# Patient Record
Sex: FEMALE | Race: WHITE | NOT HISPANIC OR LATINO | Employment: FULL TIME | URBAN - METROPOLITAN AREA
[De-identification: names, ages, dates, MRNs, and addresses within clinical notes are randomized per-mention and may not be internally consistent; named-entity substitution may affect disease eponyms.]

---

## 2017-08-19 ENCOUNTER — ALLSCRIPTS OFFICE VISIT (OUTPATIENT)
Dept: OTHER | Facility: OTHER | Age: 52
End: 2017-08-19

## 2017-08-21 ENCOUNTER — GENERIC CONVERSION - ENCOUNTER (OUTPATIENT)
Dept: OTHER | Facility: OTHER | Age: 52
End: 2017-08-21

## 2017-08-24 LAB
A/G RATIO (HISTORICAL): 1.5 (ref 1.2–2.2)
ALBUMIN SERPL BCP-MCNC: 4.1 G/DL (ref 3.5–5.5)
ALP SERPL-CCNC: 58 IU/L (ref 39–117)
ALT SERPL W P-5'-P-CCNC: 12 IU/L (ref 0–32)
ANTINUCLEAR ANTIBODIES, IFA (HISTORICAL): NEGATIVE
AST SERPL W P-5'-P-CCNC: 12 IU/L (ref 0–40)
BACTERIA UR QL AUTO: ABNORMAL
BASOPHILS # BLD AUTO: 0 %
BASOPHILS # BLD AUTO: 0 X10E3/UL (ref 0–0.2)
BILIRUB SERPL-MCNC: 0.2 MG/DL (ref 0–1.2)
BILIRUB UR QL STRIP: NEGATIVE
BUN SERPL-MCNC: 9 MG/DL (ref 6–24)
BUN/CREA RATIO (HISTORICAL): 12 (ref 9–23)
CALCIUM SERPL-MCNC: 9.2 MG/DL (ref 8.7–10.2)
CHLORIDE SERPL-SCNC: 101 MMOL/L (ref 96–106)
CHOLEST SERPL-MCNC: 253 MG/DL (ref 100–199)
CHOLEST/HDLC SERPL: 4.9 RATIO UNITS (ref 0–4.4)
CO2 SERPL-SCNC: 19 MMOL/L (ref 18–29)
COLOR UR: YELLOW
COMMENT (HISTORICAL): ABNORMAL
CREAT SERPL-MCNC: 0.74 MG/DL (ref 0.57–1)
DEPRECATED RDW RBC AUTO: 17 % (ref 12.3–15.4)
EGFR AFRICAN AMERICAN (HISTORICAL): 108 ML/MIN/1.73
EGFR-AMERICAN CALC (HISTORICAL): 94 ML/MIN/1.73
EOSINOPHIL # BLD AUTO: 0.2 X10E3/UL (ref 0–0.4)
EOSINOPHIL # BLD AUTO: 3 %
FECAL OCCULT BLOOD DIAGNOSTIC (HISTORICAL): NEGATIVE
GLUCOSE (HISTORICAL): NEGATIVE
GLUCOSE SERPL-MCNC: 104 MG/DL (ref 65–99)
HBA1C MFR BLD HPLC: 5.7 % (ref 4.8–5.6)
HCT VFR BLD AUTO: 31.3 % (ref 34–46.6)
HDLC SERPL-MCNC: 52 MG/DL
HGB BLD-MCNC: 9.6 G/DL (ref 11.1–15.9)
IMM.GRANULOCYTES (CD4/8) (HISTORICAL): 0 %
IMM.GRANULOCYTES (CD4/8) (HISTORICAL): 0 X10E3/UL (ref 0–0.1)
KETONES UR STRIP-MCNC: NEGATIVE MG/DL
LDL CHOLESTEROL DIRECT (HISTORICAL): 167 MG/DL (ref 0–99)
LDLC SERPL CALC-MCNC: 154 MG/DL (ref 0–99)
LEUKOCYTE ESTERASE UR QL STRIP: NEGATIVE
LYME IGG/IGM AB (HISTORICAL): <0.91 ISR (ref 0–0.9)
LYMPHOCYTES # BLD AUTO: 1.9 X10E3/UL (ref 0.7–3.1)
LYMPHOCYTES # BLD AUTO: 26 %
MCH RBC QN AUTO: 23 PG (ref 26.6–33)
MCHC RBC AUTO-ENTMCNC: 30.7 G/DL (ref 31.5–35.7)
MCV RBC AUTO: 75 FL (ref 79–97)
MICROSCOPIC EXAMINATION (HISTORICAL): ABNORMAL
MICROSCOPIC EXAMINATION (HISTORICAL): ABNORMAL
MONOCYTES # BLD AUTO: 0.6 X10E3/UL (ref 0.1–0.9)
MONOCYTES (HISTORICAL): 8 %
MUCUS THREADS (HISTORICAL): PRESENT
NEUTROPHILS # BLD AUTO: 4.7 X10E3/UL (ref 1.4–7)
NEUTROPHILS # BLD AUTO: 63 %
NITRITE UR QL STRIP: NEGATIVE
NON-SQ EPI CELLS URNS QL MICRO: >10 /HPF
PH UR STRIP.AUTO: 5.5 [PH] (ref 5–7.5)
PLATELET # BLD AUTO: 386 X10E3/UL (ref 150–379)
POTASSIUM SERPL-SCNC: 4.6 MMOL/L (ref 3.5–5.2)
PROT UR STRIP-MCNC: NEGATIVE MG/DL
RBC (HISTORICAL): 4.17 X10E6/UL (ref 3.77–5.28)
RBC (HISTORICAL): ABNORMAL /HPF
SODIUM SERPL-SCNC: 138 MMOL/L (ref 134–144)
SP GR UR STRIP.AUTO: 1.02 (ref 1–1.03)
T4 FREE SERPL-MCNC: 0.94 NG/DL (ref 0.82–1.77)
TOT. GLOBULIN, SERUM (HISTORICAL): 2.8 G/DL (ref 1.5–4.5)
TOTAL PROTEIN (HISTORICAL): 6.9 G/DL (ref 6–8.5)
TRIGL SERPL-MCNC: 236 MG/DL (ref 0–149)
TSH SERPL DL<=0.05 MIU/L-ACNC: 4.75 UIU/ML (ref 0.45–4.5)
URINALYSIS (UA) (HISTORICAL): ABNORMAL
UROBILINOGEN UR QL STRIP.AUTO: 0.2 EU/DL (ref 0.2–1)
VLDLC SERPL CALC-MCNC: 47 MG/DL (ref 5–40)
WBC # BLD AUTO: 7.4 X10E3/UL (ref 3.4–10.8)
WBC # BLD AUTO: ABNORMAL /HPF

## 2017-08-25 ENCOUNTER — GENERIC CONVERSION - ENCOUNTER (OUTPATIENT)
Dept: OTHER | Facility: OTHER | Age: 52
End: 2017-08-25

## 2017-09-19 ENCOUNTER — ALLSCRIPTS OFFICE VISIT (OUTPATIENT)
Dept: OTHER | Facility: OTHER | Age: 52
End: 2017-09-19

## 2017-09-19 DIAGNOSIS — Z12.31 ENCOUNTER FOR SCREENING MAMMOGRAM FOR MALIGNANT NEOPLASM OF BREAST: ICD-10-CM

## 2017-10-24 ENCOUNTER — ALLSCRIPTS OFFICE VISIT (OUTPATIENT)
Dept: OTHER | Facility: OTHER | Age: 52
End: 2017-10-24

## 2017-10-24 DIAGNOSIS — M25.50 PAIN IN JOINT: ICD-10-CM

## 2017-10-24 DIAGNOSIS — M25.561 PAIN IN RIGHT KNEE: ICD-10-CM

## 2017-10-24 DIAGNOSIS — M79.643 PAIN OF HAND: ICD-10-CM

## 2017-10-24 DIAGNOSIS — M25.562 PAIN IN LEFT KNEE: ICD-10-CM

## 2017-10-25 NOTE — PROGRESS NOTES
Assessment  1  Depression with anxiety (300 4) (F41 8)   2  Polyarthralgia (719 49) (M25 50)   3  Knee pain, bilateral (719 46) (M25 561,M25 562)   4  Chronic hand pain, unspecified laterality (369 2,266 93) (M79 643,G89 29)    Plan  Chronic hand pain, unspecified laterality    · * XR HAND 3+ VIEW LEFT; Status:Active; Requested VAA:43VMP7898;    · * XR HAND 3+ VIEW RIGHT; Status:Active; Requested QSQ:19FOA9400;   Chronic hand pain, unspecified laterality, Knee pain, bilateral, Polyarthralgia    · Celecoxib 200 MG Oral Capsule; TAKE 1 CAPSULE DAILY WITH A MEAL   · (1) AMADOU SCREEN W/REFLEX TO TITER/PATTERN; Status:Active; Requested  AEU:[id number];    · (1) LYME ANTIBODY, WESTERN BLOT; Status:Active; Requested BWK:77UAV5257;    · (1) RHEUMATOID FACTOR SCREEN; Status:Active; Requested NWO:36OXZ3459;    · (1) SED RATE; Status:Active; Requested WWX:32QEG0154;    · (1) T4, FREE; Status:Active; Requested GGU:[id number];    · (1) TSH; Status:Active; Requested GTY:56NBV8153;    · (1) VITAMIN D 25-HYDROXY; Status:Active; Requested QUV:47BKD9118;    · (Q) CYCLIC CITRULLINATED PEPTIDE (CCP) AB (IGG); Status:Active; Requested  AMH:24HVF1754;    · Follow-up visit in 3 months Evaluation and Treatment  Follow-up  Status: Complete   Done: 24Oct2017  Iron deficiency anemia    · (1) CBC/ PLT (NO DIFF); Status:Active; Requested MNN:75KVB1887;    · (1) FERRITIN; Status:Active; Requested OZB:55ZFO5244;   Knee pain, bilateral    · * XR KNEE 3 VW LEFT NON INJURY; Status:Active; Requested IBR:[id number];    · * XR KNEE 3 VW RIGHT NON INJURY; Status:Active; Requested VIB:82ABE3982;     Discussion/Summary    1  Depression with anxiety-the patient will continue on the current dose of the low-dose as citalopram  It is helping with her symptoms  Will monitor closely  Polyarthralgia-the patient will go for the lab work to evaluate for possible underlying causes  We will follow up closely with the results  Bilateral knee pain/chronic hand pain bilateral-patient will go for the x-rays as ordered  Will follow up very closely with the results  We will see her back as scheduled  Chief Complaint  Checkup for Depression      History of Present Illness  Lola Brown is a 78-year-old female presents today for follow-up of her depression  At her last visit in September, we titrated her off the Duloxetine in changed her to escitalopram 10 mg daily  She is currently taking the escitalopram 10 mg daily  She feels the medication is not helping her at all  Her body pain returned with stopping the duloxetine  She is craving sugar more and she is not exercising as much  She is not seeing a therapist  She feels that she would not be interested in a nutritionist  She is doing well with the escitalopram for the anxiety and depression  has pain in all of her joints- elbows, shoulder, knees, hips etc  There is no muscle tightness or spasms  She feels pain in all of her bones  There are no fevers or chills  The patient is being seen for a routine clinic follow-up of anxiety disorder  The patient is currently asymptomatic  No associated symptoms are reported  The patient states her depression has improved since the last visit  She describes this as mild  She has had no significant interval events  Interval Symptoms: The patient is currently asymptomatic  denies depression,-- denies depressed mood,-- denies loss of interest or pleasure in activities,-- denies insomnia,-- denies excessive sleepiness,-- denies inability to perform normal activities,-- denies loss of energy,-- denies feelings of worthlessness,-- denies feelings of guilt,-- denies trouble concentrating,-- denies anxiety-- and-- denies sexual dysfunction     Associated symptoms include: no recent weight gain,-- no recent weight loss,-- no thoughts of suicide,-- no social difficulties,-- no delusions,-- no racing thoughts,-- no employment difficulties,-- no auditory hallucinations,-- no periods of excess energy,-- no financial difficulties,-- no visual hallucinations,-- no periods of euphoria-- and-- no difficulty with activities of daily living--   No associated symptoms are reported  Review of Systems    Constitutional: as noted in HPI  Eyes: as noted in HPI    ENT: as noted in HPI  Cardiovascular: as noted in HPI  Respiratory: as noted in HPI  Gastrointestinal: as noted in HPI  Genitourinary: as noted in HPI  Musculoskeletal: as noted in HPI  Integumentary: as noted in HPI  Active Problems  1  Allergic rhinitis (477 9) (J30 9)   2  Alopecia areata (704 01) (L63 9)   3  Colon cancer screening (V76 51) (Z12 11)   4  Constipation (564 00) (K59 00)   5  Depression with anxiety (300 4) (F41 8)   6  Encounter for screening mammogram for breast cancer (V76 12) (Z12 31)   7  Esophagitis, reflux (530 11) (K21 0)   8  Fatigue (780 79) (R53 83)   9  Hyperlipidemia (272 4) (E78 5)   10  Iron deficiency anemia (280 9) (D50 9)   11  Neoplasm of skin (239 2) (D49 2)   12  Polyarthralgia (719 49) (M25 50)   13  Polydipsia (783 5) (R63 1)   14  Polyuria (788 42) (R35 8)    Past Medical History  1  Acute bronchitis (466 0) (J20 9)   2  History of herpes zoster (V12 09) (Z86 19)    Family History  Father    1  Family history of diabetes mellitus (V18 0) (Z83 3)  Sister    2  Family history of diabetes mellitus (V18 0) (Z83 3)   3  Family history of Thyroid Disorder (V18 19)  Brother    4  Family history of autoimmune disorder (V19 8) (Z83 2)   5  Family history of diabetes mellitus (V18 0) (Z83 3)   6  Family history of Thyroid Disorder (V18 19)    The family history was reviewed and updated today  Social History   · Never A Smoker   · No alcohol use   · No illicit drug use  The social history was reviewed and updated today  The social history was reviewed and is unchanged  Current Meds   1  Claritin 10 MG Oral Tablet; TAKE 1 TABLET DAILY; Therapy: 91UVI9489 to (Evaluate:17Mar2018) Recorded   2  Escitalopram Oxalate 10 MG Oral Tablet; Take 1 tablet daily; Therapy: 01Rxc0811 to (Evaluate:18Mar2018)  Requested for: 28Jju6991; Last   Rx:65Kui0207 Ordered   3  Ferrous Sulfate 325 (65 Fe) MG Oral Tablet; TAKE 1 TABLET TWICE DAILY; Therapy: 90Bgi9749 to (Evaluate:22Sdn3365)  Requested for: 40Ynp9597; Last   Rx:06Som7227 Ordered    Allergies  1  No Known Drug Allergies    Vitals  Vital Signs    Recorded: 62DMF5229 06:24PM   Heart Rate 84, R Radial   Pulse Quality Regular, R Radial   Systolic 516, RUE, Sitting   Diastolic 90, RUE, Sitting   Height 5 ft 4 in   Weight 229 lb    BMI Calculated 39 31   BSA Calculated 2 07     Physical Exam    Constitutional   General appearance: No acute distress, well appearing and well nourished  Eyes   Conjunctiva and lids: No swelling, erythema or discharge  Pupils and irises: Equal, round and reactive to light  Ears, Nose, Mouth, and Throat   External inspection of ears and nose: Normal     Otoscopic examination: Tympanic membranes translucent with normal light reflex  Canals patent without erythema  Nasal mucosa, septum, and turbinates: Normal without edema or erythema  Oropharynx: Normal with no erythema, edema, exudate or lesions  Pulmonary   Respiratory effort: No increased work of breathing or signs of respiratory distress  Auscultation of lungs: Clear to auscultation  Cardiovascular   Palpation of heart: Normal PMI, no thrills  Auscultation of heart: Normal rate and rhythm, normal S1 and S2, without murmurs  Examination of extremities for edema and/or varicosities: Normal     Carotid pulses: Normal     Abdomen   Abdomen: Non-tender, no masses  Liver and spleen: No hepatomegaly or splenomegaly  Lymphatic   Palpation of lymph nodes in neck: No lymphadenopathy  Musculoskeletal   Gait and station: Normal     Digits and nails: Normal without clubbing or cyanosis      Inspection/palpation of joints, bones, and muscles: Normal     Skin Skin and subcutaneous tissue: Normal without rashes or lesions  Neurologic   Cranial nerves: Cranial nerves 2-12 intact  Reflexes: 2+ and symmetric  Sensation: No sensory loss      Psychiatric   Orientation to person, place, and time: Normal     Mood and affect: Normal          Signatures   Electronically signed by : Sri De Oliveira DO; Oct 25 2017 12:40AM EST                       (Author)

## 2017-10-31 ENCOUNTER — GENERIC CONVERSION - ENCOUNTER (OUTPATIENT)
Dept: OTHER | Facility: OTHER | Age: 52
End: 2017-10-31

## 2017-11-01 LAB
25(OH)D3 SERPL-MCNC: 20 NG/ML (ref 30–100)
ANTINUCLEAR ANTIBODIES, IFA (HISTORICAL): NEGATIVE
CYCLIC CITRULLINATED PEPTIDE ANTIBODY (HISTORICAL): 8 UNITS (ref 0–19)
DEPRECATED RDW RBC AUTO: 20.6 % (ref 12.3–15.4)
ERYTHROCYTE SEDIMENTATION RATE (HISTORICAL): 60 MM/HR (ref 0–40)
FERRITIN SERPL-MCNC: 17 NG/ML (ref 15–150)
HCT VFR BLD AUTO: 36.7 % (ref 34–46.6)
HGB BLD-MCNC: 12.1 G/DL (ref 11.1–15.9)
LYME IGG/IGM AB (HISTORICAL): <0.91 ISR (ref 0–0.9)
MCH RBC QN AUTO: 27.7 PG (ref 26.6–33)
MCHC RBC AUTO-ENTMCNC: 33 G/DL (ref 31.5–35.7)
MCV RBC AUTO: 84 FL (ref 79–97)
PLATELET # BLD AUTO: 347 X10E3/UL (ref 150–379)
RA LATEX TURBID (HISTORICAL): <10 IU/ML (ref 0–13.9)
RBC (HISTORICAL): 4.37 X10E6/UL (ref 3.77–5.28)
T4 FREE SERPL-MCNC: 0.83 NG/DL (ref 0.82–1.77)
TSH SERPL DL<=0.05 MIU/L-ACNC: 3.06 UIU/ML (ref 0.45–4.5)
WBC # BLD AUTO: 6.9 X10E3/UL (ref 3.4–10.8)

## 2017-11-16 ENCOUNTER — GENERIC CONVERSION - ENCOUNTER (OUTPATIENT)
Dept: OTHER | Facility: OTHER | Age: 52
End: 2017-11-16

## 2018-01-11 NOTE — MISCELLANEOUS
Message  I spoke with the patient reviewed the results of her testing  Her lab work demonstrates that she has subclinical hypothyroidism and no treatment is needed at this time  In addition she has marked iron deficiency anemia  We will start her on the iron supplement is ordered and we are going to refer her to GI for further evaluation and probable colonoscopy and EGD  She will schedule this in the very near future  We will see her back as scheduled  Plan  Iron deficiency anemia    · Ferrous Sulfate 325 (65 Fe) MG Oral Tablet; TAKE 1 TABLET TWICE DAILY   · 3 - [facility]  *  Status: Active  Requested for:  54Okp4656  are Referring to a non- Preferred Provider : Quality  Care Summary provided  : Yes    Results/Data     (1) COMPREHENSIVE METABOLIC PANEL   Alkaline Phosphatase, S: 58 IU/L Reference Range   (LC) Antinuclear Antibodies, IFA   Antinuclear Antibodies, IFA: Negative   (1) COMPREHENSIVE METABOLIC PANEL   Glucose, Serum: 104 mg/dL Abnormal High Reference Range 65-99  BUN: 9 mg/dL Reference Range 6-24  Creatinine, Serum: 0 74 mg/dL Reference Range 0 57-1 00  BUN/Creatinine Ratio: 12  Reference Range 9-23  Sodium, Serum: 138 mmol/L Reference Range 134-144  Potassium, Serum: 4 6 mmol/L Reference Range 3 5-5 2  Chloride, Serum: 101 mmol/L Reference Range   Carbon Dioxide, Total: 19 mmol/L Reference Range 18-29  eGFR If NonAfricn Am: 94 mL/min/1 73 Reference Range >59  eGFR If Africn Am: 108 mL/min/1 73 Reference Range >59  Bilirubin, Total: 0 2 mg/dL Reference Range 0 0-1 2  Calcium, Serum: 9 2 mg/dL Reference Range 8 7-10 2  (1) CBC/PLT/DIFF   WBC: 7 4 x10E3/uL Reference Range 3 4-10 8  RBC: 4 17 x10E6/uL Reference Range 3 77-5 28  Hemoglobin: 9 6 g/dL Abnormal Low Reference Range 11 1-15 9  Hematocrit: 31 3 % Abnormal Low Reference Range 34 0-46  6  MCV: 75 fL Abnormal Low Reference Range 79-97  MCH: 23 0 pg Abnormal Low Reference Range 26 6-33 0  MCHC: 30 7 g/dL Abnormal Low Reference Range 31 5-35 7  RDW: 17 0 % Abnormal High Reference Range 12 3-15 4  Platelets: 800 H65R4/ZH Abnormal High Reference Range 150-379  Neutrophils: 63 %  Lymphs: 26 %  Monocytes: 8 %  Eos: 3 %  Basos: 0 %  Neutrophils (Absolute): 4 7 x10E3/uL Reference Range 1 4-7 0  Lymphs (Absolute): 1 9 x10E3/uL Reference Range 0 7-3 1  Monocytes(Absolute): 0 6 x10E3/uL Reference Range 0 1-0 9  Eos (Absolute): 0 2 x10E3/uL Reference Range 0 0-0 4  Baso (Absolute): 0 0 x10E3/uL Reference Range 0 0-0 2  Immature Granulocytes: 0 %  Immature Grans (Abs): 0 0 x10E3/uL Reference Range 0 0-0 1  (1) LIPID PANEL, FASTING   Cholesterol, Total: 253 mg/dL Abnormal High Reference Range 100-199  Triglycerides: 236 mg/dL Abnormal High Reference Range 0-149  (1) COMPREHENSIVE METABOLIC PANEL   Protein, Total, Serum: 6 9 g/dL Reference Range 6 0-8 5  Albumin, Serum: 4 1 g/dL Reference Range 3 5-5 5  Globulin, Total: 2 8 g/dL Reference Range 1 5-4 5  A/G Ratio: 1 5  Reference Range 1 2-2 2  AST (SGOT): 12 IU/L Reference Range 0-40  (1) LIPID PANEL, FASTING   HDL Cholesterol: 52 mg/dL Reference Range >39  T  Chol/HDL Ratio: 4 9 ratio units Abnormal High Reference Range 0 0-4 4  (1) COMPREHENSIVE METABOLIC PANEL   ALT (SGPT): 12 IU/L Reference Range 0-32  (1) LDL,DIRECT   LDL Chol  (Direct): 167 mg/dL Abnormal High Reference Range 0-99  (1) LIPID PANEL, FASTING   LDL Cholesterol Ca mg/dL Abnormal High Reference Range 0-99  VLDL Cholesterol Colt: 47 mg/dL Abnormal High Reference Range 5-40  (LC) UA/M w/rflx Culture, Comp   RBC: 0-2 /hpf Reference Range 0 -  2  WBC: 0-5 /hpf Reference Range 0 -  5  Bacteria: Few  Reference Range None seen/Few  Epithelial Cells (non renal): >10 /hpf Abnormal Reference Range 0 - 10  Mucus Threads: Present  Reference Range Not Estab    (1) TSH   TSH: 4 750 uIU/mL Abnormal High Reference Range 0 450-4 500  (LC) Thyroxine (T4) Free, Direct, S   T4,Free(Direct): 0 94 ng/dL Reference Range 0  82-1 77  (LC) UA/M w/rflx Culture, Comp   Specific Gravity: 1 021  Reference Range 1 005-1 030  pH: 5 5  Reference Range 5 0-7 5  Urine-Color: Yellow  Reference Range Yellow  Appearance: Cloudy  Abnormal Reference Range Clear  WBC Esterase: Negative  Reference Range Negative  Protein: Negative  Reference Range Negative/Trace  Glucose: Negative  Reference Range Negative  Ketones: Negative  Reference Range Negative  Occult Blood: Negative  Reference Range Negative  Bilirubin: Negative  Reference Range Negative  Urobilinogen,Semi-Qn: 0 2 EU/dL Reference Range 0 2-1 0  Nitrite, Urine: Negative  Reference Range Negative  Microscopic Examination: Comment   Microscopic Examination: See below:   Urinalysis Reflex: Comment   (LC) Lyme, Total Ab Test/Reflex   Lyme IgG/IgM Ab: <0 91 ISR Reference Range 0 00-0 90  (1) HEMOGLOBIN A1C   Hemoglobin A1c: 5 7 % Abnormal High Reference Range 4 8-5 6    Signatures   Electronically signed by : Celena Peck DO; Aug 29 2017  5:55AM EST                       (Author)

## 2018-01-13 VITALS
DIASTOLIC BLOOD PRESSURE: 90 MMHG | WEIGHT: 228 LBS | BODY MASS INDEX: 38.93 KG/M2 | HEIGHT: 64 IN | SYSTOLIC BLOOD PRESSURE: 150 MMHG | HEART RATE: 96 BPM

## 2018-01-13 NOTE — MISCELLANEOUS
Message   Recorded as Task   Date: 09/08/2016 10:08 AM, Created By: Maryjane Caputo   Task Name: Med Renewal Request   Assigned To: León Lindquist   Regarding Patient: Madhavi Caceres, Status: Active   Comment:    Chelsy Tan - 08 Sep 2016 10:08 AM     TASK CREATED  Caller: Self; Renew Medication; [phone] (Home); [phone] (Work)  Refill Pravastatin 40 mg & Ranitidine 150 mg at [facility], [facility]   Edelmira Falk - 08 Sep 2016 1:00 PM     TASK REASSIGNED: Previously Assigned To [employer]      Please advise the patient that I sent in a one-time refill for her but she is due for a checkup or a physical    patient was informed and advised  She said she will call back for an appt  Active Problems    1  Abnormal loss of weight (783 21) (R63 4)   2  Acute bronchitis (466 0) (J20 9)   3  Alopecia areata (704 01) (L63 9)   4  Constipation (564 00) (K59 00)   5  Cough (786 2) (R05)   6  Esophagitis, reflux (530 11) (K21 0)   7  Fatigue (780 79) (R53 83)   8  Glaucoma screening (V80 1) (Z13 5)   9  Hyperlipidemia (272 4) (E78 5)   10  Neck Strain On Right Side (847 0)   11  Neoplasm of skin (239 2) (D49 2)   12  Right Shoulder Strain (840 9)   13  Viral labyrinthitis (386 35) (H83 09)    Current Meds   1  Azithromycin 250 MG Oral Tablet; TAKE 2 TABLETS ON DAY 1 THEN TAKE 1 TABLET A   DAY FOR 4 DAYS; Therapy: 97Ouv6192 to (Last Rx:72Ier0232)  Requested for: 04Ukz8545 Ordered   2  Pravastatin Sodium 40 MG Oral Tablet; Take 1 tablet daily; Therapy: 44BGI4658 to (Evaluate:08Ydx2547)  Requested for: 77Ukt9001; Last   Rx:56Ums1326 Ordered   3  Ranitidine HCl - 150 MG Oral Tablet; TAKE 1 TABLET DAILY; Therapy: 75ZTM9412 to (Last Rx:05Zfr8399)  Requested for: 14Fiv7818 Ordered    Allergies    1   No Known Drug Allergies    Signatures   Electronically signed by : Marisela Mena, ; Sep  8 2016  1:29PM EST                       (Author)

## 2018-01-14 VITALS
DIASTOLIC BLOOD PRESSURE: 72 MMHG | HEIGHT: 64 IN | WEIGHT: 230 LBS | BODY MASS INDEX: 39.27 KG/M2 | HEART RATE: 78 BPM | SYSTOLIC BLOOD PRESSURE: 110 MMHG

## 2018-01-14 VITALS
HEIGHT: 64 IN | SYSTOLIC BLOOD PRESSURE: 140 MMHG | DIASTOLIC BLOOD PRESSURE: 90 MMHG | HEART RATE: 84 BPM | WEIGHT: 229 LBS | BODY MASS INDEX: 39.09 KG/M2

## 2018-01-17 NOTE — MISCELLANEOUS
Message   Recorded as Task   Date: 11/15/2017 12:35 PM, Created By: Tyra Adams   Task Name: Go to Result   Assigned To: Tyra Adams   Regarding Patient: Dorothea Barrientos, Status: In Progress   Comment:    Edelmira Falk - 15 Nov 2017 12:35 PM     TASK CREATED  Please let the patient know that her lab work looked good  Her hemoglobin has improved significantly and she should continue on iron supplement  It is now in the normal range  The only thing that was abnormal was her vitamin-D level which was low  She is vitamin-D deficient   In thatcould cause some of her aches and pains  I would like to start her on a high dose once weekly vitamin D 43790 units for the next 8 weeks  At the end of 8 weeks we can give her a paper to repeat her vitamin-D level  Please check with her to see what  pharmacy I should send the vitamin-D in 2 for her and I will send in  She can report back in 3 weeks as to how she is feeling with this  Shaista Jang - 15 Nov 2017 5:43 PM     TASK IN PROGRESS   Shaista Jang - 16 Nov 2017 9:15 AM     TASK EDITED  Left message on home/cell number to call office back  Shaista Jang - 82 Nov 2017 3:52 PM     TASK EDITED  patienti nformed and advised  lab results in mail for patient     Shaista Jang - 74 Nov 2017 3:52 PM     TASK REASSIGNED: Previously Assigned To Marisela Chavez  [facility]        Plan  Vitamin D deficiency    · Vitamin D (Ergocalciferol) 53625 UNIT Oral Capsule; take 1 capsule once weekly  for 8 weeks    Signatures   Electronically signed by : Liv Dela Cruz DO; Nov 16 2017  8:26PM EST                       (Author)

## 2018-02-19 RX ORDER — ESCITALOPRAM OXALATE 10 MG/1
TABLET ORAL
Qty: 30 TABLET | Refills: 2 | OUTPATIENT
Start: 2018-02-19

## 2018-02-28 DIAGNOSIS — F41.8 DEPRESSION WITH ANXIETY: Primary | ICD-10-CM

## 2018-02-28 RX ORDER — ESCITALOPRAM OXALATE 10 MG/1
1 TABLET ORAL DAILY
COMMUNITY
Start: 2017-09-19 | End: 2018-02-28 | Stop reason: SDUPTHER

## 2018-02-28 RX ORDER — ESCITALOPRAM OXALATE 10 MG/1
10 TABLET ORAL DAILY
Qty: 90 TABLET | Refills: 1 | Status: SHIPPED | OUTPATIENT
Start: 2018-02-28 | End: 2018-07-31 | Stop reason: SDUPTHER

## 2018-03-23 ENCOUNTER — APPOINTMENT (EMERGENCY)
Dept: RADIOLOGY | Facility: HOSPITAL | Age: 53
End: 2018-03-23
Payer: COMMERCIAL

## 2018-03-23 ENCOUNTER — HOSPITAL ENCOUNTER (EMERGENCY)
Facility: HOSPITAL | Age: 53
Discharge: HOME | End: 2018-03-23
Payer: COMMERCIAL

## 2018-03-23 VITALS
TEMPERATURE: 98.5 F | HEIGHT: 64 IN | DIASTOLIC BLOOD PRESSURE: 83 MMHG | SYSTOLIC BLOOD PRESSURE: 163 MMHG | BODY MASS INDEX: 40.12 KG/M2 | WEIGHT: 235 LBS | OXYGEN SATURATION: 98 % | RESPIRATION RATE: 20 BRPM | HEART RATE: 102 BPM

## 2018-03-23 DIAGNOSIS — S16.1XXA STRAIN OF NECK MUSCLE, INITIAL ENCOUNTER: Primary | ICD-10-CM
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
## 2018-03-23 PROCEDURE — 72040 X-RAY EXAM NECK SPINE 2-3 VW: CPT

## 2018-03-23 PROCEDURE — 99284 EMERGENCY DEPT VISIT MOD MDM: CPT

## 2018-03-23 RX ORDER — FERROUS SULFATE 325(65) MG
1 TABLET ORAL
COMMUNITY
Start: 2017-08-25 | End: 2019-09-25 | Stop reason: ALTCHOICE

## 2018-03-23 RX ORDER — ACETAMINOPHEN 325 MG/1
975 TABLET ORAL ONCE
Status: COMPLETED | OUTPATIENT
Start: 2018-03-23 | End: 2018-03-23

## 2018-03-23 RX ORDER — LORATADINE 10 MG/1
1 TABLET ORAL DAILY
COMMUNITY
Start: 2017-08-19 | End: 2019-09-25 | Stop reason: ALTCHOICE

## 2018-03-23 RX ORDER — CYCLOBENZAPRINE HCL 5 MG
5 TABLET ORAL 3 TIMES DAILY PRN
Qty: 20 TABLET | Refills: 0 | Status: SHIPPED | OUTPATIENT
Start: 2018-03-23 | End: 2019-09-25 | Stop reason: ALTCHOICE

## 2018-03-23 RX ORDER — CELECOXIB 200 MG/1
1 CAPSULE ORAL DAILY
COMMUNITY
Start: 2017-10-24 | End: 2018-08-10 | Stop reason: SDUPTHER

## 2018-03-23 RX ADMIN — ACETAMINOPHEN 975 MG: 325 TABLET ORAL at 15:57
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
## 2018-03-29 ENCOUNTER — OFFICE VISIT (OUTPATIENT)
Dept: FAMILY MEDICINE CLINIC | Facility: CLINIC | Age: 53
End: 2018-03-29
Payer: COMMERCIAL

## 2018-03-29 VITALS
HEIGHT: 64 IN | SYSTOLIC BLOOD PRESSURE: 126 MMHG | DIASTOLIC BLOOD PRESSURE: 76 MMHG | HEART RATE: 80 BPM | WEIGHT: 235 LBS | BODY MASS INDEX: 40.12 KG/M2

## 2018-03-29 DIAGNOSIS — M62.830 MUSCLE SPASM OF BACK: Primary | ICD-10-CM

## 2018-03-29 PROBLEM — M25.562 KNEE PAIN, BILATERAL: Status: ACTIVE | Noted: 2017-10-24

## 2018-03-29 PROBLEM — M79.643 CHRONIC HAND PAIN: Status: ACTIVE | Noted: 2017-10-24

## 2018-03-29 PROBLEM — R63.1 EXCESSIVE THIRST: Status: ACTIVE | Noted: 2017-08-19

## 2018-03-29 PROBLEM — D50.9 IRON DEFICIENCY ANEMIA: Status: ACTIVE | Noted: 2017-08-25

## 2018-03-29 PROBLEM — M25.50 POLYARTHRALGIA: Status: ACTIVE | Noted: 2017-08-19

## 2018-03-29 PROBLEM — F41.8 DEPRESSION WITH ANXIETY: Status: ACTIVE | Noted: 2017-08-19

## 2018-03-29 PROBLEM — G89.29 CHRONIC HAND PAIN: Status: ACTIVE | Noted: 2017-10-24

## 2018-03-29 PROBLEM — M25.561 KNEE PAIN, BILATERAL: Status: ACTIVE | Noted: 2017-10-24

## 2018-03-29 PROBLEM — E55.9 VITAMIN D DEFICIENCY: Status: ACTIVE | Noted: 2017-11-16

## 2018-03-29 PROCEDURE — 99213 OFFICE O/P EST LOW 20 MIN: CPT | Performed by: NURSE PRACTITIONER

## 2018-03-29 RX ORDER — METHOCARBAMOL 500 MG/1
500 TABLET, FILM COATED ORAL 3 TIMES DAILY
Qty: 30 TABLET | Refills: 0 | Status: SHIPPED | OUTPATIENT
Start: 2018-03-29 | End: 2019-09-25 | Stop reason: ALTCHOICE

## 2018-03-29 NOTE — PATIENT INSTRUCTIONS
Muscle Spasm   WHAT YOU NEED TO KNOW:   A muscle spasm is a sudden contraction of any muscle or group of muscles  A muscle cramp is a painful muscle spasm  Muscle cramps commonly occur after intense exercise or during pregnancy  They may also be caused by certain medications, dehydration, low calcium or magnesium levels, or another medical condition  DISCHARGE INSTRUCTIONS:   Medicines: You may need the following:  · NSAIDs  help decrease swelling and pain or fever  This medicine is available with or without a doctor's order  NSAIDs can cause stomach bleeding or kidney problems in certain people  If you take blood thinner medicine, always ask your healthcare provider if NSAIDs are safe for you  Always read the medicine label and follow directions  · Take your medicine as directed  Contact your healthcare provider if you think your medicine is not helping or if you have side effects  Tell him of her if you are allergic to any medicine  Keep a list of the medicines, vitamins, and herbs you take  Include the amounts, and when and why you take them  Bring the list or the pill bottles to follow-up visits  Carry your medicine list with you in case of an emergency  Follow up with your healthcare provider as directed: You may need other tests or treatment  You may also be referred to a physical therapist or other specialist  Write down your questions so you remember to ask them during your visits  Self-care:   · Stretch  your muscle to help relieve the cramp  It may be helpful to keep your muscle in the stretched position until the cramp is gone  · Apply heat  to help decrease pain and muscle spasms  Apply heat on the area for 20 to 30 minutes every 2 hours for as many days as directed  · Apply ice  to help decrease swelling and pain  Ice may also help prevent tissue damage  Use an ice pack, or put crushed ice in a plastic bag   Cover it with a towel and place it on your muscle for 15 to 20 minutes every hour or as directed  · Drink more liquids  to help prevent muscle cramps caused by dehydration  Sports drinks may help replace electrolytes you lose through sweat during exercise  Ask your healthcare provider how much liquid to drink each day and which liquids are best for you  · Eat healthy foods , such as fruits, vegetables, whole grains, low-fat dairy products, and lean proteins (meat, beans, and fish)  If you are pregnant, ask your healthcare provider about foods that are high in magnesium and sodium  They may help to relieve cramps during pregnancy  · Massage your muscle  to help relieve the cramp  · Take frequent deep breaths  until the cramp feels better  Lie down while you take the deep breaths so you do not get dizzy or lightheaded  Contact your healthcare provider if:   · You have signs of dehydration, such as a headache, dark yellow urine, dry eyes or mouth, or a fast heartbeat  · You have questions or concerns about your condition or care  Return to the emergency department if:   · You have warmth, swelling, or redness in the cramping muscle  · You have frequent or unrelieved muscle cramps in several different muscles  · You have muscle cramps with numbness, tingling, and burning in your hands and feet  © 2017 [address] Information is for End User's use only and may not be sold, redistributed or otherwise used for commercial purposes  All illustrations and images included in CareNotes® are the copyrighted property of [employer] , [employer]  or [employer]  The above information is an  only  It is not intended as medical advice for individual conditions or treatments  Talk to your doctor, nurse or pharmacist before following any medical regimen to see if it is safe and effective for you
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
## 2018-06-04 RX ORDER — CELECOXIB 200 MG/1
CAPSULE ORAL
Qty: 30 CAPSULE | Refills: 6 | OUTPATIENT
Start: 2018-06-04

## 2018-07-31 DIAGNOSIS — F41.8 DEPRESSION WITH ANXIETY: ICD-10-CM

## 2018-07-31 RX ORDER — ESCITALOPRAM OXALATE 10 MG/1
10 TABLET ORAL DAILY
Qty: 90 TABLET | Refills: 1 | Status: SHIPPED | OUTPATIENT
Start: 2018-07-31 | End: 2019-03-21 | Stop reason: SDUPTHER

## 2018-08-10 DIAGNOSIS — M25.50 POLYARTHRALGIA: ICD-10-CM

## 2018-08-10 DIAGNOSIS — F41.8 DEPRESSION WITH ANXIETY: Primary | ICD-10-CM

## 2018-08-10 PROBLEM — J30.9 ALLERGIC RHINITIS: Status: ACTIVE | Noted: 2017-08-19

## 2018-08-10 RX ORDER — CELECOXIB 200 MG/1
200 CAPSULE ORAL DAILY
Qty: 90 CAPSULE | Refills: 1 | Status: SHIPPED | OUTPATIENT
Start: 2018-08-10 | End: 2019-02-21 | Stop reason: SDUPTHER

## 2019-01-16 ENCOUNTER — OFFICE VISIT (OUTPATIENT)
Dept: FAMILY MEDICINE CLINIC | Facility: CLINIC | Age: 54
End: 2019-01-16
Payer: COMMERCIAL

## 2019-01-16 VITALS
SYSTOLIC BLOOD PRESSURE: 126 MMHG | HEART RATE: 89 BPM | OXYGEN SATURATION: 98 % | WEIGHT: 244.2 LBS | DIASTOLIC BLOOD PRESSURE: 76 MMHG | HEIGHT: 64 IN | TEMPERATURE: 99 F | BODY MASS INDEX: 41.69 KG/M2

## 2019-01-16 DIAGNOSIS — Z86.2 HISTORY OF ANEMIA: ICD-10-CM

## 2019-01-16 DIAGNOSIS — R07.89 CHEST TIGHTNESS: ICD-10-CM

## 2019-01-16 DIAGNOSIS — E55.9 VITAMIN D DEFICIENCY: ICD-10-CM

## 2019-01-16 DIAGNOSIS — Z13.6 SCREENING FOR CARDIOVASCULAR CONDITION: ICD-10-CM

## 2019-01-16 DIAGNOSIS — R53.83 OTHER FATIGUE: Primary | ICD-10-CM

## 2019-01-16 PROCEDURE — 99214 OFFICE O/P EST MOD 30 MIN: CPT | Performed by: NURSE PRACTITIONER

## 2019-01-16 PROCEDURE — 93000 ELECTROCARDIOGRAM COMPLETE: CPT | Performed by: NURSE PRACTITIONER

## 2019-01-16 PROCEDURE — 1036F TOBACCO NON-USER: CPT | Performed by: NURSE PRACTITIONER

## 2019-01-16 PROCEDURE — 3008F BODY MASS INDEX DOCD: CPT | Performed by: NURSE PRACTITIONER

## 2019-01-16 NOTE — PATIENT INSTRUCTIONS
1  Obtain labs and I'll call with results  2  Cardiac CT at your request   3   Consider healthy changes, gradual walking, lean meats and veggies

## 2019-01-17 LAB
CHOLEST SERPL-MCNC: 271 MG/DL (ref 100–199)
EST. AVERAGE GLUCOSE BLD GHB EST-MCNC: 123 MG/DL
HBA1C MFR BLD: 5.9 % (ref 4.8–5.6)
HDLC SERPL-MCNC: 45 MG/DL
LDLC SERPL CALC-MCNC: 171 MG/DL (ref 0–99)
LDLC/HDLC SERPL: 3.8 RATIO (ref 0–3.2)
SL AMB VLDL CHOLESTEROL CALC: 55 MG/DL (ref 5–40)
TRIGL SERPL-MCNC: 277 MG/DL (ref 0–149)

## 2019-01-17 PROCEDURE — 3044F HG A1C LEVEL LT 7.0%: CPT | Performed by: NURSE PRACTITIONER

## 2019-01-17 NOTE — PROGRESS NOTES
Assessment/Plan   Diagnoses and all orders for this visit:    Other fatigue  -     CBC and differential; Future  -     Comprehensive metabolic panel; Future  -     TSH, 3rd generation with Free T4 reflex; Future  -     Vitamin B12; Future  -     Lyme Antibody Profile with reflex to WB; Future  -     CBC and differential  -     Comprehensive metabolic panel  -     TSH, 3rd generation with Free T4 reflex  -     Vitamin B12  -     Lyme Antibody Profile with reflex to WB  -     HEMOGLOBIN A1C W/ EAG ESTIMATION; Future  -     HEMOGLOBIN A1C W/ EAG ESTIMATION    Vitamin D deficiency  -     Vitamin D 25 hydroxy; Future  -     Vitamin D 25 hydroxy    History of anemia  -     CBC and differential; Future  -     CBC and differential    Chest tightness  Comments:  POCT EKG NSR, without etopics  Request coronary CT calcium score  Will refer to Cardiology if symptoms persist post lab work  Orders:  -     POCT ECG  -     CT coronary calcium score; Future    Screening for cardiovascular condition  -     Lipid Panel with Direct LDL reflex; Future  -     Lipid Panel with Direct LDL reflex    BMI 40 0-44 9, adult (HCC)  Comments:  BMI 41 92, lengthly discussion included for life style modification-healthy eating choices, walking and gradually adding time         Chief Complaint   Patient presents with    Fatigue     chest tightness, weakness, muscle spasms, foggy memory, excessive tiredness, headaches, easily SOB, hot flashes, ear pressure and dizziness for two months       Subjective   Patient ID: David Pablo is a 47 y o  female  Vitals:    01/16/19 1758   BP: 126/76   Pulse: 89   Temp: 99 °F (37 2 °C)   SpO2: 98%     Reports no desire or energy to do anything, discussed possibility that she may need an increase in anxiety/depression medication  States she doesn't think it is that  Patient's weight is up with a BMI of 41 92, discussed changes in diet and exercise and how small amount of weight loss could make a difference  Admits to "sugar Addict", discussed changes in diet to eliminate sugar  Fatigue   This is a recurrent problem  The current episode started more than 1 month ago  The problem occurs constantly  The problem has been unchanged  Associated symptoms include chest pain (chest tightness ), fatigue and myalgias  Pertinent negatives include no abdominal pain, anorexia, arthralgias, change in bowel habit, chills, congestion, coughing, diaphoresis, fever, headaches, joint swelling, nausea, neck pain, numbness, rash, sore throat, swollen glands, urinary symptoms, vertigo, visual change, vomiting or weakness  Associated symptoms comments: Muscle twitching, no desire to get up and do anything  Nothing aggravates the symptoms  She has tried nothing for the symptoms  The treatment provided no relief  The following portions of the patient's history were reviewed and updated as appropriate: allergies, current medications, past family history, past social history, past surgical history and problem list     Review of Systems   Constitutional: Positive for fatigue  Negative for chills, diaphoresis and fever  HENT: Negative  Negative for congestion and sore throat  Eyes: Negative  Respiratory: Negative  Negative for cough  Cardiovascular: Positive for chest pain (chest tightness )  Negative for palpitations and leg swelling  Gastrointestinal: Negative for abdominal pain, anorexia, change in bowel habit, nausea and vomiting  Endocrine: Negative  Genitourinary: Negative  Musculoskeletal: Positive for myalgias  Negative for arthralgias, joint swelling and neck pain  Skin: Negative  Negative for rash  Allergic/Immunologic: Negative  Neurological: Negative  Negative for vertigo, weakness, numbness and headaches  Hematological: Negative  Psychiatric/Behavioral: Positive for decreased concentration  Negative for sleep disturbance and suicidal ideas  The patient is nervous/anxious  Objective     Physical Exam   Constitutional: She is oriented to person, place, and time  She appears well-developed and well-nourished  No distress  HENT:   Head: Normocephalic and atraumatic  Eyes: Conjunctivae are normal  Right eye exhibits no discharge  Left eye exhibits no discharge  Neck: Normal range of motion  Neck supple  Cardiovascular: Normal rate, regular rhythm, normal heart sounds and intact distal pulses  No murmur heard  Pulmonary/Chest: Effort normal and breath sounds normal  No respiratory distress  She has no wheezes  She has no rales  Abdominal: Soft  Bowel sounds are normal    Musculoskeletal: Normal range of motion  She exhibits no edema or tenderness  Neurological: She is alert and oriented to person, place, and time  Skin: Skin is warm and dry  Capillary refill takes less than 2 seconds  No rash noted  She is not diaphoretic  No erythema  Psychiatric: She has a normal mood and affect  Her behavior is normal  Judgment and thought content normal    Nursing note and vitals reviewed    No Known Allergies  Patient Active Problem List   Diagnosis    Alopecia areata    Chronic hand pain    Constipation    Depression with anxiety    Esophagitis, reflux    Fatigue    Hyperlipidemia    Iron deficiency anemia    Knee pain, bilateral    Polyarthralgia    Excessive thirst    Vitamin D deficiency    Allergic rhinitis    Neoplasm of skin       Current Outpatient Prescriptions:     celecoxib (CeleBREX) 200 mg capsule, Take 1 capsule (200 mg total) by mouth daily, Disp: 90 capsule, Rfl: 1    cyclobenzaprine (FLEXERIL) 5 mg tablet, Take 1 tablet (5 mg total) by mouth 3 (three) times a day as needed for muscle spasms for up to 7 days, Disp: 20 tablet, Rfl: 0    escitalopram (LEXAPRO) 10 mg tablet, Take 1 tablet (10 mg total) by mouth daily, Disp: 90 tablet, Rfl: 1    loratadine (CLARITIN) 10 mg tablet, Take 1 tablet by mouth daily, Disp: , Rfl:     methocarbamol (ROBAXIN) 500 mg tablet, Take 1 tablet (500 mg total) by mouth 3 (three) times a day, Disp: 30 tablet, Rfl: 0    ferrous sulfate 325 (65 Fe) mg tablet, Take 1 tablet by mouth daily with breakfast  , Disp: , Rfl:   Social History     Social History    Marital status: Single     Spouse name: N/A    Number of children: N/A    Years of education: N/A     Occupational History    Not on file       Social History Main Topics    Smoking status: Never Smoker    Smokeless tobacco: Never Used    Alcohol use No    Drug use: No    Sexual activity: Not on file     Other Topics Concern    Not on file     Social History Narrative    No narrative on file     Family History   Problem Relation Age of Onset    Diabetes Father     Diabetes Sister     Thyroid disease Sister         Disorder    Autoimmune disease Brother         Disorder    Diabetes Brother     Thyroid disease Brother

## 2019-01-18 ENCOUNTER — HOSPITAL ENCOUNTER (OUTPATIENT)
Dept: CT IMAGING | Facility: HOSPITAL | Age: 54
Discharge: HOME | End: 2019-01-18
Payer: COMMERCIAL

## 2019-01-18 DIAGNOSIS — R07.89 CHEST TIGHTNESS: ICD-10-CM

## 2019-01-19 LAB
25(OH)D3+25(OH)D2 SERPL-MCNC: 22 NG/ML (ref 30–100)
ALBUMIN SERPL-MCNC: 4.4 G/DL (ref 3.5–5.5)
ALBUMIN/GLOB SERPL: 1.5 {RATIO} (ref 1.2–2.2)
ALP SERPL-CCNC: 59 IU/L (ref 39–117)
ALT SERPL-CCNC: 20 IU/L (ref 0–32)
AST SERPL-CCNC: 21 IU/L (ref 0–40)
B BURGDOR IGG+IGM SER-ACNC: <0.91 ISR (ref 0–0.9)
BASOPHILS # BLD AUTO: 0 X10E3/UL (ref 0–0.2)
BASOPHILS NFR BLD AUTO: 0 %
BILIRUB SERPL-MCNC: 0.3 MG/DL (ref 0–1.2)
BUN SERPL-MCNC: 12 MG/DL (ref 6–24)
BUN/CREAT SERPL: 16 (ref 9–23)
CALCIUM SERPL-MCNC: 9.9 MG/DL (ref 8.7–10.2)
CHLORIDE SERPL-SCNC: 98 MMOL/L (ref 96–106)
CO2 SERPL-SCNC: 22 MMOL/L (ref 20–29)
CREAT SERPL-MCNC: 0.73 MG/DL (ref 0.57–1)
EOSINOPHIL # BLD AUTO: 0.2 X10E3/UL (ref 0–0.4)
EOSINOPHIL NFR BLD AUTO: 2 %
ERYTHROCYTE [DISTWIDTH] IN BLOOD BY AUTOMATED COUNT: 14.9 % (ref 12.3–15.4)
GLOBULIN SER-MCNC: 3 G/DL (ref 1.5–4.5)
GLUCOSE SERPL-MCNC: 114 MG/DL (ref 65–99)
HCT VFR BLD AUTO: 37.3 % (ref 34–46.6)
HGB BLD-MCNC: 11.9 G/DL (ref 11.1–15.9)
IMM GRANULOCYTES # BLD: 0 X10E3/UL (ref 0–0.1)
IMM GRANULOCYTES NFR BLD: 0 %
LYMPHOCYTES # BLD AUTO: 2 X10E3/UL (ref 0.7–3.1)
LYMPHOCYTES NFR BLD AUTO: 26 %
MCH RBC QN AUTO: 27.5 PG (ref 26.6–33)
MCHC RBC AUTO-ENTMCNC: 31.9 G/DL (ref 31.5–35.7)
MCV RBC AUTO: 86 FL (ref 79–97)
MONOCYTES # BLD AUTO: 0.5 X10E3/UL (ref 0.1–0.9)
MONOCYTES NFR BLD AUTO: 7 %
NEUTROPHILS # BLD AUTO: 5.1 X10E3/UL (ref 1.4–7)
NEUTROPHILS NFR BLD AUTO: 65 %
PLATELET # BLD AUTO: 352 X10E3/UL (ref 150–379)
POTASSIUM SERPL-SCNC: 4.7 MMOL/L (ref 3.5–5.2)
PROT SERPL-MCNC: 7.4 G/DL (ref 6–8.5)
RBC # BLD AUTO: 4.33 X10E6/UL (ref 3.77–5.28)
SL AMB EGFR AFRICAN AMERICAN: 110 ML/MIN/1.73
SL AMB EGFR NON AFRICAN AMERICAN: 95 ML/MIN/1.73
SL AMB T4, FREE (DIRECT): 0.94 NG/DL (ref 0.82–1.77)
SODIUM SERPL-SCNC: 137 MMOL/L (ref 134–144)
TSH SERPL DL<=0.005 MIU/L-ACNC: 6.16 UIU/ML (ref 0.45–4.5)
VIT B12 SERPL-MCNC: 674 PG/ML (ref 232–1245)
WBC # BLD AUTO: 7.9 X10E3/UL (ref 3.4–10.8)

## 2019-01-23 DIAGNOSIS — E03.9 ACQUIRED HYPOTHYROIDISM: Primary | ICD-10-CM

## 2019-01-23 DIAGNOSIS — E55.9 VITAMIN D DEFICIENCY: ICD-10-CM

## 2019-01-23 RX ORDER — ERGOCALCIFEROL 1.25 MG/1
50000 CAPSULE ORAL WEEKLY
Qty: 8 CAPSULE | Refills: 0 | Status: SHIPPED | OUTPATIENT
Start: 2019-01-23 | End: 2019-09-25 | Stop reason: ALTCHOICE

## 2019-01-23 RX ORDER — LEVOTHYROXINE SODIUM 0.05 MG/1
50 TABLET ORAL DAILY
Qty: 30 TABLET | Refills: 1 | Status: SHIPPED | OUTPATIENT
Start: 2019-01-23 | End: 2019-03-21 | Stop reason: SDUPTHER

## 2019-01-23 NOTE — PROGRESS NOTES
Decreased Vit D, increased TSH, beginning supplementation, in 4 weeks will re-evaluate level  Of TSH and then begin treating elevated lipids with atorvastatin

## 2019-02-11 DIAGNOSIS — M25.50 POLYARTHRALGIA: ICD-10-CM

## 2019-02-11 RX ORDER — CELECOXIB 200 MG/1
CAPSULE ORAL
Qty: 90 CAPSULE | Refills: 1 | OUTPATIENT
Start: 2019-02-11

## 2019-02-21 DIAGNOSIS — M25.50 POLYARTHRALGIA: ICD-10-CM

## 2019-02-21 RX ORDER — CELECOXIB 200 MG/1
200 CAPSULE ORAL DAILY
Qty: 90 CAPSULE | Refills: 1 | Status: SHIPPED | OUTPATIENT
Start: 2019-02-21 | End: 2019-09-25 | Stop reason: SDUPTHER

## 2019-03-12 LAB
25(OH)D3+25(OH)D2 SERPL-MCNC: 25.2 NG/ML (ref 30–100)
TSH SERPL DL<=0.005 MIU/L-ACNC: 2.07 UIU/ML (ref 0.45–4.5)

## 2019-03-13 ENCOUNTER — TELEPHONE (OUTPATIENT)
Dept: FAMILY MEDICINE CLINIC | Facility: CLINIC | Age: 54
End: 2019-03-13

## 2019-03-14 ENCOUNTER — TELEPHONE (OUTPATIENT)
Dept: FAMILY MEDICINE CLINIC | Facility: CLINIC | Age: 54
End: 2019-03-14

## 2019-03-14 NOTE — TELEPHONE ENCOUNTER
Called patient to informed of test results and she is asking for more information on her vit D      PLEASE ADVICE, THANKS EO

## 2019-03-20 DIAGNOSIS — E55.9 VITAMIN D DEFICIENCY: ICD-10-CM

## 2019-03-20 RX ORDER — ERGOCALCIFEROL 1.25 MG/1
CAPSULE ORAL
Qty: 8 CAPSULE | Refills: 0 | OUTPATIENT
Start: 2019-03-20

## 2019-03-21 DIAGNOSIS — E55.9 VITAMIN D DEFICIENCY: ICD-10-CM

## 2019-03-21 DIAGNOSIS — F41.8 DEPRESSION WITH ANXIETY: ICD-10-CM

## 2019-03-21 RX ORDER — ESCITALOPRAM OXALATE 10 MG/1
10 TABLET ORAL DAILY
Qty: 90 TABLET | Refills: 1 | Status: SHIPPED | OUTPATIENT
Start: 2019-03-21 | End: 2019-09-25 | Stop reason: ALTCHOICE

## 2019-03-21 RX ORDER — LEVOTHYROXINE SODIUM 0.05 MG/1
50 TABLET ORAL DAILY
Qty: 90 TABLET | Refills: 1 | Status: SHIPPED | OUTPATIENT
Start: 2019-03-21 | End: 2019-09-25 | Stop reason: ALTCHOICE

## 2019-09-11 DIAGNOSIS — E55.9 VITAMIN D DEFICIENCY: ICD-10-CM

## 2019-09-11 DIAGNOSIS — F41.8 DEPRESSION WITH ANXIETY: ICD-10-CM

## 2019-09-11 RX ORDER — ESCITALOPRAM OXALATE 10 MG/1
TABLET ORAL
Qty: 90 TABLET | Refills: 1 | OUTPATIENT
Start: 2019-09-11

## 2019-09-11 RX ORDER — LEVOTHYROXINE SODIUM 0.05 MG/1
TABLET ORAL
Qty: 90 TABLET | Refills: 1 | OUTPATIENT
Start: 2019-09-11

## 2019-09-13 DIAGNOSIS — M25.50 POLYARTHRALGIA: ICD-10-CM

## 2019-09-13 RX ORDER — CELECOXIB 200 MG/1
CAPSULE ORAL
Qty: 90 CAPSULE | Refills: 1 | OUTPATIENT
Start: 2019-09-13

## 2019-09-25 ENCOUNTER — OFFICE VISIT (OUTPATIENT)
Dept: FAMILY MEDICINE CLINIC | Facility: CLINIC | Age: 54
End: 2019-09-25
Payer: COMMERCIAL

## 2019-09-25 VITALS
OXYGEN SATURATION: 98 % | BODY MASS INDEX: 42.51 KG/M2 | HEIGHT: 64 IN | TEMPERATURE: 99.1 F | SYSTOLIC BLOOD PRESSURE: 118 MMHG | DIASTOLIC BLOOD PRESSURE: 80 MMHG | WEIGHT: 249 LBS | HEART RATE: 97 BPM

## 2019-09-25 DIAGNOSIS — E03.9 ACQUIRED HYPOTHYROIDISM: ICD-10-CM

## 2019-09-25 DIAGNOSIS — E66.01 MORBID OBESITY WITH BMI OF 40.0-44.9, ADULT (HCC): ICD-10-CM

## 2019-09-25 DIAGNOSIS — Z23 IMMUNIZATION DUE: ICD-10-CM

## 2019-09-25 DIAGNOSIS — Z12.39 SCREENING FOR BREAST CANCER: ICD-10-CM

## 2019-09-25 DIAGNOSIS — M25.50 POLYARTHRALGIA: Primary | ICD-10-CM

## 2019-09-25 DIAGNOSIS — S16.1XXA STRAIN OF NECK MUSCLE, INITIAL ENCOUNTER: ICD-10-CM

## 2019-09-25 DIAGNOSIS — E78.2 MIXED HYPERLIPIDEMIA: ICD-10-CM

## 2019-09-25 DIAGNOSIS — R73.01 IMPAIRED FASTING GLUCOSE: ICD-10-CM

## 2019-09-25 PROCEDURE — 99213 OFFICE O/P EST LOW 20 MIN: CPT | Performed by: NURSE PRACTITIONER

## 2019-09-25 PROCEDURE — 90472 IMMUNIZATION ADMIN EACH ADD: CPT | Performed by: NURSE PRACTITIONER

## 2019-09-25 PROCEDURE — 90682 RIV4 VACC RECOMBINANT DNA IM: CPT | Performed by: NURSE PRACTITIONER

## 2019-09-25 PROCEDURE — 3008F BODY MASS INDEX DOCD: CPT | Performed by: NURSE PRACTITIONER

## 2019-09-25 PROCEDURE — 90715 TDAP VACCINE 7 YRS/> IM: CPT | Performed by: NURSE PRACTITIONER

## 2019-09-25 PROCEDURE — 90471 IMMUNIZATION ADMIN: CPT | Performed by: NURSE PRACTITIONER

## 2019-09-25 RX ORDER — CELECOXIB 200 MG/1
200 CAPSULE ORAL DAILY
Qty: 90 CAPSULE | Refills: 1 | Status: SHIPPED | OUTPATIENT
Start: 2019-09-25 | End: 2020-03-19 | Stop reason: SDUPTHER

## 2019-09-25 NOTE — PROGRESS NOTES
Assessment/Plan   Diagnoses and all orders for this visit:    Polyarthralgia  -     celecoxib (CeleBREX) 200 mg capsule; Take 1 capsule (200 mg total) by mouth daily    Screening for breast cancer  -     Mammo screening bilateral w 3d & cad; Future    Immunization due  -     Discontinue: tetanus-diphtheria-acellular pertussis (ADACEL) 5-2-15 5 LF-mcg/0 5 injection; Inject 0 5 mL into a muscle once for 1 dose  -     influenza vaccine, [id number], quadrivalent, recombinant, PF, 0 5 mL, for patients 18 yr+ (FLUBLOK)  -     TDAP VACCINE GREATER THAN OR EQUAL TO 8YO IM    Impaired fasting glucose  -     HEMOGLOBIN A1C W/ EAG ESTIMATION; Future  -     Comprehensive metabolic panel; Future  -     HEMOGLOBIN A1C W/ EAG ESTIMATION  -     Comprehensive metabolic panel    Mixed hyperlipidemia  -     Lipid Panel with Direct LDL reflex; Future  -     Comprehensive metabolic panel; Future  -     Lipid Panel with Direct LDL reflex  -     Comprehensive metabolic panel    Acquired hypothyroidism  -     TSH, 3rd generation with Free T4 reflex; Future  -     TSH, 3rd generation with Free T4 reflex    Strain of neck muscle, initial encounter    Morbid obesity with BMI of 40 0-44 9, adult [facility])        Chief Complaint   Patient presents with    Follow-up     needs vaccines before granddaughter is born        Subjective   Patient ID: Thalia Blake is a 49 y o  female  Vitals:    09/25/19 1825   BP: 118/80   Pulse: 97   Temp: 99 1 °F (37 3 °C)   SpO2: 98%     Here today for a med check up and to have immunizations for influenza and Adacel for new grandbaby in household  She reports that she has stopped all her medications with exception of celebrex, she has felt tired and doesn't believe they were helping her  History of hypertension, hyperlipidemia, and hypothyroid  Will recheck appropriate labs and begin medication therapy as needed  Immunizations given tonight      No other issues or complaints at this time      The following portions of the patient's history were reviewed and updated as appropriate: allergies, current medications, past family history, past medical history, past social history and past surgical history  Review of Systems   Constitutional: Negative  HENT: Negative  Eyes: Negative  Respiratory: Negative  Cardiovascular: Negative  Gastrointestinal: Negative  Endocrine: Negative  Genitourinary: Negative  Musculoskeletal: Positive for arthralgias  Skin: Negative  Allergic/Immunologic: Negative  Neurological: Negative  Hematological: Negative  Psychiatric/Behavioral: Negative  Objective     Physical Exam   Constitutional: She is oriented to person, place, and time  She appears well-developed and well-nourished  No distress  HENT:   Head: Normocephalic  Right Ear: External ear normal    Left Ear: External ear normal    Nose: Nose normal    Mouth/Throat: Oropharynx is clear and moist  No oropharyngeal exudate  Eyes: Conjunctivae are normal  Right eye exhibits no discharge  Left eye exhibits no discharge  Neck: Normal range of motion  Neck supple  No thyromegaly present  Cardiovascular: Normal rate, regular rhythm, normal heart sounds and intact distal pulses  No murmur heard  Pulmonary/Chest: Effort normal and breath sounds normal    Abdominal: Soft  Musculoskeletal: Normal range of motion  She exhibits no edema or tenderness  Lymphadenopathy:     She has no cervical adenopathy  Neurological: She is alert and oriented to person, place, and time  Skin: Skin is warm and dry  Capillary refill takes less than 2 seconds  She is not diaphoretic  Psychiatric: She has a normal mood and affect  Her behavior is normal  Judgment and thought content normal    Nursing note and vitals reviewed    No Known Allergies  Patient Active Problem List   Diagnosis    Alopecia areata    Chronic hand pain    Constipation    Depression with anxiety    Esophagitis, reflux  Fatigue    Hyperlipidemia    Iron deficiency anemia    Knee pain, bilateral    Polyarthralgia    Excessive thirst    Vitamin D deficiency    Allergic rhinitis    Neoplasm of skin       Current Outpatient Medications:     celecoxib (CeleBREX) 200 mg capsule, Take 1 capsule (200 mg total) by mouth daily, Disp: 90 capsule, Rfl: 1  Social History     Socioeconomic History    Marital status: Single     Spouse name: Not on file    Number of children: Not on file    Years of education: Not on file    Highest education level: Not on file   Occupational History    Not on file   Social Needs    Financial resource strain: Not on file    Food insecurity:     Worry: Not on file     Inability: Not on file    Transportation needs:     Medical: Not on file     Non-medical: Not on file   Tobacco Use    Smoking status: Never Smoker    Smokeless tobacco: Never Used   Substance and Sexual Activity    Alcohol use: No    Drug use: No    Sexual activity: Not on file   Lifestyle    Physical activity:     Days per week: Not on file     Minutes per session: Not on file    Stress: Not on file   Relationships    Social connections:     Talks on phone: Not on file     Gets together: Not on file     Attends Restorationist service: Not on file     Active member of club or organization: Not on file     Attends meetings of clubs or organizations: Not on file     Relationship status: Not on file    Intimate partner violence:     Fear of current or ex partner: Not on file     Emotionally abused: Not on file     Physically abused: Not on file     Forced sexual activity: Not on file   Other Topics Concern    Not on file   Social History Narrative    Not on file     Family History   Problem Relation Age of Onset    Diabetes Father     Diabetes Sister     Thyroid disease Sister         Disorder    Autoimmune disease Brother         Disorder    Diabetes Brother     Thyroid disease Brother        BMI Counseling:  Body mass index is 42 74 kg/m²  The BMI is above normal  Nutrition recommendations include reducing portion sizes, decreasing overall calorie intake, 3-5 servings of fruits/vegetables daily, reducing fast food intake, consuming healthier snacks, decreasing soda and/or juice intake, moderation in carbohydrate intake, increasing intake of lean protein, reducing intake of saturated fat and trans fat and reducing intake of cholesterol  Exercise recommendations include moderate aerobic physical activity for 150 minutes/week

## 2019-09-30 ENCOUNTER — TELEPHONE (OUTPATIENT)
Dept: FAMILY MEDICINE CLINIC | Facility: CLINIC | Age: 54
End: 2019-09-30

## 2019-09-30 NOTE — TELEPHONE ENCOUNTER
It may be bauman for her to be seen first to do a full breast exam and make sure we are ordering the right thing and confirming which breast et

## 2019-09-30 NOTE — TELEPHONE ENCOUNTER
Dallin Aragon found a lump in her breast so her mammogram order needs to be changed to a diagnostic and has to be done a the [address], could someone please change that for her so she can schedule that       [phone]  Any questions

## 2019-10-01 LAB
ALBUMIN SERPL-MCNC: 4.3 G/DL (ref 3.5–5.5)
ALBUMIN/GLOB SERPL: 1.5 {RATIO} (ref 1.2–2.2)
ALP SERPL-CCNC: 62 IU/L (ref 39–117)
ALT SERPL-CCNC: 20 IU/L (ref 0–32)
AST SERPL-CCNC: 21 IU/L (ref 0–40)
BILIRUB SERPL-MCNC: 0.2 MG/DL (ref 0–1.2)
BUN SERPL-MCNC: 15 MG/DL (ref 6–24)
BUN/CREAT SERPL: 21 (ref 9–23)
CALCIUM SERPL-MCNC: 9.8 MG/DL (ref 8.7–10.2)
CHLORIDE SERPL-SCNC: 99 MMOL/L (ref 96–106)
CHOLEST SERPL-MCNC: 245 MG/DL (ref 100–199)
CO2 SERPL-SCNC: 20 MMOL/L (ref 20–29)
CREAT SERPL-MCNC: 0.71 MG/DL (ref 0.57–1)
EST. AVERAGE GLUCOSE BLD GHB EST-MCNC: 128 MG/DL
GLOBULIN SER-MCNC: 2.8 G/DL (ref 1.5–4.5)
GLUCOSE SERPL-MCNC: 106 MG/DL (ref 65–99)
HBA1C MFR BLD: 6.1 % (ref 4.8–5.6)
HDLC SERPL-MCNC: 49 MG/DL
LDLC SERPL CALC-MCNC: 137 MG/DL (ref 0–99)
LDLC/HDLC SERPL: 2.8 RATIO (ref 0–3.2)
POTASSIUM SERPL-SCNC: 4.6 MMOL/L (ref 3.5–5.2)
PROT SERPL-MCNC: 7.1 G/DL (ref 6–8.5)
SL AMB EGFR AFRICAN AMERICAN: 112 ML/MIN/1.73
SL AMB EGFR NON AFRICAN AMERICAN: 98 ML/MIN/1.73
SL AMB T4, FREE (DIRECT): 0.89 NG/DL (ref 0.82–1.77)
SL AMB VLDL CHOLESTEROL CALC: 59 MG/DL (ref 5–40)
SODIUM SERPL-SCNC: 135 MMOL/L (ref 134–144)
TRIGL SERPL-MCNC: 297 MG/DL (ref 0–149)
TSH SERPL DL<=0.005 MIU/L-ACNC: 4.6 UIU/ML (ref 0.45–4.5)

## 2019-10-03 DIAGNOSIS — E03.9 ACQUIRED HYPOTHYROIDISM: Primary | ICD-10-CM

## 2019-10-03 RX ORDER — LEVOTHYROXINE SODIUM 0.05 MG/1
50 TABLET ORAL DAILY
Qty: 90 TABLET | Refills: 0 | Status: SHIPPED | OUTPATIENT
Start: 2019-10-03 | End: 2020-03-19 | Stop reason: SDUPTHER

## 2019-10-03 NOTE — PROGRESS NOTES
Restarting levothyroxine (patinet had stopped on own)  Will retest in 6 weeks, she will schedule follow up appointment at that time  Discussed cholesterol  Reports that she fell off the wagon with healthy eating, would like a chance to begin life style modifications again

## 2019-10-21 ENCOUNTER — HOSPITAL ENCOUNTER (OUTPATIENT)
Dept: MAMMOGRAPHY | Facility: CLINIC | Age: 54
Discharge: HOME | End: 2019-10-21
Payer: COMMERCIAL

## 2019-10-21 VITALS — HEIGHT: 64 IN | WEIGHT: 249 LBS | BODY MASS INDEX: 42.51 KG/M2

## 2019-10-21 DIAGNOSIS — Z12.39 SCREENING FOR BREAST CANCER: ICD-10-CM

## 2019-10-21 PROCEDURE — 77067 SCR MAMMO BI INCL CAD: CPT

## 2019-10-21 PROCEDURE — 77063 BREAST TOMOSYNTHESIS BI: CPT

## 2019-10-29 ENCOUNTER — HOSPITAL ENCOUNTER (OUTPATIENT)
Dept: MAMMOGRAPHY | Facility: CLINIC | Age: 54
Discharge: HOME | End: 2019-10-29
Payer: COMMERCIAL

## 2019-10-29 ENCOUNTER — HOSPITAL ENCOUNTER (OUTPATIENT)
Dept: ULTRASOUND IMAGING | Facility: CLINIC | Age: 54
Discharge: HOME | End: 2019-10-29
Payer: COMMERCIAL

## 2019-10-29 VITALS — HEIGHT: 64 IN | BODY MASS INDEX: 42.51 KG/M2 | WEIGHT: 249 LBS

## 2019-10-29 DIAGNOSIS — R92.8 ABNORMAL SCREENING MAMMOGRAM: ICD-10-CM

## 2019-10-29 PROCEDURE — 77066 DX MAMMO INCL CAD BI: CPT

## 2019-10-29 PROCEDURE — 76642 ULTRASOUND BREAST LIMITED: CPT

## 2019-10-29 PROCEDURE — G0279 TOMOSYNTHESIS, MAMMO: HCPCS

## 2019-10-29 RX ORDER — LORATADINE 10 MG/1
10 TABLET ORAL DAILY
COMMUNITY

## 2019-10-29 NOTE — PROGRESS NOTES
Met with patient and Dr Looney Proffer regarding recommendation for;      _____ RIGHT ___x___LEFT      __x___Ultrasound guided  ______Stereotactic  Breast biopsy  __x___Verbalized understanding        Blood thinners:  _____yes __x___no    Date stopped: ____n/a_______    Biopsy teaching sheet given:  ___x____yes ______no

## 2019-11-07 ENCOUNTER — HOSPITAL ENCOUNTER (OUTPATIENT)
Dept: ULTRASOUND IMAGING | Facility: CLINIC | Age: 54
Discharge: HOME | End: 2019-11-07
Payer: COMMERCIAL

## 2019-11-07 VITALS
HEIGHT: 64 IN | HEART RATE: 76 BPM | WEIGHT: 249 LBS | SYSTOLIC BLOOD PRESSURE: 128 MMHG | DIASTOLIC BLOOD PRESSURE: 64 MMHG | BODY MASS INDEX: 42.51 KG/M2

## 2019-11-07 DIAGNOSIS — R92.8 ABNORMAL ULTRASOUND OF BREAST: ICD-10-CM

## 2019-11-07 PROCEDURE — 19083 BX BREAST 1ST LESION US IMAG: CPT

## 2019-11-07 PROCEDURE — 88342 IMHCHEM/IMCYTCHM 1ST ANTB: CPT | Performed by: PATHOLOGY

## 2019-11-07 PROCEDURE — 88305 TISSUE EXAM BY PATHOLOGIST: CPT | Performed by: PATHOLOGY

## 2019-11-07 PROCEDURE — 88341 IMHCHEM/IMCYTCHM EA ADD ANTB: CPT | Performed by: PATHOLOGY

## 2019-11-07 RX ORDER — LIDOCAINE HYDROCHLORIDE 10 MG/ML
4 INJECTION, SOLUTION INFILTRATION; PERINEURAL ONCE
Status: COMPLETED | OUTPATIENT
Start: 2019-11-07 | End: 2019-11-07

## 2019-11-07 RX ADMIN — LIDOCAINE HYDROCHLORIDE 4 ML: 10 INJECTION, SOLUTION INFILTRATION; PERINEURAL at 11:13

## 2019-11-07 NOTE — PROGRESS NOTES
Procedure type:    __x___ultrasound guided _____stereotactic    Breast:    ___x__Left _____Right    Location: 10:00 11cm from nipple    Needle: 12g Brandee    # of passes: 2    Clip: Rojas    Performed by: Dr Andrea Douglass held for 5 minutes by: James Cantrell Strips:    __x___yes _____no    Band aid:    __x___yes_____no    Tape and guaze:    _____yes __x___no    Tolerated procedure:    __x___yes _____no

## 2019-11-08 NOTE — PROGRESS NOTES
Post procedure call completed on 11/8/19 @ 8197      Bleeding: _____yes __x___no    Pain: _____yes ___x___no    Redness/Swelling: ______yes ___x___no    Band aid removed: __x___yes _____no    Steri-Strips intact: __x____yes _____no

## 2019-11-11 ENCOUNTER — TELEPHONE (OUTPATIENT)
Dept: MAMMOGRAPHY | Facility: CLINIC | Age: 54
End: 2019-11-11

## 2019-11-20 ENCOUNTER — OFFICE VISIT (OUTPATIENT)
Dept: FAMILY MEDICINE CLINIC | Facility: CLINIC | Age: 54
End: 2019-11-20
Payer: COMMERCIAL

## 2019-11-20 VITALS
OXYGEN SATURATION: 96 % | SYSTOLIC BLOOD PRESSURE: 122 MMHG | BODY MASS INDEX: 40.97 KG/M2 | HEIGHT: 64 IN | DIASTOLIC BLOOD PRESSURE: 78 MMHG | HEART RATE: 86 BPM | TEMPERATURE: 99.2 F | WEIGHT: 240 LBS

## 2019-11-20 DIAGNOSIS — M54.50 ACUTE LEFT-SIDED LOW BACK PAIN WITHOUT SCIATICA: Primary | ICD-10-CM

## 2019-11-20 DIAGNOSIS — Z11.4 SCREENING FOR HIV (HUMAN IMMUNODEFICIENCY VIRUS): ICD-10-CM

## 2019-11-20 DIAGNOSIS — E03.9 ACQUIRED HYPOTHYROIDISM: ICD-10-CM

## 2019-11-20 LAB
SL AMB  POCT GLUCOSE, UA: NORMAL
SL AMB LEUKOCYTE ESTERASE,UA: NORMAL
SL AMB POCT BILIRUBIN,UA: NORMAL
SL AMB POCT BLOOD,UA: NORMAL
SL AMB POCT CLARITY,UA: CLEAR
SL AMB POCT COLOR,UA: YELLOW
SL AMB POCT KETONES,UA: NORMAL
SL AMB POCT NITRITE,UA: NORMAL
SL AMB POCT PH,UA: 6
SL AMB POCT SPECIFIC GRAVITY,UA: 1
SL AMB POCT URINE PROTEIN: NORMAL
SL AMB POCT UROBILINOGEN: NORMAL

## 2019-11-20 PROCEDURE — 1036F TOBACCO NON-USER: CPT | Performed by: NURSE PRACTITIONER

## 2019-11-20 PROCEDURE — 99213 OFFICE O/P EST LOW 20 MIN: CPT | Performed by: NURSE PRACTITIONER

## 2019-11-20 PROCEDURE — 81002 URINALYSIS NONAUTO W/O SCOPE: CPT | Performed by: NURSE PRACTITIONER

## 2019-11-20 NOTE — PATIENT INSTRUCTIONS
Lower Back Exercises   WHAT YOU NEED TO KNOW:   Lower back exercises help heal and strengthen your back muscles to prevent another injury  Ask your healthcare provider if you need to see a physical therapist for more advanced exercises  DISCHARGE INSTRUCTIONS:   Return to the emergency department if:   · You have severe pain that prevents you from moving  Contact your healthcare provider if:   · Your pain becomes worse  · You have new pain  · You have questions or concerns about your condition or care  Do lower back exercises safely:   · Do the exercises on a mat or firm surface  (not on a bed) to support your spine and prevent low back pain  · Move slowly and smoothly  Avoid fast or jerky motions  · Breathe normally  Do not hold your breath  · Stop if you feel pain  It is normal to feel some discomfort at first  Regular exercise will help decrease your discomfort over time  Lower back exercises: Your healthcare provider may recommend that you do back exercises 10 to 30 minutes each day  He may also recommend that you do exercises 1 to 3 times each day  Ask your healthcare provider which exercises are best for you and how often to do them  · Ankle pumps:  Lie on your back  Move your foot up (with your toes pointing toward your head)  Then, move your foot down (with your toes pointing away from you)  Repeat this exercise 10 times on each side  · Heel slides:  Lie on your back  Slowly bend one leg and then straighten it  Next, bend the other leg and then straighten it  Repeat 10 times on each side  · Pelvic tilt:  Lie on your back with your knees bent and feet flat on the floor  Place your arms in a relaxed position beside your body  Tighten the muscles of your abdomen and flatten your back against the floor  Hold for 5 seconds  Repeat 5 times  · Back stretch:  Lie on your back with your hands behind your head   Bend your knees and turn the lower half of your body to one side  Hold this position for 10 seconds  Repeat 3 times on each side  · Straight leg raises:  Lie on your back with one leg straight  Bend the other knee  Tighten your abdomen and then slowly lift the straight leg up about 6 to 12 inches off the floor  Hold for 1 to 5 seconds  Lower your leg slowly  Repeat 10 times on each leg  · Knee-to-chest:  Lie on your back with your knees bent and feet flat on the floor  Pull one of your knees toward your chest and hold it there for 5 seconds  Return your leg to the starting position  Lift the other knee toward your chest and hold for 5 seconds  Do this 5 times on each side  · Cat and camel:  Place your hands and knees on the floor  Arch your back upward toward the ceiling and lower your head  Round out your spine as much as you can  Hold for 5 seconds  Lift your head upward and push your chest downward toward the floor  Hold for 5 seconds  Do 3 sets or as directed  · Wall squats:  Stand with your back against a wall  Tighten the muscles of your abdomen  Slowly lower your body until your knees are bent at a 45 degree angle  Hold this position for 5 seconds  Slowly move back up to a standing position  Repeat 10 times  · Curl up:  Lie on your back with your knees bent and feet flat on the floor  Place your hands, palms down, underneath the curve in your lower back  Next, with your elbows on the floor, lift your shoulders and chest 2 to 3 inches  Keep your head in line with your shoulders  Hold this position for 5 seconds  When you can do this exercise without pain for 10 to 15 seconds, you may add a rotation  While your shoulders and chest are lifted off the ground, turn slightly to the left and hold  Repeat on the other side  · Bird dog:  Place your hands and knees on the floor  Keep your wrists directly below your shoulders and your knees directly below your hips   Pull your belly button in toward your spine  Do not flatten or arch your back  Tighten your abdominal muscles  Raise one arm straight out so that it is aligned with your head  Next, raise the leg opposite your arm  Hold this position for 15 seconds  Lower your arm and leg slowly and change sides  Do 5 sets  © 2017 [employer] Information is for End User's use only and may not be sold, redistributed or otherwise used for commercial purposes  All illustrations and images included in CareNotes® are the copyrighted property of [employer]  or [employer]  The above information is an  only  It is not intended as medical advice for individual conditions or treatments  Talk to your doctor, nurse or pharmacist before following any medical regimen to see if it is safe and effective for you

## 2019-11-20 NOTE — PROGRESS NOTES
Assessment/Plan   Problem List Items Addressed This Visit     None      Visit Diagnoses     Acute left-sided low back pain without sciatica    -  Primary    Relevant Orders    POCT urine dip (Completed)    Acquired hypothyroidism        Relevant Orders    TSH, 3rd generation with Free T4 reflex    Screening for HIV (human immunodeficiency virus)        Relevant Orders    Human Immunodeficiency Virus 1/2 Antigen / Antibody ( Fourth Generation) with Reflex Testing                Chief Complaint   Patient presents with    Follow-up     f/u for medications     Back Pain     LB left sided pain for one month  pain 10/10       Subjective   Patient ID: Roberto Hdz is a 49 y o  female  Vitals:    11/20/19 1034   BP: 122/78   Pulse: 86   Temp: 99 2 °F (37 3 °C)   SpO2: 96%     Reports has been back on thyroid medications, will recheck TSH level  Back Pain   This is a recurrent problem  The current episode started 1 to 4 weeks ago (1 month ago began with left lower bck pan, she does have a history of kidney stones and feels that his could be a stone)  The problem occurs intermittently (occurs with specific movement)  The problem has been waxing and waning since onset  The pain is present in the lumbar spine  The quality of the pain is described as shooting  The pain does not radiate  The pain is at a severity of 7/10  The pain is moderate  The pain is worse during the day  The symptoms are aggravated by bending and twisting  Stiffness is present in the morning  Pertinent negatives include no abdominal pain, bladder incontinence, bowel incontinence, chest pain, dysuria, headaches, numbness, paresthesias, pelvic pain or perianal numbness  Risk factors include lack of exercise, obesity and sedentary lifestyle  Treatments tried: on celebrex currently  The treatment provided no relief         The following portions of the patient's history were reviewed and updated as appropriate: allergies, current medications, past family history, past social history, past surgical history and problem list     Review of Systems   Constitutional: Negative  HENT: Negative  Eyes: Negative  Respiratory: Negative  Cardiovascular: Negative  Negative for chest pain  Gastrointestinal: Negative  Negative for abdominal pain and bowel incontinence  Endocrine: Negative  Genitourinary: Negative  Negative for bladder incontinence, dysuria and pelvic pain  Musculoskeletal: Positive for back pain  Skin: Negative  Allergic/Immunologic: Negative  Neurological: Negative  Negative for numbness, headaches and paresthesias  Hematological: Negative  Psychiatric/Behavioral: Negative  Objective     Physical Exam   Constitutional: She is oriented to person, place, and time  She appears well-developed and well-nourished  No distress  HENT:   Head: Normocephalic and atraumatic  Eyes: Conjunctivae are normal  Right eye exhibits no discharge  Left eye exhibits no discharge  Neck: Normal range of motion  Neck supple  Cardiovascular: Normal rate and regular rhythm  Pulmonary/Chest: Effort normal and breath sounds normal    Abdominal: Soft  Bowel sounds are normal  There is no rebound and no CVA tenderness  Musculoskeletal: Normal range of motion  She exhibits tenderness  She exhibits no edema  Right shoulder: She exhibits tenderness and pain  She exhibits normal range of motion, no swelling, no effusion, no crepitus and normal strength  Lumbar back: She exhibits tenderness, pain and spasm  She exhibits normal range of motion, no edema and no deformity  Back:    Neurological: She is alert and oriented to person, place, and time  Skin: Skin is warm and dry  Capillary refill takes less than 2 seconds  She is not diaphoretic  Psychiatric: She has a normal mood and affect  Her behavior is normal  Judgment and thought content normal    Nursing note and vitals reviewed     No Known Allergies  No Known Allergies  Patient Active Problem List   Diagnosis    Alopecia areata    Chronic hand pain    Constipation    Depression with anxiety    Esophagitis, reflux    Fatigue    Hyperlipidemia    Iron deficiency anemia    Knee pain, bilateral    Polyarthralgia    Excessive thirst    Vitamin D deficiency    Allergic rhinitis    Neoplasm of skin       Current Outpatient Medications:     celecoxib (CeleBREX) 200 mg capsule, Take 1 capsule (200 mg total) by mouth daily, Disp: 90 capsule, Rfl: 1    Cholecalciferol (VITAMIN D PO), Take by mouth, Disp: , Rfl:     levothyroxine 50 mcg tablet, Take 1 tablet (50 mcg total) by mouth daily, Disp: 90 tablet, Rfl: 0    loratadine (CLARITIN) 10 mg tablet, Take 10 mg by mouth daily, Disp: , Rfl:     Multiple Vitamins-Minerals (MULTIVITAMIN PO), Take by mouth, Disp: , Rfl:   Social History     Socioeconomic History    Marital status: Single     Spouse name: Not on file    Number of children: Not on file    Years of education: Not on file    Highest education level: Not on file   Occupational History    Not on file   Social Needs    Financial resource strain: Not on file    Food insecurity:     Worry: Not on file     Inability: Not on file    Transportation needs:     Medical: Not on file     Non-medical: Not on file   Tobacco Use    Smoking status: Never Smoker    Smokeless tobacco: Never Used   Substance and Sexual Activity    Alcohol use: No    Drug use: No    Sexual activity: Not on file   Lifestyle    Physical activity:     Days per week: Not on file     Minutes per session: Not on file    Stress: Not on file   Relationships    Social connections:     Talks on phone: Not on file     Gets together: Not on file     Attends Orthodoxy service: Not on file     Active member of club or organization: Not on file     Attends meetings of clubs or organizations: Not on file     Relationship status: Not on file    Intimate partner violence:     Fear of current or ex partner: Not on file     Emotionally abused: Not on file     Physically abused: Not on file     Forced sexual activity: Not on file   Other Topics Concern    Not on file   Social History Narrative    Not on file     Family History   Problem Relation Age of Onset    Diabetes Father     Diabetes Sister     Thyroid disease Sister         Disorder    Autoimmune disease Brother         Disorder    Diabetes Brother     Thyroid disease Brother     Breast cancer Maternal Aunt

## 2019-12-06 ENCOUNTER — TELEPHONE (OUTPATIENT)
Dept: FAMILY MEDICINE CLINIC | Facility: CLINIC | Age: 54
End: 2019-12-06

## 2019-12-06 NOTE — TELEPHONE ENCOUNTER
Left message on machine to please call [employer], [phone] and give them the information that they are awaiting for so they can send out the kit  Any questions call office    trb

## 2019-12-09 ENCOUNTER — TELEPHONE (OUTPATIENT)
Dept: FAMILY MEDICINE CLINIC | Facility: CLINIC | Age: 54
End: 2019-12-09

## 2019-12-17 ENCOUNTER — OFFICE VISIT (OUTPATIENT)
Dept: FAMILY MEDICINE CLINIC | Facility: CLINIC | Age: 54
End: 2019-12-17
Payer: COMMERCIAL

## 2019-12-17 VITALS
WEIGHT: 238.8 LBS | HEIGHT: 64 IN | DIASTOLIC BLOOD PRESSURE: 84 MMHG | SYSTOLIC BLOOD PRESSURE: 120 MMHG | BODY MASS INDEX: 40.77 KG/M2 | HEART RATE: 94 BPM | RESPIRATION RATE: 16 BRPM | OXYGEN SATURATION: 99 % | TEMPERATURE: 99.4 F

## 2019-12-17 DIAGNOSIS — R19.5 ABNORMAL STOOLS: ICD-10-CM

## 2019-12-17 DIAGNOSIS — R21 RASH: Primary | ICD-10-CM

## 2019-12-17 DIAGNOSIS — Z86.39 H/O IRON DEFICIENCY: ICD-10-CM

## 2019-12-17 PROCEDURE — 99213 OFFICE O/P EST LOW 20 MIN: CPT | Performed by: NURSE PRACTITIONER

## 2019-12-17 PROCEDURE — 1036F TOBACCO NON-USER: CPT | Performed by: NURSE PRACTITIONER

## 2019-12-17 PROCEDURE — 3008F BODY MASS INDEX DOCD: CPT | Performed by: NURSE PRACTITIONER

## 2019-12-17 RX ORDER — METHYLPREDNISOLONE 4 MG/1
TABLET ORAL
Qty: 21 EACH | Refills: 0 | Status: SHIPPED | OUTPATIENT
Start: 2019-12-17 | End: 2021-03-17 | Stop reason: ALTCHOICE

## 2019-12-17 NOTE — PROGRESS NOTES
Assessment/Plan   Problem List Items Addressed This Visit     None      Visit Diagnoses     Rash    -  Primary    Relevant Medications    methylPREDNISolone 4 MG tablet therapy pack    H/O iron deficiency        Relevant Orders    CBC and differential    Ferritin    TIBC    Iron    Abnormal stools        Relevant Orders    Comprehensive metabolic panel    Hepatic function panel                Chief Complaint   Patient presents with    Stool     yellow stools for 2 months     Urticaria     all over for 4 days       Subjective   Patient ID: Lesly Florez is a 49 y o  female  Vitals:    12/17/19 1603   BP: 120/84   Pulse: 94   Resp: 16   Temp: 99 4 °F (37 4 °C)   SpO2: 99%     Here today to discuss yellow stools, usually 2 "soft loose stools" that have been occurring over past 2 months", Denies abdominal pain, history of cholecystectomy (27 years ago)  Admit to darker urine but "I 'm not drinking as much as I usually do"   Only new medication otc, cinnamon for blood sugar but then stopped  The following portions of the patient's history were reviewed and updated as appropriate: allergies, past family history, past medical history, past social history, past surgical history and problem list     Review of Systems   Constitutional: Negative  HENT: Negative  Eyes: Negative  Respiratory: Negative  Negative for cough and chest tightness  Cardiovascular: Negative  Negative for chest pain  Gastrointestinal: Negative  Diarrhea: "loose stools" yellow in color  Endocrine: Negative  Genitourinary: Negative  Musculoskeletal: Negative  Skin: Negative  Allergic/Immunologic: Negative  Neurological: Negative  Hematological: Negative  Psychiatric/Behavioral: Negative  Objective     Physical Exam   Constitutional: She is oriented to person, place, and time  She appears well-developed and well-nourished  No distress  HENT:   Head: Normocephalic and atraumatic     Right Ear: External ear normal    Left Ear: External ear normal    Nose: Nose normal    Mouth/Throat: Oropharynx is clear and moist  No oropharyngeal exudate  Eyes: Conjunctivae are normal  Right eye exhibits no discharge  Left eye exhibits no discharge  Neck: Normal range of motion  Neck supple  No thyromegaly present  Cardiovascular: Normal rate, regular rhythm, normal heart sounds and intact distal pulses  No murmur heard  Pulmonary/Chest: Effort normal and breath sounds normal  No respiratory distress  Abdominal: Soft  Bowel sounds are normal  She exhibits no distension and no mass  There is no tenderness  There is no rebound and no guarding  No hernia  Musculoskeletal: Normal range of motion  She exhibits no edema or tenderness  Lymphadenopathy:     She has no cervical adenopathy  Neurological: She is alert and oriented to person, place, and time  Skin: Skin is warm and dry  Capillary refill takes less than 2 seconds  She is not diaphoretic  No erythema  Psychiatric: She has a normal mood and affect  Her behavior is normal  Judgment and thought content normal    Nursing note and vitals reviewed    No Known Allergies

## 2019-12-21 LAB
ALBUMIN SERPL-MCNC: 4.4 G/DL (ref 3.5–5.5)
ALBUMIN/GLOB SERPL: 1.5 {RATIO} (ref 1.2–2.2)
ALP SERPL-CCNC: 59 IU/L (ref 39–117)
ALT SERPL-CCNC: 13 IU/L (ref 0–32)
AST SERPL-CCNC: 13 IU/L (ref 0–40)
BASOPHILS # BLD AUTO: 0 X10E3/UL (ref 0–0.2)
BASOPHILS NFR BLD AUTO: 0 %
BILIRUB DIRECT SERPL-MCNC: 0.06 MG/DL (ref 0–0.4)
BILIRUB SERPL-MCNC: <0.2 MG/DL (ref 0–1.2)
BUN SERPL-MCNC: 19 MG/DL (ref 6–24)
BUN/CREAT SERPL: 24 (ref 9–23)
CALCIUM SERPL-MCNC: 9.9 MG/DL (ref 8.7–10.2)
CHLORIDE SERPL-SCNC: 101 MMOL/L (ref 96–106)
CO2 SERPL-SCNC: 21 MMOL/L (ref 20–29)
CREAT SERPL-MCNC: 0.78 MG/DL (ref 0.57–1)
EOSINOPHIL # BLD AUTO: 0 X10E3/UL (ref 0–0.4)
EOSINOPHIL NFR BLD AUTO: 0 %
ERYTHROCYTE [DISTWIDTH] IN BLOOD BY AUTOMATED COUNT: 16.1 % (ref 12.3–15.4)
FERRITIN SERPL-MCNC: 8 NG/ML (ref 15–150)
GLOBULIN SER-MCNC: 3 G/DL (ref 1.5–4.5)
GLUCOSE SERPL-MCNC: 120 MG/DL (ref 65–99)
HCT VFR BLD AUTO: 36.7 % (ref 34–46.6)
HGB BLD-MCNC: 11.9 G/DL (ref 11.1–15.9)
HIV 1+2 AB+HIV1 P24 AG SERPL QL IA: NON REACTIVE
IMM GRANULOCYTES # BLD: 0.1 X10E3/UL (ref 0–0.1)
IMM GRANULOCYTES NFR BLD: 1 %
IRON SATN MFR SERPL: 7 % (ref 15–55)
IRON SERPL-MCNC: 31 UG/DL (ref 27–159)
LYMPHOCYTES # BLD AUTO: 1.5 X10E3/UL (ref 0.7–3.1)
LYMPHOCYTES NFR BLD AUTO: 16 %
MCH RBC QN AUTO: 26.5 PG (ref 26.6–33)
MCHC RBC AUTO-ENTMCNC: 32.4 G/DL (ref 31.5–35.7)
MCV RBC AUTO: 82 FL (ref 79–97)
MONOCYTES # BLD AUTO: 0.3 X10E3/UL (ref 0.1–0.9)
MONOCYTES NFR BLD AUTO: 4 %
NEUTROPHILS # BLD AUTO: 7 X10E3/UL (ref 1.4–7)
NEUTROPHILS NFR BLD AUTO: 79 %
PLATELET # BLD AUTO: 427 X10E3/UL (ref 150–450)
POTASSIUM SERPL-SCNC: 4.9 MMOL/L (ref 3.5–5.2)
PROT SERPL-MCNC: 7.4 G/DL (ref 6–8.5)
RBC # BLD AUTO: 4.49 X10E6/UL (ref 3.77–5.28)
SL AMB EGFR AFRICAN AMERICAN: 100 ML/MIN/1.73
SL AMB EGFR NON AFRICAN AMERICAN: 87 ML/MIN/1.73
SODIUM SERPL-SCNC: 137 MMOL/L (ref 134–144)
TIBC SERPL-MCNC: 445 UG/DL (ref 250–450)
TSH SERPL DL<=0.005 MIU/L-ACNC: 1.09 UIU/ML (ref 0.45–4.5)
UIBC SERPL-MCNC: 414 UG/DL (ref 131–425)
WBC # BLD AUTO: 8.9 X10E3/UL (ref 3.4–10.8)

## 2020-01-07 ENCOUNTER — TELEPHONE (OUTPATIENT)
Dept: FAMILY MEDICINE CLINIC | Facility: CLINIC | Age: 55
End: 2020-01-07

## 2020-01-08 DIAGNOSIS — E61.1 IRON DEFICIENCY: Primary | ICD-10-CM

## 2020-01-08 DIAGNOSIS — R73.01 IMPAIRED FASTING GLUCOSE: ICD-10-CM

## 2020-01-08 RX ORDER — FERROUS SULFATE TAB EC 324 MG (65 MG FE EQUIVALENT) 324 (65 FE) MG
324 TABLET DELAYED RESPONSE ORAL
Qty: 90 TABLET | Refills: 0 | Status: SHIPPED | OUTPATIENT
Start: 2020-01-08 | End: 2021-03-17 | Stop reason: SINTOL

## 2020-01-08 NOTE — PROGRESS NOTES
Low ferritin and iron saturation - will trial iron supplementation for 6 weeks and recheck levels  Denies any dark stools, or heavy menses

## 2020-01-20 ENCOUNTER — TELEPHONE (OUTPATIENT)
Dept: FAMILY MEDICINE CLINIC | Facility: CLINIC | Age: 55
End: 2020-01-20

## 2020-03-18 DIAGNOSIS — M25.50 POLYARTHRALGIA: ICD-10-CM

## 2020-03-18 RX ORDER — CELECOXIB 200 MG/1
CAPSULE ORAL
Qty: 90 CAPSULE | Refills: 1 | OUTPATIENT
Start: 2020-03-18

## 2020-03-19 DIAGNOSIS — M25.50 POLYARTHRALGIA: ICD-10-CM

## 2020-03-19 DIAGNOSIS — E03.9 ACQUIRED HYPOTHYROIDISM: ICD-10-CM

## 2020-03-20 RX ORDER — LEVOTHYROXINE SODIUM 0.05 MG/1
50 TABLET ORAL DAILY
Qty: 90 TABLET | Refills: 1 | Status: SHIPPED | OUTPATIENT
Start: 2020-03-20 | End: 2020-09-17 | Stop reason: SDUPTHER

## 2020-03-20 RX ORDER — CELECOXIB 200 MG/1
200 CAPSULE ORAL DAILY
Qty: 90 CAPSULE | Refills: 1 | Status: SHIPPED | OUTPATIENT
Start: 2020-03-20 | End: 2020-12-16 | Stop reason: SDUPTHER

## 2020-05-29 ENCOUNTER — OFFICE VISIT (OUTPATIENT)
Dept: FAMILY MEDICINE CLINIC | Facility: CLINIC | Age: 55
End: 2020-05-29
Payer: COMMERCIAL

## 2020-05-29 VITALS
DIASTOLIC BLOOD PRESSURE: 82 MMHG | BODY MASS INDEX: 42.17 KG/M2 | SYSTOLIC BLOOD PRESSURE: 134 MMHG | HEART RATE: 96 BPM | HEIGHT: 64 IN | OXYGEN SATURATION: 98 % | TEMPERATURE: 99.1 F | WEIGHT: 247 LBS

## 2020-05-29 DIAGNOSIS — R39.15 URGENCY OF URINATION: Primary | ICD-10-CM

## 2020-05-29 LAB
SL AMB  POCT GLUCOSE, UA: NORMAL
SL AMB LEUKOCYTE ESTERASE,UA: NORMAL
SL AMB POCT BILIRUBIN,UA: NORMAL
SL AMB POCT BLOOD,UA: NORMAL
SL AMB POCT CLARITY,UA: CLEAR
SL AMB POCT COLOR,UA: YELLOW
SL AMB POCT KETONES,UA: NORMAL
SL AMB POCT NITRITE,UA: NORMAL
SL AMB POCT PH,UA: 5
SL AMB POCT SPECIFIC GRAVITY,UA: 1
SL AMB POCT URINE PROTEIN: NORMAL
SL AMB POCT UROBILINOGEN: NORMAL

## 2020-05-29 PROCEDURE — 3008F BODY MASS INDEX DOCD: CPT | Performed by: NURSE PRACTITIONER

## 2020-05-29 PROCEDURE — 81002 URINALYSIS NONAUTO W/O SCOPE: CPT | Performed by: NURSE PRACTITIONER

## 2020-05-29 PROCEDURE — 99213 OFFICE O/P EST LOW 20 MIN: CPT | Performed by: NURSE PRACTITIONER

## 2020-05-29 PROCEDURE — 1036F TOBACCO NON-USER: CPT | Performed by: NURSE PRACTITIONER

## 2020-05-31 LAB
BACTERIA UR CULT: NORMAL
Lab: NORMAL

## 2020-07-28 NOTE — PROGRESS NOTES
Ice pack given:    __x___yes _____no    Discharge instructions signed by patient:    __x___yes _____no    Discharge instructions given to patient:    __x___yes _____no    Discharged via:    __x___amulatory    _____wheelchair    _____stretcher    Stable on discharge:    __x___yes ____no negative

## 2020-09-10 DIAGNOSIS — E03.9 ACQUIRED HYPOTHYROIDISM: ICD-10-CM

## 2020-09-11 RX ORDER — LEVOTHYROXINE SODIUM 0.05 MG/1
TABLET ORAL
Qty: 90 TABLET | Refills: 1 | OUTPATIENT
Start: 2020-09-11

## 2020-09-17 DIAGNOSIS — E03.9 ACQUIRED HYPOTHYROIDISM: ICD-10-CM

## 2020-09-17 RX ORDER — LEVOTHYROXINE SODIUM 0.05 MG/1
50 TABLET ORAL DAILY
Qty: 90 TABLET | Refills: 1 | Status: SHIPPED | OUTPATIENT
Start: 2020-09-17 | End: 2021-03-17 | Stop reason: SDUPTHER

## 2020-12-16 DIAGNOSIS — M25.50 POLYARTHRALGIA: ICD-10-CM

## 2020-12-16 RX ORDER — CELECOXIB 200 MG/1
200 CAPSULE ORAL DAILY
Qty: 90 CAPSULE | Refills: 0 | Status: SHIPPED | OUTPATIENT
Start: 2020-12-16 | End: 2021-03-17 | Stop reason: SDUPTHER

## 2021-03-12 DIAGNOSIS — M25.50 POLYARTHRALGIA: ICD-10-CM

## 2021-03-13 DIAGNOSIS — E03.9 ACQUIRED HYPOTHYROIDISM: ICD-10-CM

## 2021-03-13 RX ORDER — LEVOTHYROXINE SODIUM 0.05 MG/1
TABLET ORAL
Qty: 90 TABLET | Refills: 1 | OUTPATIENT
Start: 2021-03-13

## 2021-03-13 RX ORDER — CELECOXIB 200 MG/1
CAPSULE ORAL
Qty: 90 CAPSULE | Refills: 0 | OUTPATIENT
Start: 2021-03-13

## 2021-03-17 ENCOUNTER — OFFICE VISIT (OUTPATIENT)
Dept: FAMILY MEDICINE CLINIC | Facility: CLINIC | Age: 56
End: 2021-03-17
Payer: COMMERCIAL

## 2021-03-17 VITALS
TEMPERATURE: 98.9 F | HEIGHT: 64 IN | RESPIRATION RATE: 16 BRPM | BODY MASS INDEX: 42.07 KG/M2 | SYSTOLIC BLOOD PRESSURE: 128 MMHG | HEART RATE: 91 BPM | OXYGEN SATURATION: 97 % | DIASTOLIC BLOOD PRESSURE: 84 MMHG | WEIGHT: 246.4 LBS

## 2021-03-17 DIAGNOSIS — E66.01 MORBID OBESITY WITH BMI OF 40.0-44.9, ADULT (HCC): ICD-10-CM

## 2021-03-17 DIAGNOSIS — D50.9 IRON DEFICIENCY ANEMIA, UNSPECIFIED IRON DEFICIENCY ANEMIA TYPE: ICD-10-CM

## 2021-03-17 DIAGNOSIS — M25.50 POLYARTHRALGIA: ICD-10-CM

## 2021-03-17 DIAGNOSIS — Z00.00 ANNUAL PHYSICAL EXAM: ICD-10-CM

## 2021-03-17 DIAGNOSIS — E03.9 ACQUIRED HYPOTHYROIDISM: ICD-10-CM

## 2021-03-17 DIAGNOSIS — D22.9 MULTIPLE ATYPICAL SKIN MOLES: ICD-10-CM

## 2021-03-17 DIAGNOSIS — R73.03 PREDIABETES: ICD-10-CM

## 2021-03-17 DIAGNOSIS — E78.2 MIXED HYPERLIPIDEMIA: ICD-10-CM

## 2021-03-17 DIAGNOSIS — E55.9 VITAMIN D DEFICIENCY: ICD-10-CM

## 2021-03-17 DIAGNOSIS — M25.561 CHRONIC PAIN OF RIGHT KNEE: Primary | ICD-10-CM

## 2021-03-17 DIAGNOSIS — G89.29 CHRONIC PAIN OF RIGHT KNEE: Primary | ICD-10-CM

## 2021-03-17 PROCEDURE — 99396 PREV VISIT EST AGE 40-64: CPT | Performed by: NURSE PRACTITIONER

## 2021-03-17 PROCEDURE — 3725F SCREEN DEPRESSION PERFORMED: CPT | Performed by: NURSE PRACTITIONER

## 2021-03-17 RX ORDER — CELECOXIB 200 MG/1
200 CAPSULE ORAL DAILY
Qty: 90 CAPSULE | Refills: 0 | Status: SHIPPED | OUTPATIENT
Start: 2021-03-17 | End: 2021-06-02

## 2021-03-17 RX ORDER — LEVOTHYROXINE SODIUM 0.05 MG/1
50 TABLET ORAL DAILY
Qty: 30 TABLET | Refills: 1 | Status: SHIPPED | OUTPATIENT
Start: 2021-03-17 | End: 2021-04-01

## 2021-03-17 NOTE — PROGRESS NOTES
[address] [facility]    NAME: Rey Allan  AGE: 48 y o  SEX: female  : 1965     DATE: 3/17/2021     Assessment and Plan:     Problem List Items Addressed This Visit        Other    Hyperlipidemia    Relevant Orders    Lipid panel    Comprehensive metabolic panel    Iron deficiency anemia    Relevant Orders    Ferritin    TIBC    Iron    CBC and differential    Vitamin D 25 hydroxy    Vitamin D deficiency    Relevant Orders    Vitamin D 25 hydroxy      Other Visit Diagnoses     Chronic pain of right knee    -  Primary    Relevant Orders    Ambulatory referral to Orthopedic Surgery    Multiple atypical skin moles        Relevant Orders    Ambulatory referral to Dermatology    Acquired hypothyroidism        Relevant Orders    TSH, 3rd generation with Free T4 reflex    Prediabetes        Relevant Orders    Hemoglobin A1C    UA (URINE) with reflex to Scope    Annual physical exam        Morbid obesity with BMI of 40 0-44 9, adult ([facility] [address] )              Immunizations and preventive care screenings were discussed with patient today  Appropriate education was printed on patient's after visit summary  Counseling:  Alcohol/drug use: discussed moderation in alcohol intake, the recommendations for healthy alcohol use, and avoidance of illicit drug use  Dental Health: discussed importance of regular tooth brushing, flossing, and dental visits  Injury prevention: discussed safety/seat belts, safety helmets, smoke detectors, carbon dioxide detectors, and smoking near bedding or upholstery  Sexual health: discussed sexually transmitted diseases, partner selection, use of condoms, avoidance of unintended pregnancy, and contraceptive alternatives  · Exercise: the importance of regular exercise/physical activity was discussed  Recommend exercise 3-5 times per week for at least 30 minutes  BMI Counseling: Body mass index is 42 29 kg/m²  The BMI is above normal  Nutrition recommendations include decreasing portion sizes, encouraging healthy choices of fruits and vegetables, decreasing fast food intake, consuming healthier snacks, limiting drinks that contain sugar, moderation in carbohydrate intake, increasing intake of lean protein, reducing intake of saturated and trans fat and reducing intake of cholesterol  Exercise recommendations include moderate physical activity 150 minutes/week and strength training exercises  Depression Screening and Follow-up Plan: Patient's depression screening was positive with a PHQ-2 score of 0  Their PHQ-9 score was 6  Clincally patient does not have depression  No treatment is required  Return in 4 weeks (on 4/14/2021)  Chief Complaint:     Chief Complaint   Patient presents with    Physical Exam     Patient would like to see a dermatologist for her skin problems    Medication Refill     Levothyroxine & Celebrex    Knee pain     Patient's been to ortho and has had knee injections (Euflexxa), but this hasn't helped  History of Present Illness:     Adult Annual Physical   Patient here for a comprehensive physical exam  The patient reports problems - skin and knee pain  Diet and Physical Activity  · Diet/Nutrition: poor diet and high fat diet  · Exercise: no formal exercise        Depression Screening  PHQ-9 Depression Screening    PHQ-9:   Frequency of the following problems over the past two weeks:      Little interest or pleasure in doing things: 0 - not at all  Feeling down, depressed, or hopeless: 0 - not at all  Trouble falling or staying asleep, or sleeping too much: 3 - nearly every day  Feeling tired or having little energy: 0 - not at all  Poor appetite or overeating: 3 - nearly every day  Feeling bad about yourself - or that you are a failure or have let yourself or your family down: 0 - not at all  Trouble concentrating on things, such as reading the newspaper or watching television: 0 - not at all  Moving or speaking so slowly that other people could have noticed  Or the opposite - being so fidgety or restless that you have been moving around a lot more than usual: 0 - not at all  Thoughts that you would be better off dead, or of hurting yourself in some way: 0 - not at all  PHQ-2 Score: 0  PHQ-9 Score: 6       General Health  · Sleep: sleeps poorly and gets 7-8 hours of sleep on average  · Hearing: normal - bilateral   · Vision: no vision problems, goes for regular eye exams and wears glasses  · Dental: regular dental visits, brushes teeth twice daily and flosses teeth occasionally  /GYN Health  · Patient is: perimenopausal  · Last menstrual period: 3 months ago  · Contraceptive method: none needed     Review of Systems:     Review of Systems   Constitutional: Negative  Negative for fatigue and fever  HENT: Negative  Eyes: Negative  Respiratory: Negative  Cardiovascular: Negative  Gastrointestinal: Negative  Endocrine: Negative  Genitourinary: Negative  Musculoskeletal: Positive for arthralgias  Negative for gait problem and joint swelling  Skin: Positive for rash  Neurological: Negative  Hematological: Negative  Psychiatric/Behavioral: Negative  Past Medical History:     Past Medical History:   Diagnosis Date    Herpes zoster     Right, at the T6 dermatome   last assessed: 13      Past Surgical History:     Past Surgical History:   Procedure Laterality Date     SECTION      CHOLECYSTECTOMY      US GUIDED BREAST BIOPSY LEFT COMPLETE Left 2019      Social History:     E-Cigarette/Vaping    E-Cigarette Use Never User      E-Cigarette/Vaping Substances    Nicotine No     THC No     CBD No     Flavoring No     Other No     Unknown No      Social History     Socioeconomic History    Marital status: Single     Spouse name: None    Number of children: 1    Years of education: None    Highest education level: None   Occupational History    None   Social Needs    Financial resource strain: Not hard at all   Staci-Alice insecurity     Worry: Never true     Inability: Never true   my[employer] needs     Medical: No     Non-medical: No   Tobacco Use    Smoking status: Never Smoker    Smokeless tobacco: Never Used   Substance and Sexual Activity    Alcohol use: No    Drug use: No    Sexual activity: Not Currently   Lifestyle    Physical activity     Days per week: 0 days     Minutes per session: 0 min    Stress: To some extent   Relationships    Social connections     Talks on phone:  Three times a week     Gets together: Never     Attends Yazdanism service: Never     Active member of club or organization: No     Attends meetings of clubs or organizations: Never     Relationship status: Never     Intimate partner violence     Fear of current or ex partner: No     Emotionally abused: No     Physically abused: No     Forced sexual activity: No   Other Topics Concern    None   Social History Narrative    None      Family History:     Family History   Problem Relation Age of Onset    Diabetes Father     Diabetes Sister     Thyroid disease Sister         Disorder    Autoimmune disease Brother         Disorder    Diabetes Brother     Thyroid disease Brother     Breast cancer Maternal Aunt       Current Medications:     Current Outpatient Medications   Medication Sig Dispense Refill    celecoxib (CeleBREX) 200 mg capsule Take 1 capsule (200 mg total) by mouth daily 90 capsule 0    DICLOFENAC SODIUM PO Take by mouth as needed      levothyroxine 50 mcg tablet Take 1 tablet (50 mcg total) by mouth daily 90 tablet 1    loratadine (CLARITIN) 10 mg tablet Take 10 mg by mouth daily      Misc Natural Products (OSTEO BI-FLEX ADV JOINT SHIELD PO) Take by mouth daily      Multiple Vitamin (MULTIVITAMIN ADULT PO) Take by mouth daily      Probiotic Product (PROBIOTIC PO) Take by mouth daily      Cholecalciferol (VITAMIN D PO) Take by mouth       No current facility-administered medications for this visit  Allergies:     No Known Allergies   Physical Exam:     /84 (BP Location: Left arm, Patient Position: Sitting, Cuff Size: Large)   Pulse 91   Temp 98 9 °F (37 2 °C) (Tympanic)   Resp 16   Ht 5' 4" (1 626 m)   Wt 112 kg (246 lb 6 4 oz)   SpO2 97%   Breastfeeding No   BMI 42 29 kg/m²     Physical Exam  Vitals signs and nursing note reviewed  Constitutional:       General: She is not in acute distress  Appearance: She is obese  She is not ill-appearing  HENT:      Head: Normocephalic and atraumatic  Right Ear: Tympanic membrane, ear canal and external ear normal  There is no impacted cerumen  Left Ear: Tympanic membrane, ear canal and external ear normal  There is no impacted cerumen  Nose: Nose normal  No congestion  Mouth/Throat:      Mouth: Mucous membranes are moist       Pharynx: Oropharynx is clear  No oropharyngeal exudate  Eyes:      General:         Right eye: No discharge  Left eye: No discharge  Extraocular Movements: Extraocular movements intact  Conjunctiva/sclera: Conjunctivae normal       Pupils: Pupils are equal, round, and reactive to light  Neck:      Musculoskeletal: Normal range of motion and neck supple  No neck rigidity or muscular tenderness  Vascular: No carotid bruit  Cardiovascular:      Rate and Rhythm: Normal rate and regular rhythm  Pulses: Normal pulses  Heart sounds: Normal heart sounds  No murmur  Pulmonary:      Effort: Pulmonary effort is normal  No respiratory distress  Breath sounds: Normal breath sounds  No wheezing or rhonchi  Abdominal:      General: Bowel sounds are normal  There is no distension  Palpations: Abdomen is soft  Tenderness: There is no abdominal tenderness  There is no right CVA tenderness, left CVA tenderness or guarding     Musculoskeletal: General: Tenderness present  No swelling or deformity  Right knee: She exhibits decreased range of motion  She exhibits no swelling, no ecchymosis, no deformity, no laceration and no erythema  Tenderness found  Medial joint line tenderness noted  Right lower leg: No edema  Left lower leg: No edema  Lymphadenopathy:      Cervical: No cervical adenopathy  Skin:     General: Skin is warm and dry  Capillary Refill: Capillary refill takes less than 2 seconds  Findings: Rash (Bilateral forearms with dryness slight red discoloration ) present  Comments:   Several atypical moles 1 on her right ankle, 1 on her upper back, scattered papules on face  Neurological:      Mental Status: She is alert and oriented to person, place, and time  Cranial Nerves: No cranial nerve deficit  Motor: No weakness  Coordination: Coordination normal       Gait: Gait normal    Psychiatric:         Mood and Affect: Mood normal          Thought Content:  Thought content normal          Judgment: Judgment normal           Wendy Ward, [address]

## 2021-03-17 NOTE — PATIENT INSTRUCTIONS

## 2021-03-22 ENCOUNTER — OFFICE VISIT (OUTPATIENT)
Dept: OBGYN CLINIC | Facility: CLINIC | Age: 56
End: 2021-03-22
Payer: COMMERCIAL

## 2021-03-22 ENCOUNTER — APPOINTMENT (OUTPATIENT)
Dept: RADIOLOGY | Facility: CLINIC | Age: 56
End: 2021-03-22
Payer: COMMERCIAL

## 2021-03-22 VITALS
HEIGHT: 64 IN | WEIGHT: 245 LBS | SYSTOLIC BLOOD PRESSURE: 118 MMHG | BODY MASS INDEX: 41.83 KG/M2 | DIASTOLIC BLOOD PRESSURE: 72 MMHG

## 2021-03-22 DIAGNOSIS — G89.29 CHRONIC PAIN OF BOTH KNEES: ICD-10-CM

## 2021-03-22 DIAGNOSIS — M25.561 CHRONIC PAIN OF BOTH KNEES: ICD-10-CM

## 2021-03-22 DIAGNOSIS — M25.562 CHRONIC PAIN OF BOTH KNEES: ICD-10-CM

## 2021-03-22 DIAGNOSIS — M17.12 PRIMARY OSTEOARTHRITIS OF LEFT KNEE: Primary | ICD-10-CM

## 2021-03-22 DIAGNOSIS — M17.11 LOCALIZED OSTEOARTHRITIS OF RIGHT KNEE: ICD-10-CM

## 2021-03-22 PROCEDURE — 73564 X-RAY EXAM KNEE 4 OR MORE: CPT

## 2021-03-22 PROCEDURE — 99203 OFFICE O/P NEW LOW 30 MIN: CPT | Performed by: ORTHOPAEDIC SURGERY

## 2021-03-22 PROCEDURE — 77073 BONE LENGTH STUDIES: CPT

## 2021-03-24 LAB
25(OH)D3+25(OH)D2 SERPL-MCNC: 24.3 NG/ML (ref 30–100)
25(OH)D3+25(OH)D2 SERPL-MCNC: 25.1 NG/ML (ref 30–100)
ALBUMIN SERPL-MCNC: 4.2 G/DL (ref 3.8–4.9)
ALBUMIN/GLOB SERPL: 1.5 {RATIO} (ref 1.2–2.2)
ALP SERPL-CCNC: 78 IU/L (ref 39–117)
ALT SERPL-CCNC: 27 IU/L (ref 0–32)
APPEARANCE UR: CLEAR
AST SERPL-CCNC: 25 IU/L (ref 0–40)
BASOPHILS # BLD AUTO: 0 X10E3/UL (ref 0–0.2)
BASOPHILS NFR BLD AUTO: 0 %
BILIRUB SERPL-MCNC: 0.3 MG/DL (ref 0–1.2)
BILIRUB UR QL STRIP: NEGATIVE
BUN SERPL-MCNC: 12 MG/DL (ref 6–24)
BUN/CREAT SERPL: 17 (ref 9–23)
CALCIUM SERPL-MCNC: 9.9 MG/DL (ref 8.7–10.2)
CHLORIDE SERPL-SCNC: 101 MMOL/L (ref 96–106)
CHOLEST SERPL-MCNC: 267 MG/DL (ref 100–199)
CHOLEST/HDLC SERPL: 5.3 RATIO (ref 0–4.4)
CO2 SERPL-SCNC: 20 MMOL/L (ref 20–29)
COLOR UR: YELLOW
CREAT SERPL-MCNC: 0.71 MG/DL (ref 0.57–1)
EOSINOPHIL # BLD AUTO: 0.2 X10E3/UL (ref 0–0.4)
EOSINOPHIL NFR BLD AUTO: 4 %
ERYTHROCYTE [DISTWIDTH] IN BLOOD BY AUTOMATED COUNT: 13.6 % (ref 11.7–15.4)
EST. AVERAGE GLUCOSE BLD GHB EST-MCNC: 120 MG/DL
FERRITIN SERPL-MCNC: 20 NG/ML (ref 15–150)
GLOBULIN SER-MCNC: 2.8 G/DL (ref 1.5–4.5)
GLUCOSE SERPL-MCNC: 94 MG/DL (ref 65–99)
GLUCOSE UR QL: NEGATIVE
HBA1C MFR BLD: 5.8 % (ref 4.8–5.6)
HCT VFR BLD AUTO: 38.2 % (ref 34–46.6)
HDLC SERPL-MCNC: 50 MG/DL
HGB BLD-MCNC: 13.2 G/DL (ref 11.1–15.9)
HGB UR QL STRIP: NEGATIVE
IMM GRANULOCYTES # BLD: 0 X10E3/UL (ref 0–0.1)
IMM GRANULOCYTES NFR BLD: 0 %
IRON SATN MFR SERPL: 15 % (ref 15–55)
IRON SERPL-MCNC: 60 UG/DL (ref 27–159)
KETONES UR QL STRIP: NEGATIVE
LDLC SERPL CALC-MCNC: 159 MG/DL (ref 0–99)
LDLC SERPL DIRECT ASSAY-MCNC: 178 MG/DL (ref 0–99)
LEUKOCYTE ESTERASE UR QL STRIP: NEGATIVE
LYMPHOCYTES # BLD AUTO: 1.6 X10E3/UL (ref 0.7–3.1)
LYMPHOCYTES NFR BLD AUTO: 27 %
MCH RBC QN AUTO: 30.6 PG (ref 26.6–33)
MCHC RBC AUTO-ENTMCNC: 34.6 G/DL (ref 31.5–35.7)
MCV RBC AUTO: 88 FL (ref 79–97)
MICRO URNS: NORMAL
MONOCYTES # BLD AUTO: 0.5 X10E3/UL (ref 0.1–0.9)
MONOCYTES NFR BLD AUTO: 10 %
NEUTROPHILS # BLD AUTO: 3.3 X10E3/UL (ref 1.4–7)
NEUTROPHILS NFR BLD AUTO: 59 %
NITRITE UR QL STRIP: NEGATIVE
PH UR STRIP: 6 [PH] (ref 5–7.5)
PLATELET # BLD AUTO: 270 X10E3/UL (ref 150–450)
POTASSIUM SERPL-SCNC: 4.6 MMOL/L (ref 3.5–5.2)
PROT SERPL-MCNC: 7 G/DL (ref 6–8.5)
PROT UR QL STRIP: NEGATIVE
RBC # BLD AUTO: 4.32 X10E6/UL (ref 3.77–5.28)
SL AMB EGFR AFRICAN AMERICAN: 111 ML/MIN/1.73
SL AMB EGFR NON AFRICAN AMERICAN: 96 ML/MIN/1.73
SL AMB T4, FREE (DIRECT): 1.06 NG/DL (ref 0.82–1.77)
SL AMB VLDL CHOLESTEROL CALC: 58 MG/DL (ref 5–40)
SODIUM SERPL-SCNC: 137 MMOL/L (ref 134–144)
SP GR UR: 1.01 (ref 1–1.03)
TIBC SERPL-MCNC: 402 UG/DL (ref 250–450)
TRIGL SERPL-MCNC: 309 MG/DL (ref 0–149)
TSH SERPL DL<=0.005 MIU/L-ACNC: 4.66 UIU/ML (ref 0.45–4.5)
UIBC SERPL-MCNC: 342 UG/DL (ref 131–425)
UROBILINOGEN UR STRIP-ACNC: 0.2 MG/DL (ref 0.2–1)
WBC # BLD AUTO: 5.7 X10E3/UL (ref 3.4–10.8)

## 2021-03-26 ENCOUNTER — CONSULT (OUTPATIENT)
Dept: DERMATOLOGY | Facility: CLINIC | Age: 56
End: 2021-03-26
Payer: COMMERCIAL

## 2021-03-26 VITALS — TEMPERATURE: 99.3 F | BODY MASS INDEX: 42 KG/M2 | HEIGHT: 64 IN | WEIGHT: 246 LBS

## 2021-03-26 DIAGNOSIS — B07.9 VERRUCA VULGARIS: Primary | ICD-10-CM

## 2021-03-26 DIAGNOSIS — D22.9 MULTIPLE ATYPICAL SKIN MOLES: ICD-10-CM

## 2021-03-26 DIAGNOSIS — D18.01 CHERRY ANGIOMA: ICD-10-CM

## 2021-03-26 DIAGNOSIS — L91.8 ACROCHORDON: ICD-10-CM

## 2021-03-26 DIAGNOSIS — L82.1 SEBORRHEIC KERATOSIS: ICD-10-CM

## 2021-03-26 DIAGNOSIS — L72.0 MILIUM: ICD-10-CM

## 2021-03-26 PROCEDURE — 99204 OFFICE O/P NEW MOD 45 MIN: CPT | Performed by: STUDENT IN AN ORGANIZED HEALTH CARE EDUCATION/TRAINING PROGRAM

## 2021-03-26 PROCEDURE — 17111 DESTRUCTION B9 LESIONS 15/>: CPT | Performed by: STUDENT IN AN ORGANIZED HEALTH CARE EDUCATION/TRAINING PROGRAM

## 2021-03-26 PROCEDURE — 11900 INJECT SKIN LESIONS </W 7: CPT | Performed by: STUDENT IN AN ORGANIZED HEALTH CARE EDUCATION/TRAINING PROGRAM

## 2021-03-26 PROCEDURE — 1036F TOBACCO NON-USER: CPT | Performed by: STUDENT IN AN ORGANIZED HEALTH CARE EDUCATION/TRAINING PROGRAM

## 2021-03-26 RX ORDER — CANDIDA ALBICANS 1000 [PNU]/ML
0.3 INJECTION, SOLUTION INTRADERMAL ONCE
Status: COMPLETED | OUTPATIENT
Start: 2021-03-26 | End: 2021-03-26

## 2021-03-26 RX ADMIN — CANDIDA ALBICANS 0.3 ML: 1000 INJECTION, SOLUTION INTRADERMAL at 15:01

## 2021-03-26 NOTE — PROGRESS NOTES
[facility] Note     Patient Name: Jace Queen  Encounter Date: 03/26/2021     Have you been cared for by a [facility] D[facility]atologist in the last 3 years and, if so, which one? No    · Have you traveled outside of the [facility] in the past 3 months? No     May we call your Preferred Phone number to discuss your specific medical information? Yes     May we leave a detailed message that includes your specific medical information? Yes      Today's Chief Concerns:   Concern #1:  Full body skin exam    Concern #2:  Spot on right ankle and back    Past Medical History:  Have you personally ever had or currently have any of the following? · Skin cancer (such as Melanoma, Basal Cell Carcinoma, Squamous Cell Carcinoma? (If Yes, please provide more detail)- YES, Per patient, had a BCC on her left upper arm removed by the MOHS procedure 6 years ago   · Eczema: No  · Psoriasis: No  · HIV/AIDS: No  · Hepatitis B or C: No  · Tuberculosis: No  · Systemic Immunosuppression such as Diabetes, Biologic or Immunotherapy, Chemotherapy, Organ Transplantation, Bone Marrow Transplantation (If YES, please provide more detail): No  · Radiation Treatment (If YES, please provide more detail): No  · Any other major medical conditions/concerns? (If Yes, which types)- No    Social History:    What is/was your primary occupation?      What are your hobbies/past-times? Reading     Family history:    Have any of your "first degree relatives" (parent, brother, sister, or child) had any of the following       · Skin cancer such as Melanoma or Merkel Cell Carcinoma or Pancreatic Cancer? No  · Eczema, Asthma, Hay Fever or Seasonal Allergies: No  · Psoriasis or Psoriatic Arthritis: No  · Do any other medical conditions seem to run in your family? If Yes, what condition and which relatives?   YES, Diabetes     Current Medications (update all dermatological medications before printing patient's AVS):    Current Outpatient Medications:     celecoxib (CeleBREX) 200 mg capsule, Take 1 capsule (200 mg total) by mouth daily, Disp: 90 capsule, Rfl: 0    Cholecalciferol (VITAMIN D PO), Take by mouth, Disp: , Rfl:     DICLOFENAC SODIUM PO, Take by mouth as needed, Disp: , Rfl:     levothyroxine 50 mcg tablet, Take 1 tablet (50 mcg total) by mouth daily, Disp: 30 tablet, Rfl: 1    loratadine (CLARITIN) 10 mg tablet, Take 10 mg by mouth daily, Disp: , Rfl:     Misc Natural Products (OSTEO BI-FLEX ADV JOINT SHIELD PO), Take by mouth daily, Disp: , Rfl:     Multiple Vitamin (MULTIVITAMIN ADULT PO), Take by mouth daily, Disp: , Rfl:     Probiotic Product (PROBIOTIC PO), Take by mouth daily, Disp: , Rfl:       Review of Systems/System Alerts:  Have you recently had or currently have any of the following? If YES, what are you doing for the problem? · Fever, chills or unintended weight loss: No  · Sudden loss or change in your vision: No   · Nausea, vomiting or blood in your stool: No  · Painful or swollen joints: Painful knees - pt on Celebrex   · Wheezing or cough: No  · Changing mole or non-healing wound: No  · Nosebleeds: No  · Excessive sweating: No  · Easy or prolonged bleeding? No  · Over the last 2 weeks, how often have you been bothered by the following problems? · Taking little interest or pleasure in doing things: 1 - Not at All  · Feeling down, depressed, or hopeless: 1 - Not at All  · Rapid heartbeat with epinephrine:  No  · FEMALES ONLY:    · Are you pregnant or planning to become pregnant? No  · Are you currently or planning to be nursing or breast feeding? No  · Any known allergies?   YES, See below   Allergies   Allergen Reactions    Molds & Smuts Other (See Comments)     congestion   ·       PHYSICAL EXAM:       Was a chaperone (Derm Clinical Assistant) present throughout the entire Physical Exam? Yes     Did the Dermatology Team specifically  the patient on the importance of a Full Skin Exam to be sure that nothing is missed clinically? Yes}  o Did the patient request or accept a Full Skin Exam?  Yes  o Did the patient specifically refuse to have the areas "under-the-bra" examined by the Dermatologist? No  o Did the patient specifically refuse to have the areas "under-the-underwear" examined by the Dermatologist? No      CONSTITUTIONAL (Height, Weight, and Temperature must be recorded):   Vitals:    03/26/21 0951   Temp: 99 3 °F (37 4 °C)   TempSrc: Tympanic   Weight: 112 kg (246 lb)   Height: 5' 4" (1 626 m)       PSYCH: Normal mood and affect  EYES: Normal conjunctiva  ENT: Normal lips and oral mucosa  CARDIOVASCULAR: No edema  RESPIRATORY: Normal respirations  HEME/LYMPH/IMMUNO:  No regional lymphadenopathy except as noted below in ASSESSMENT AND PLAN BY DIAGNOSIS    SKIN:  FULL ORGAN SYSTEM EXAM  Hair, Scalp, Ears, Face Normal except as noted below in Assessment   Neck Normal except as noted below in Assessment   Right Arm/Hand/Fingers Normal except as noted below in Assessment   Left Arm/Hand/Fingers Normal except as noted below in Assessment   Chest/Breasts/Axillae Viewed areas Normal except as noted below in Assessment   Abdomen, Umbilicus Normal except as noted below in Assessment   Back/Spine Normal except as noted below in Assessment   Buttocks Normal except as noted below in Assessment   Right Leg, Foot, Toes Normal except as noted below in Assessment   Left Leg, Foot, Toes Normal except as noted below in Assessment        ASSESSMENT AND PLAN BY DIAGNOSIS:    History of Present Condition:     Duration:  How long has this been an issue for you?    o  years, recently has been growing for the past 3 months    Location Affected:  Where on the body is this affecting you?    o  right ankle    Quality:  Is there any bleeding, pain, itch, burning/irritation, or redness associated with the skin lesion?     o  denies    Severity:  Describe any bleeding, pain, itch, burning/irritation, or redness on a scale of 1 to 10 (with 10 being the worst)  o  denies   Timing:  Does this condition seem to be there pretty constantly or do you notice it more at specific times throughout the day? o  constant   Context:  Have you ever noticed that this condition seems to be associated with specific activities you do?    o  denies   Modifying Factors:    o Anything that seems to make the condition worse?    -  denies  o What have you tried to do to make the condition better?    -  denies   Associated Signs and Symptoms:  Does this skin lesion seem to be associated with any of the following:  o  denies        HISTORY OF BASAL CELL CARCINOMA    Physical Exam:    Anatomic Location Affected:  Left upper arm    Morphological Description of scar:  Well healed linear scar with no scalling or erythem or nodulairty    Suspected Recurrence: No   Pertinent Positives:   Pertinent Negatives: Additional History of Present Condition:  History of basal cell carcinoma 6 years ago on left upper armwith no sign of recurrence  Assessment and Plan:  Based on a thorough discussion of this condition and the management approach to it (including a comprehensive discussion of the known risks, side effects and potential benefits of treatment), the patient (family) agrees to implement the following specific plan:   Skin checks every 2 years    Instructed to look out for any changing spots     How can basal cell carcinoma be prevented? The most important way to prevent BCC is to avoid sunburn  This is especially important in childhood and early life  Fair skinned individuals and those with a personal or family history of BCC should protect their skin from sun exposure daily, year-round and lifelong     Stay indoors or under the shade in the middle of the day    Wear covering clothing    Apply high protection factor SPF50+ broad-spectrum sunscreens generously to exposed skin if outdoors    Avoid indoor tanning (sun beds, solaria)   Oral nicotinamide (vitamin B3) in a dose of 500 mg twice daily may reduce the number and severity of BCCs  What is the outlook for basal cell carcinoma? Most BCCs are cured by treatment  Cure is most likely if treatment is undertaken when the lesion is small  About 50% of people with BCC develop a second one within 3 years of the first  They are also at increased risk of other skin cancers, especially melanoma  Regular self-skin examinations and long-term annual skin checks by an experienced health professional are recommended  VERRUCA VULGARIS ("Common Warts")    Physical Exam:   Anatomic Location Affected:  Left plantar foot and right plantar foot and right index finger   Morphological Description:  Verrucous papules on right finer (2nd) and on plantar foot numerous verrucous papules and plaques   Pertinent Positives:   Pertinent Negatives: Additional History of Present Condition:  Present for years  Patient states they have grown in the past months  Patient states they are bothersome  Assessment and Plan:  Based on a thorough discussion of this condition and the management approach to it (including a comprehensive discussion of the known risks, side effects and potential benefits of treatment), the patient (family) agrees to implement the following specific plan:   Cryotherapy done in office today x 1 on right hand (this is tender lesion)   Candida intralesional injection done in office today to two spots on left plantar foot   After healed from today's treatments, patient instructed to use Compound W on the areas nightly followed by duct tape over the areas with Compound W, remove in AM and repeat daily  Verruca Vulgaris  A verruca is a common growth of the skin caused by infection by human papilloma virus (HPV)  There are many strains of the virus that cause different types of warts on the body   The virus infects the most superficial layers of the skin, causing increased production of skin cells and thickening  Warts can be spread through direct contact with infected skin and may spread to other parts of the body if scratched or picked  A verruca is more commonly called a "wart " Warts are particularly common in school-aged children but can arise at any age  Patients who have a history of eczema are especially prone due to impaired skin barrier  Those taking immunosuppressive drugs or with HIV infections may experience prolonged symptoms despite treatment  Warts generally have a rough surface with a tiny black dot sometimes observed in the middle of each scaly spot  They can range in size from a small bump to large scaly growths  Common warts are often found on the backs of fingers or toes, around the nails, and on the knees  Plantar warts can grow inwardly on the soles of the feet causing pain  There are many possible ways to treat warts and sometimes several different treatments are needed to get the warts to go away completely  There is no single perfect treatment for warts, and successful treatment can take many months  In-office treatments usually require multiple visits, and include:  1) Cryotherapy  a cold spray with liquid nitrogen will destroy the infected cells but may lead to discomfort and blistering  It may also leave a permanent white alexia or scar  2) Electrosurgery (curettage and cautery) can be used for large resistant warts which involves shaving the growth down and burning the base  It is performed under local anesthesia and may leave a permanent scar    3) Candida (yeast) antigen injections  These are extracts of the common yeast (Candida) that cannot cause an infection  The medication is injected into/under the wart  It is thought to stimulate the immune system to recognize the wart virus and attack it  Multiple injections are often needed about one month apart      There are also several at-home wart treatments:    1) Soak the warts in warm water for 5 minutes every night followed by gentle filing with a nail file or pumice stone  2) Topical salicylic acid or similar compounds work by removing the dead surface skin cells  a  Apply the medicine directly to the wart, wait for it to dry completely, then cover with duct tape overnight   b  Repeat until the wart is gone, which can take 2-4 months  c  Do not use on the face or groin area   d  If the wart paint makes the skin sore, stop treatment until the discomfort has settled, then recommence as above   e  Take care to keep the chemical off normal skin  3) Podophyllin is a cytotoxic agent used in some products and must not be used in pregnancy or women considering pregnancy  4) Some prescription medications include   a  Topical retinoids (adapalene, tretinoin, tazarotene), 5-fluorouracil (Efudex) or imiquimod (Aldara) creams are sometimes used to treat flat warts or warts on the face and other sensitive anatomical areas  They are usually applied directly to the warts once a day for 2-4 months and can be irritating  These treatments should only be used as directed by your health care provider  b  Systemic treatment with oral cimetidine (Tagamet) may help boost the immune system against the wart virus in patients, some of the time  Initiation of cimetidine therapy should ONLY be done under the supervision of your health care provider, who can discuss possible side effects and drug-to-drug interactions of this specific treatment  PROCEDURE:  DESTRUCTION OF BENIGN LESIONS: Cryotherapy   After a thorough discussion of treatment options and risk/benefits/alternatives (including but not limited to local pain, scarring, dyspigmentation, blistering, and possible superinfection), verbal and written consent were obtained and the aforementioned lesions were treated on with cryotherapy using liquid nitrogen x 1 cycle for 5-10 seconds       TOTAL NUMBER of 1 lesions were treated today on the ANATOMIC LOCATION: right index finger     The patient tolerated the procedure well, and after-care instructions were provided  PROCEDURE:  INTRALESIONAL JOYCE ANTIGEN    Purpose: Candida antigen is used "off label" as an immunotherapy agent in the treatment of warts  This is widely used technique endorsed by many pediatric dermatologists because of its utility in treating multiple lesions with minimal pain and discomfort and resulting sequelae to the treated areas  Indications: It is used "off label" for the treatment of warts  Potential Side Effects: The patient signifies understanding that Candida antigen injection can potentially cause early and/or delayed adverse effects such as:    Pain    Local immune response    Bleeding    Skin discoloration   Swelling    Flu-like illness with increased lymphnodes   Serious allergic reaction (anaphylaxis)    PROCEDURE NOTE:  After verbal and written consent were obtained, the to-be-treated area was wiped and cleaned with rubbing alcohol 70%  Then, a total of 0 3 mL of refrigerated Candida antigen (Lot# ; Expiration 2022-April- 16, NDC#: 26740-418-54) was injected intralesionally into a total of 2 lesion/s on the following anatomic areas:  Left foot using a 1-mL tuberculin syringe and a 30-gauge needle  There was less than 1 mL of blood loss and little to no discomfort  The area was bandaged with a Band-aid  The patient tolerated the procedure well and remained in the office for observation  With no signs of an adverse reaction, the patient was eventually discharged from clinic  SEBORRHEIC KERATOSIS; NON-INFLAMED    Physical Exam:   Anatomic Location Affected:  Left lower neck    Morphological Description:  Flat and raised, waxy, smooth to warty textured, yellow to brownish-grey to dark brown to blackish, discrete, "stuck-on" appearing papules     Pertinent Positives:   Pertinent Negatives: Additional History of Present Condition:  Patient reports new bumps on the skin  Denies itch, burn, pain, bleeding or ulceration  Present constantly; nothing seems to make it worse or better  No prior treatment  Assessment and Plan:  Based on a thorough discussion of this condition and the management approach to it (including a comprehensive discussion of the known risks, side effects and potential benefits of treatment), the patient (family) agrees to implement the following specific plan:   Benign, no treatment necessary    Patient instructed to start using Amlactin cream over the counter     Seborrheic Keratosis  A seborrheic keratosis is a harmless warty spot that appears during adult life as a common sign of skin aging  Seborrheic keratoses can arise on any area of skin, covered or uncovered, with the usual exception of the palms and soles  They do not arise from mucous membranes  Seborrheic keratoses can have highly variable appearance  Seborrheic keratoses are extremely common  It has been estimated that over 90% of adults over the age of 61 years have one or more of them  They occur in males and females of all races, typically beginning to erupt in the 35s or 45s  They are uncommon under the age of 21 years  The precise cause of seborrhoeic keratoses is not known  Seborrhoeic keratoses are considered degenerative in nature  As time goes by, seborrheic keratoses tend to become more numerous  Some people inherit a tendency to develop a very large number of them; some people may have hundreds of them  The name "seborrheic keratosis" is misleading, because these lesions are not limited to a seborrhoeic distribution (scalp, mid-face, chest, upper back), nor are they formed from sebaceous glands, nor are they associated with sebum -- which is greasy    Seborrheic keratosis may also be called "SK," "Seb K," "basal cell papilloma," "senile wart," or "barnacle "      Researchers have noted:   Eruptive seborrhoeic keratoses can follow sunburn or dermatitis   Skin friction may be the reason they appear in body folds   Viral cause (e g , human papillomavirus) seems unlikely   Stable and clonal mutations or activation of FRFR3, PIK3CA, KENNY, AKT1 and EGFR genes are found in seborrhoeic keratoses   Seborrhoeic keratosis can arise from solar lentigo   FRFR3 mutations also arise in solar lentigines  These mutations are associated with increased age and location on the head and neck, suggesting a role of ultraviolet radiation in these lesions   Seborrheic keratoses do not harbour tumour suppressor gene mutations   Epidermal growth factor receptor inhibitors, which are used to treat some cancers, often result in an increase in verrucal (warty) keratoses  There is no easy way to remove multiple lesions on a single occasion  Unless a specific lesion is "inflamed" and is causing pain or stinging/burning or is bleeding, most insurance companies do not authorize treatment  GARZON ANGIOMAS    Physical Exam:   Anatomic Location Affected:  Trunk and extremities    Morphological Description:  Scattered cherry red, 1-4 mm papules   Pertinent Positives:   Pertinent Negatives: Additional History of Present Condition:  Present for exam     Assessment and Plan:  Based on a thorough discussion of this condition and the management approach to it (including a comprehensive discussion of the known risks, side effects and potential benefits of treatment), the patient (family) agrees to implement the following specific plan:   Benign, no treatment necessary     Assessment and Plan:    Cherry angioma, also known as Laban Owsley spots, are benign vascular skin lesions  A "cherry angioma" is a firm red, blue or purple papule, 0 1-1 cm in diameter  When thrombosed, they can appear black in colour until evaluated with a dermatoscope when the red or purple colour is more easily seen   Bart Sanchez angioma may develop on any part of the body but most often appear on the scalp, face, lips and trunk  An angioma is due to proliferating endothelial cells; these are the cells that line the inside of a blood vessel  Angiomas can arise in early life or later in life; the most common type of angioma is a cherry angioma  Cherry angiomas are very common in males and females of any age or race  They are more noticeable in white skin than in skin of colour  They markedly increase in number from about the age of 36  There may be a family history of similar lesions  Eruptive cherry angiomas have been rarely reported to be associated with internal malignancy  The cause of angiomas is unknown  Genetic analysis of cherry angiomas has shown that they frequently carry specific somatic missense mutations in the GNAQ and GNA11 (Q209H) genes, which are involved in other vascular and melanocytic proliferations  Cherry angioma is usually diagnosed clinically and no investigations are necessary for the majority of lesions  It has a characteristic red-clod or lobular pattern on dermatoscopy (called lacunar pattern using conventional pattern analysis)  When there is uncertainty about the diagnosis, a biopsy may be performed  The angioma is composed of venules in a thickened papillary dermis  Collagen bundles may be prominent between the lobules  Cherry angiomas are harmless, so they do not usually have to be treated  Occasionally, they are removed to exclude a malignant skin lesion such as a nodular melanoma or because they are irritated or bleeding (and a subsequent risk for infection)  To decrease friction over the lesions, we recommend Neutrogena Daily Defense SPF 50+ at least 3 times a day  ACROCHORDON ("SKIN TAG")    Physical Exam:   Anatomic Location Affected:  Skin folds of the neck    Morphological Description:  Pedunculated papule    Pertinent Positives:   Pertinent Negatives:     Additional History of Present Condition:  Present for years   Assessment and Plan:  Based on a thorough discussion of this condition and the management approach to it (including a comprehensive discussion of the known risks, side effects and potential benefits of treatment), the patient (family) agrees to implement the following specific plan:   Discussed can be removed through a cosmetic visit: $150 00 per 10 spots     Skin tags are common, soft, harmless skin lesions that are also called, in the appropriate settings, papillomas, fibroepithelial polyps, and soft fibromas  They are made up of loosely arranged collagen fibers and blood vessels surrounded by a thickened or thinned-out epidermis  Skin tags tend to develop in both men and women as we grow older  They are usually found on the skin folds (neck, armpits, groin)  It is not known what specifically causes skin tags  Certain factors, though, do appear to play a role:   Chaffing and irritation from skin rubbing together   High levels of growth factors (as seen, for example, in pregnancy or in acromegaly/gigantism)   Insulin resistance   Human papillomavirus (wart virus)    We discussed that most skin tags do not need to be treated unless they are specifically causing the patient physical distress or limitation or pose a risk for a larger problem such as an infection that forms secondary to excoriation or chronic irritation      We had a thorough discussion of treatment options and specific risks (including that any procedural treatment may not be covered by insurance and would then be the patient's responsibility) and benefits/alternatives including but not limited to the following:   Cryotherapy (freezing)   Shave removal   Surgical excision (snip excision with scissors)   Electrosurgery   Ligation (we do not do this procedure and counseled against it due to risk of tissue necrosis and infection)        DERMATOFIBROMA    Physical Exam:   Anatomic Location Affected & Description: tan to brown firm papule/s, indent with lateral pressure on the right shin    Pertinent Positives:   Pertinent Negatives: Additional History of Present Condition:  Present for years     Assessment and Plan:  Based on a thorough discussion of this condition and the management approach to it (including a comprehensive discussion of the known risks, side effects and potential benefits of treatment), the patient (family) agrees to implement the following specific plan:    Pt reassured of benign diagnosis, no treatment required  Discussed liquid nitrogen and IL kenalog if desired to help flatten lesion/s    Pt aware that if lesions ever have concerning changes such as rapid enlargement, start to become non healing areas or frequently traumatized, please call to let us know as these changes may require biopsy or we can discuss more definitve removal     DERMATOFIBROMA, PATIENT INFORMATION:  A dermatofibroma is a common benign fibrous nodule that most often arises on the skin of the lower legs  A dermatofibroma is also called a "cutaneous fibrous histiocytoma "  Dermatofibromas occur at all ages and in people of every ethnicity  They are more common in women than in men  It is not clear if dermatofibroma is a reactive process or if it is a neoplasm  The lesions are made up of proliferating fibroblasts  Histiocytes may also be involved  They are sometimes attributed to an insect bite or ingrownhair or local trauma, but not consistently  They may be more numerous in patients with altered immunity  Dermatofibromas most often occur on the legs and arms, but may also arise on the trunk or any site of the body  Typical clinical features include the following:   People may have 1 or up to 15 lesions   Size varies from 0 5-1 5 cm diameter; most lesions are 7-10 mm diameter   They are firm nodules tethered to the skin surface and mobile over subcutaneous tissue     The skin "dimples" on pinching the lesion   Color may be pink to light brown in white skin, and dark brown to black in dark skin; some appear paler in the center   They do not usually cause symptoms, but they are sometimes painful or itchy   Because they are often raised lesions, they may be traumatized, for example by a razor   Occasionally dozens may erupt within a few months, usually in the setting of immunosuppression (for example autoimmune disease, cancer or certain medications)   Dermatofibroma does not give rise to cancer  However, occasionally, it may be mistaken for dermatofibrosarcoma or desmoplastic melanoma  A dermatofibroma is harmless and seldom causes any symptoms  Usually, only reassurance is needed  If it is nuisance or causing concern, the lesion can be removed surgically, resulting in a scar that is, by definition, usually longer in diameter than the widest portion of the dermatofibroma  Cryotherapy, shave biopsy and laser surgery are rarely completely successful  Skin punch biopsy or incisional biopsy may be undertaken if there is an atypical feature such as recent enlargement, ulceration, or asymmetrical structures and colours on dermatoscopy  MILIA, SEBACEOUS HYPERPLASIA    Physical Exam:   Anatomic Location Affected: Face   Morphological Description:  White papules on mid face as well as scattered skin colored shiny papule with central dell   Pertinent Positives:   Pertinent Negatives: Additional History of Present Condition: Present for years, no treatments tried  Assessment and Plan:  Based on a thorough discussion of this condition and the management approach to it (including a comprehensive discussion of the known risks, side effects and potential benefits of treatment), the patient (family) agrees to implement the following specific plan:   Discussed ablative CO2 laser treatments for more wide spread tx vs extraction of milia and destruction of seb   Hyperplasia with electrodessication   Patient instructed to start using a Vitamin A cream (Adaplalene) over the counter     Assessment and Plan  A milium is a small cyst containing keratin (the skin protein); they are usually multiple and are then known as milia  These harmless cysts present as tiny pearly-white bumps just under the surface of the skin  Milia are common in all ages and both sexes  They most often arise on the face and are particularly prominent on the eyelids and cheeks, but they may occur elsewhere  There are various kinds of milia    milia: Affect 40-50% of  babies, few to numerous lesions, often seen on the nose, but may also arise inside the mouth on the mucosa (Daly pearls) or palate (Suleiman nodules) or more widely on the scalp, face and upper trunk, Heal spontaneously within a few weeks of birth   Primary milia in children and adults: found around eyelids, cheeks, forehead and genitalia, in young children, a row of milia may appear along the nasal crease, may clear in a few weeks or persist for months or longer   Juvenile milia: associated with Rombo syndrome, basal cell naevus syndrome, Vtofd-Ldvgj-Ruttfvmz syndrome, pachyonychia congenita, Oneal syndrome and other genetic disorders, may be congenital (present at birth) or appear later in life   Milia en plaque: multiple milia appear on within an inflamed plaque up to several centimeters in diameter, usually found on an eyelid, behind the ear, on a cheek or jaw  3  Affect children and adults, especially middle-aged women  4  Sometimes associated with another skin disease including pseudoxanthoma elasticum, discoid lupus erythematosus, lichen planus   Multiple eruptive milia: crops of numerous milia appear over a few weeks to months, lesions may be asymptomatic or itchy, most often affect the face, upper arms and upper trunk     Traumatic milia: occur at the site of injury as skin heals, arise from eccrine sweat ducts, examples include thermal burns, dermabrasion, blistering rashes such as bullous pemphigoid, often seen on the back of hands and fingers in porphyria cutanea tarda, a milia-like calcified nodule may develop after  heel stick blood test     Milia associated with drugs: may rarely follow the use of topical medication, such as phenols, hydroquinone, 5-fluorouracil cream, and a corticosteroid  Milia have a characteristic appearance  However, on occasion, a skin biopsy may be performed  This shows a small epidermoid cyst coming from a vellus hair follicle  Milia should be distinguished from other types of cyst, comedones, xanthelasma and syringomas  Colloid milia are smith coloured bumps on cheeks and temples associated with excessive exposure to sunlight  They should also be distinguished from milia-like cysts noted on dermoscopy in seborrhoeic keratoses, papillomatous moles and some basal cell carcinomas  Milia do not need to be treated unless they are a cause for concern for the patient  They often clear up by themselves within a few months  Where possible, further trauma should be minimised to reduce the development of new lesions   The lesion may be de-roofed using a sterile needle or blade and the contents squeezed or pricked out   They may be destroyed using diathermy and curettage, or cryotherapy   For widespread lesions, topical retinoids may be helpful   Chemical peels, dermabrasion and laser ablation have been reported to be effective when used for very extensive milia   Milia en plaque may improve with minocycline (a tetracycline antibiotic)  SKIN CARE RECOMMENDATIONS     Morning    1  Wash your face with a gentle cleanser  Some examples are listed below but get one that works for you  Avoid ones with harsh scrubbing beads   a  Cerave hydrating facial cleanser  b  LaRoche-Posay hydrating gentle cleanser  c  Neutrogena ultra gentle hydrating cleanser     2  Apply Vitamin C   Look for "Ascorbic acid" as an ingredient  This is the most effective form of Vitamin C  Some examples are listed below but get one that works for you  a  L'Oreal Revitalift 10% Pure Vitamin C   b  Cerave Skin Renewing Vitamin C Serum   c  Kiehl's Powerful Strength Line-Reducing Concentrate with Vitamin C  d  Brianda's Choice C15 Super Booster   e  SkinCeuticals Phloretin CF     3  Apply sunscreen with zinc or titanium dioxide (check the ingredient list)  Some examples are listed below but get one that works for you and that you will use daily  It is ok to use a different sunscreen for the body if you have on already  a  La-Roche Posay Antihelios 50 Mineral    b  Elta MD UV Clear Broad Spectrum SPF 55  c  ISDIN Eryfotona Ageless Broad Spectrum SPF 50  d  Maria Elena Notice Beauty The Beet Shield   e  Unsun Mineral Tinted Broad Spectrum Face Sunscreen   f  Skinceuticals Physical Matte UV Defense SPF 50  g  First Aid [employer] Sunscreen with Zinc Oxide   h  Colorscience Sunforgettable Total Protection Face Shield SPF 48  i  Beautycounter Countersun Mineral Sunscreen Lotion       4  Follow with make-up as needed       Night    1  Wash your face with a gentle cleanser  2  Apply a pea-sized amount of retinol to face and neck  This can be drying so start by using it every other night and slowly build it up to daily use  It is ok to apply moisturizer on top of it as needed  Some of the strongest ones available over the counter are Differin gel and La-Roche Posay Adapalene gel  Prescription strength retinols run about $40-60 for a 3 month supply  This cost is not covered by insurance  Please let us know if you want a prescription           Scribe Attestation    I,:  Garrett Ortiz RN am acting as a scribe while in the presence of the attending physician :       I,:  Cresencio Wiggins MD personally performed the services described in this documentation    as scribed in my presence :

## 2021-03-26 NOTE — PATIENT INSTRUCTIONS
HISTORY OF BASAL CELL CARCINOMA    Assessment and Plan:  Based on a thorough discussion of this condition and the management approach to it (including a comprehensive discussion of the known risks, side effects and potential benefits of treatment), the patient (family) agrees to implement the following specific plan:   Skin checks every 2 years    Instructed to look out for any changing spots     How can basal cell carcinoma be prevented? The most important way to prevent BCC is to avoid sunburn  This is especially important in childhood and early life  Fair skinned individuals and those with a personal or family history of BCC should protect their skin from sun exposure daily, year-round and lifelong   Stay indoors or under the shade in the middle of the day    Wear covering clothing    Apply high protection factor SPF50+ broad-spectrum sunscreens generously to exposed skin if outdoors    Avoid indoor tanning (sun beds, solaria)   Oral nicotinamide (vitamin B3) in a dose of 500 mg twice daily may reduce the number and severity of BCCs  What is the outlook for basal cell carcinoma? Most BCCs are cured by treatment  Cure is most likely if treatment is undertaken when the lesion is small  About 50% of people with BCC develop a second one within 3 years of the first  They are also at increased risk of other skin cancers, especially melanoma  Regular self-skin examinations and long-term annual skin checks by an experienced health professional are recommended      VERRUCA VULGARIS ("Common Warts")    Assessment and Plan:  Based on a thorough discussion of this condition and the management approach to it (including a comprehensive discussion of the known risks, side effects and potential benefits of treatment), the patient (family) agrees to implement the following specific plan:   Cryotherapy done in office today    Patient instructed to use Compound W on the areas  o Place duct tape over the areas with Compound W    Verruca Vulgaris  A verruca is a common growth of the skin caused by infection by human papilloma virus (HPV)  There are many strains of the virus that cause different types of warts on the body  The virus infects the most superficial layers of the skin, causing increased production of skin cells and thickening  Warts can be spread through direct contact with infected skin and may spread to other parts of the body if scratched or picked  A verruca is more commonly called a "wart " Warts are particularly common in school-aged children but can arise at any age  Patients who have a history of eczema are especially prone due to impaired skin barrier  Those taking immunosuppressive drugs or with HIV infections may experience prolonged symptoms despite treatment  Warts generally have a rough surface with a tiny black dot sometimes observed in the middle of each scaly spot  They can range in size from a small bump to large scaly growths  Common warts are often found on the backs of fingers or toes, around the nails, and on the knees  Plantar warts can grow inwardly on the soles of the feet causing pain  There are many possible ways to treat warts and sometimes several different treatments are needed to get the warts to go away completely  There is no single perfect treatment for warts, and successful treatment can take many months  In-office treatments usually require multiple visits, and include:  1) Cryotherapy  a cold spray with liquid nitrogen will destroy the infected cells but may lead to discomfort and blistering  It may also leave a permanent white alexia or scar  2) Electrosurgery (curettage and cautery) can be used for large resistant warts which involves shaving the growth down and burning the base  It is performed under local anesthesia and may leave a permanent scar    3) Candida (yeast) antigen injections   These are extracts of the common yeast (Candida) that cannot cause an infection  The medication is injected into/under the wart  It is thought to stimulate the immune system to recognize the wart virus and attack it  Multiple injections are often needed about one month apart  There are also several at-home wart treatments:    1) Soak the warts in warm water for 5 minutes every night followed by gentle filing with a nail file or pumice stone  2) Topical salicylic acid or similar compounds work by removing the dead surface skin cells  a  Apply the medicine directly to the wart, wait for it to dry completely, then cover with duct tape overnight   b  Repeat until the wart is gone, which can take 2-4 months  c  Do not use on the face or groin area   d  If the wart paint makes the skin sore, stop treatment until the discomfort has settled, then recommence as above   e  Take care to keep the chemical off normal skin  3) Podophyllin is a cytotoxic agent used in some products and must not be used in pregnancy or women considering pregnancy  4) Some prescription medications include   a  Topical retinoids (adapalene, tretinoin, tazarotene), 5-fluorouracil (Efudex) or imiquimod (Aldara) creams are sometimes used to treat flat warts or warts on the face and other sensitive anatomical areas  They are usually applied directly to the warts once a day for 2-4 months and can be irritating  These treatments should only be used as directed by your health care provider  b  Systemic treatment with oral cimetidine (Tagamet) may help boost the immune system against the wart virus in patients, some of the time  Initiation of cimetidine therapy should ONLY be done under the supervision of your health care provider, who can discuss possible side effects and drug-to-drug interactions of this specific treatment         PROCEDURE:  DESTRUCTION OF BENIGN LESIONS: Cryotherapy   After a thorough discussion of treatment options and risk/benefits/alternatives (including but not limited to local pain, scarring, dyspigmentation, blistering, and possible superinfection), verbal and written consent were obtained and the aforementioned lesions were treated on with cryotherapy using liquid nitrogen x 1 cycle for 5-10 seconds   TOTAL NUMBER of 1 lesions were treated today on the ANATOMIC LOCATION: right index finger     The patient tolerated the procedure well, and after-care instructions were provided  PROCEDURE:  INTRALESIONAL JOYCE ANTIGEN    Purpose: Candida antigen is used "off label" as an immunotherapy agent in the treatment of warts  This is widely used technique endorsed by many pediatric dermatologists because of its utility in treating multiple lesions with minimal pain and discomfort and resulting sequelae to the treated areas  Indications: It is used "off label" for the treatment of warts  Potential Side Effects: The patient signifies understanding that Candida antigen injection can potentially cause early and/or delayed adverse effects such as:    Pain    Local immune response    Bleeding    Skin discoloration   Swelling    Flu-like illness with increased lymphnodes   Serious allergic reaction (anaphylaxis)    PROCEDURE NOTE:  After verbal and written consent were obtained, the to-be-treated area was wiped and cleaned with rubbing alcohol 70%  Then, a total of 0 3 mL of refrigerated Candida antigen (Lot# ; Expiration 2022-April- 16, NDC#: 69833-431-32) was injected intralesionally into a total of 2 lesion/s on the following anatomic areas:  Left foot using a 1-mL tuberculin syringe and a 30-gauge needle  There was less than 1 mL of blood loss and little to no discomfort  The area was bandaged with a Band-aid  The patient tolerated the procedure well and remained in the office for observation  With no signs of an adverse reaction, the patient was eventually discharged from clinic        SEBORRHEIC KERATOSIS; NON-INFLAMED      Assessment and Plan:  Based on a thorough discussion of this condition and the management approach to it (including a comprehensive discussion of the known risks, side effects and potential benefits of treatment), the patient (family) agrees to implement the following specific plan:   Benign, no treatment necessary    Patient instructed to start using Amlactin cream over the counter     Seborrheic Keratosis  A seborrheic keratosis is a harmless warty spot that appears during adult life as a common sign of skin aging  Seborrheic keratoses can arise on any area of skin, covered or uncovered, with the usual exception of the palms and soles  They do not arise from mucous membranes  Seborrheic keratoses can have highly variable appearance  Seborrheic keratoses are extremely common  It has been estimated that over 90% of adults over the age of 61 years have one or more of them  They occur in males and females of all races, typically beginning to erupt in the 35s or 45s  They are uncommon under the age of 21 years  The precise cause of seborrhoeic keratoses is not known  Seborrhoeic keratoses are considered degenerative in nature  As time goes by, seborrheic keratoses tend to become more numerous  Some people inherit a tendency to develop a very large number of them; some people may have hundreds of them  The name "seborrheic keratosis" is misleading, because these lesions are not limited to a seborrhoeic distribution (scalp, mid-face, chest, upper back), nor are they formed from sebaceous glands, nor are they associated with sebum -- which is greasy    Seborrheic keratosis may also be called "SK," "Seb K," "basal cell papilloma," "senile wart," or "barnacle "      Researchers have noted:   Eruptive seborrhoeic keratoses can follow sunburn or dermatitis   Skin friction may be the reason they appear in body folds   Viral cause (e g , human papillomavirus) seems unlikely   Stable and clonal mutations or activation of FRFR3, PIK3CA, KENNY, AKT1 and EGFR genes are found in seborrhoeic keratoses   Seborrhoeic keratosis can arise from solar lentigo   FRFR3 mutations also arise in solar lentigines  These mutations are associated with increased age and location on the head and neck, suggesting a role of ultraviolet radiation in these lesions   Seborrheic keratoses do not harbour tumour suppressor gene mutations   Epidermal growth factor receptor inhibitors, which are used to treat some cancers, often result in an increase in verrucal (warty) keratoses  There is no easy way to remove multiple lesions on a single occasion  Unless a specific lesion is "inflamed" and is causing pain or stinging/burning or is bleeding, most insurance companies do not authorize treatment  GARZON ANGIOMAS      Assessment and Plan:  Based on a thorough discussion of this condition and the management approach to it (including a comprehensive discussion of the known risks, side effects and potential benefits of treatment), the patient (family) agrees to implement the following specific plan:   Benign, no treatment necessary     Assessment and Plan:    Cherry angioma, also known as Isa Burn spots, are benign vascular skin lesions  A "cherry angioma" is a firm red, blue or purple papule, 0 1-1 cm in diameter  When thrombosed, they can appear black in colour until evaluated with a dermatoscope when the red or purple colour is more easily seen  Cherry angioma may develop on any part of the body but most often appear on the scalp, face, lips and trunk  An angioma is due to proliferating endothelial cells; these are the cells that line the inside of a blood vessel  Angiomas can arise in early life or later in life; the most common type of angioma is a cherry angioma  Cherry angiomas are very common in males and females of any age or race  They are more noticeable in white skin than in skin of colour  They markedly increase in number from about the age of 36  There may be a family history of similar lesions  Eruptive cherry angiomas have been rarely reported to be associated with internal malignancy  The cause of angiomas is unknown  Genetic analysis of cherry angiomas has shown that they frequently carry specific somatic missense mutations in the GNAQ and GNA11 (Q209H) genes, which are involved in other vascular and melanocytic proliferations  Cherry angioma is usually diagnosed clinically and no investigations are necessary for the majority of lesions  It has a characteristic red-clod or lobular pattern on dermatoscopy (called lacunar pattern using conventional pattern analysis)  When there is uncertainty about the diagnosis, a biopsy may be performed  The angioma is composed of venules in a thickened papillary dermis  Collagen bundles may be prominent between the lobules  Cherry angiomas are harmless, so they do not usually have to be treated  Occasionally, they are removed to exclude a malignant skin lesion such as a nodular melanoma or because they are irritated or bleeding (and a subsequent risk for infection)  To decrease friction over the lesions, we recommend Neutrogena Daily Defense SPF 50+ at least 3 times a day  ACROCHORDON ("SKIN TAG")    Assessment and Plan:  Based on a thorough discussion of this condition and the management approach to it (including a comprehensive discussion of the known risks, side effects and potential benefits of treatment), the patient (family) agrees to implement the following specific plan:   Discussed can be removed through a cosmetic visit: $150 00 per 10 spots     Skin tags are common, soft, harmless skin lesions that are also called, in the appropriate settings, papillomas, fibroepithelial polyps, and soft fibromas  They are made up of loosely arranged collagen fibers and blood vessels surrounded by a thickened or thinned-out epidermis  Skin tags tend to develop in both men and women as we grow older    They are usually found on the skin folds (neck, armpits, groin)  It is not known what specifically causes skin tags  Certain factors, though, do appear to play a role:   Chaffing and irritation from skin rubbing together   High levels of growth factors (as seen, for example, in pregnancy or in acromegaly/gigantism)   Insulin resistance   Human papillomavirus (wart virus)    We discussed that most skin tags do not need to be treated unless they are specifically causing the patient physical distress or limitation or pose a risk for a larger problem such as an infection that forms secondary to excoriation or chronic irritation  We had a thorough discussion of treatment options and specific risks (including that any procedural treatment may not be covered by insurance and would then be the patient's responsibility) and benefits/alternatives including but not limited to the following:   Cryotherapy (freezing)   Shave removal   Surgical excision (snip excision with scissors)   Electrosurgery   Ligation (we do not do this procedure and counseled against it due to risk of tissue necrosis and infection)        DERMATOFIBROMA    Assessment and Plan:  Based on a thorough discussion of this condition and the management approach to it (including a comprehensive discussion of the known risks, side effects and potential benefits of treatment), the patient (family) agrees to implement the following specific plan:    Pt reassured of benign diagnosis, no treatment required  Discussed liquid nitrogen and IL kenalog if desired to help flatten lesion/s      Pt aware that if lesions ever have concerning changes such as rapid enlargement, start to become non healing areas or frequently traumatized, please call to let us know as these changes may require biopsy or we can discuss more definitve removal     DERMATOFIBROMA, PATIENT INFORMATION:  A dermatofibroma is a common benign fibrous nodule that most often arises on the skin of the lower legs  A dermatofibroma is also called a "cutaneous fibrous histiocytoma "  Dermatofibromas occur at all ages and in people of every ethnicity  They are more common in women than in men  It is not clear if dermatofibroma is a reactive process or if it is a neoplasm  The lesions are made up of proliferating fibroblasts  Histiocytes may also be involved  They are sometimes attributed to an insect bite or ingrownhair or local trauma, but not consistently  They may be more numerous in patients with altered immunity  Dermatofibromas most often occur on the legs and arms, but may also arise on the trunk or any site of the body  Typical clinical features include the following:   People may have 1 or up to 15 lesions   Size varies from 0 5-1 5 cm diameter; most lesions are 7-10 mm diameter   They are firm nodules tethered to the skin surface and mobile over subcutaneous tissue   The skin "dimples" on pinching the lesion   Color may be pink to light brown in white skin, and dark brown to black in dark skin; some appear paler in the center   They do not usually cause symptoms, but they are sometimes painful or itchy   Because they are often raised lesions, they may be traumatized, for example by a razor   Occasionally dozens may erupt within a few months, usually in the setting of immunosuppression (for example autoimmune disease, cancer or certain medications)   Dermatofibroma does not give rise to cancer  However, occasionally, it may be mistaken for dermatofibrosarcoma or desmoplastic melanoma  A dermatofibroma is harmless and seldom causes any symptoms  Usually, only reassurance is needed  If it is nuisance or causing concern, the lesion can be removed surgically, resulting in a scar that is, by definition, usually longer in diameter than the widest portion of the dermatofibroma  Cryotherapy, shave biopsy and laser surgery are rarely completely successful    Skin punch biopsy or incisional biopsy may be undertaken if there is an atypical feature such as recent enlargement, ulceration, or asymmetrical structures and colours on dermatoscopy  MILIA, Sebaceous hyperplasia     Assessment and Plan:  Based on a thorough discussion of this condition and the management approach to it (including a comprehensive discussion of the known risks, side effects and potential benefits of treatment), the patient (family) agrees to implement the following specific plan:   Discussed laser treatments or extraction of the areas    Patient instructed to start using a Vitamin A cream (Adaplalene) over the counter     Assessment and Plan  A milium is a small cyst containing keratin (the skin protein); they are usually multiple and are then known as milia  These harmless cysts present as tiny pearly-white bumps just under the surface of the skin  Milia are common in all ages and both sexes  They most often arise on the face and are particularly prominent on the eyelids and cheeks, but they may occur elsewhere  There are various kinds of milia    milia: Affect 40-50% of  babies, few to numerous lesions, often seen on the nose, but may also arise inside the mouth on the mucosa (Daly pearls) or palate (Suleiman nodules) or more widely on the scalp, face and upper trunk, Heal spontaneously within a few weeks of birth   Primary milia in children and adults: found around eyelids, cheeks, forehead and genitalia, in young children, a row of milia may appear along the nasal crease, may clear in a few weeks or persist for months or longer   Juvenile milia: associated with Rombo syndrome, basal cell naevus syndrome, Edzkq-Iidpt-Brnabgfx syndrome, pachyonychia congenita, Oneal syndrome and other genetic disorders, may be congenital (present at birth) or appear later in life     Milia en plaque: multiple milia appear on within an inflamed plaque up to several centimeters in diameter, usually found on an eyelid, behind the ear, on a cheek or jaw  3  Affect children and adults, especially middle-aged women  4  Sometimes associated with another skin disease including pseudoxanthoma elasticum, discoid lupus erythematosus, lichen planus   Multiple eruptive milia: crops of numerous milia appear over a few weeks to months, lesions may be asymptomatic or itchy, most often affect the face, upper arms and upper trunk   Traumatic milia: occur at the site of injury as skin heals, arise from eccrine sweat ducts, examples include thermal burns, dermabrasion, blistering rashes such as bullous pemphigoid, often seen on the back of hands and fingers in porphyria cutanea tarda, a milia-like calcified nodule may develop after  heel stick blood test     Milia associated with drugs: may rarely follow the use of topical medication, such as phenols, hydroquinone, 5-fluorouracil cream, and a corticosteroid  Milia have a characteristic appearance  However, on occasion, a skin biopsy may be performed  This shows a small epidermoid cyst coming from a vellus hair follicle  Milia should be distinguished from other types of cyst, comedones, xanthelasma and syringomas  Colloid milia are smith coloured bumps on cheeks and temples associated with excessive exposure to sunlight  They should also be distinguished from milia-like cysts noted on dermoscopy in seborrhoeic keratoses, papillomatous moles and some basal cell carcinomas  Milia do not need to be treated unless they are a cause for concern for the patient  They often clear up by themselves within a few months  Where possible, further trauma should be minimised to reduce the development of new lesions   The lesion may be de-roofed using a sterile needle or blade and the contents squeezed or pricked out   They may be destroyed using diathermy and curettage, or cryotherapy   For widespread lesions, topical retinoids may be helpful     Chemical peels, dermabrasion and laser ablation have been reported to be effective when used for very extensive milia   Milia en plaque may improve with minocycline (a tetracycline antibiotic)  SKIN CARE RECOMMENDATIONS     Morning    1  Wash your face with a gentle cleanser  Some examples are listed below but get one that works for you  Avoid ones with harsh scrubbing beads   a  Cerave hydrating facial cleanser  b  LaRoche-Posay hydrating gentle cleanser  c  Neutrogena ultra gentle hydrating cleanser     2  Apply Vitamin C  Look for "Ascorbic acid" as an ingredient  This is the most effective form of Vitamin C  Some examples are listed below but get one that works for you  a  L'Oreal Revitalift 10% Pure Vitamin C   b  Cerave Skin Renewing Vitamin C Serum   c  Kiehl's Powerful Strength Line-Reducing Concentrate with Vitamin C  d  Brianda's Choice C15 Super Booster   e  SkinCeuticals Phloretin CF     3  Apply sunscreen with zinc or titanium dioxide (check the ingredient list)  Some examples are listed below but get one that works for you and that you will use daily  It is ok to use a different sunscreen for the body if you have on already  a  La-Roche Posay Antihelios 50 Mineral    b  Elta MD UV Clear Broad Spectrum SPF 55  c  ISDIN Eryfotona Ageless Broad Spectrum SPF 50  d  Teri Piper Beauty The Beet Shield   e  Unsun Mineral Tinted Broad Spectrum Face Sunscreen   f  Skinceuticals Physical Matte UV Defense SPF 50  g  First Aid [employer] Sunscreen with Zinc Oxide   h  Colorscience Sunforgettable Total Protection Face Shield SPF 48  i  Beautycounter Countersun Mineral Sunscreen Lotion       4  Follow with make-up as needed       Night    1  Wash your face with a gentle cleanser  2  Apply a pea-sized amount of retinol to face and neck  This can be drying so start by using it every other night and slowly build it up to daily use  It is ok to apply moisturizer on top of it as needed   Some of the strongest ones available over the counter are Differin gel and La-Roche Posay Adapalene gel  Prescription strength retinols run about $40-60 for a 3 month supply  This cost is not covered by insurance  Please let us know if you want a prescription

## 2021-04-01 ENCOUNTER — OFFICE VISIT (OUTPATIENT)
Dept: FAMILY MEDICINE CLINIC | Facility: CLINIC | Age: 56
End: 2021-04-01
Payer: COMMERCIAL

## 2021-04-01 VITALS
DIASTOLIC BLOOD PRESSURE: 90 MMHG | HEIGHT: 64 IN | OXYGEN SATURATION: 98 % | HEART RATE: 89 BPM | BODY MASS INDEX: 42.17 KG/M2 | TEMPERATURE: 99 F | SYSTOLIC BLOOD PRESSURE: 120 MMHG | RESPIRATION RATE: 18 BRPM | WEIGHT: 247 LBS

## 2021-04-01 DIAGNOSIS — E55.9 VITAMIN D DEFICIENCY: Primary | ICD-10-CM

## 2021-04-01 DIAGNOSIS — E03.9 ACQUIRED HYPOTHYROIDISM: ICD-10-CM

## 2021-04-01 DIAGNOSIS — E78.2 MIXED HYPERLIPIDEMIA: ICD-10-CM

## 2021-04-01 PROCEDURE — 3008F BODY MASS INDEX DOCD: CPT | Performed by: STUDENT IN AN ORGANIZED HEALTH CARE EDUCATION/TRAINING PROGRAM

## 2021-04-01 PROCEDURE — 99213 OFFICE O/P EST LOW 20 MIN: CPT | Performed by: NURSE PRACTITIONER

## 2021-04-01 RX ORDER — ATORVASTATIN CALCIUM 20 MG/1
20 TABLET, FILM COATED ORAL DAILY
Qty: 30 TABLET | Refills: 2 | Status: SHIPPED | OUTPATIENT
Start: 2021-04-01 | End: 2021-06-28 | Stop reason: SDUPTHER

## 2021-04-01 RX ORDER — ERGOCALCIFEROL 1.25 MG/1
50000 CAPSULE ORAL WEEKLY
Qty: 8 CAPSULE | Refills: 0 | Status: SHIPPED | OUTPATIENT
Start: 2021-04-01 | End: 2021-06-02

## 2021-04-01 RX ORDER — LEVOTHYROXINE SODIUM 0.07 MG/1
75 TABLET ORAL DAILY
Qty: 30 TABLET | Refills: 2 | Status: SHIPPED | OUTPATIENT
Start: 2021-04-01 | End: 2021-06-28 | Stop reason: SDUPTHER

## 2021-04-01 NOTE — PATIENT INSTRUCTIONS
1  Dosage change levothyroxine to 75 mcg daily and repeat TSH in 6 -8 weeks  2  Vit D weekly for 8 weeks and repeat levels  3  Heart Healthy diety and exercise  4   Atorvastatin 20 mg PO daily

## 2021-04-01 NOTE — PROGRESS NOTES
Assessment/Plan   Problem List Items Addressed This Visit        Other    Hyperlipidemia    Relevant Medications    atorvastatin (LIPITOR) 20 mg tablet    Vitamin D deficiency - Primary    Relevant Medications    ergocalciferol (VITAMIN D2) 50,000 units    Other Relevant Orders    Vitamin D 25 hydroxy      Other Visit Diagnoses     Acquired hypothyroidism        Relevant Medications    levothyroxine 75 mcg tablet    Other Relevant Orders    TSH, 3rd generation with Free T4 reflex                Chief Complaint   Patient presents with    Follow-up     lab results        Subjective   Patient ID: Titus Obrien is a 55 y o  female  Vitals:    04/01/21 1611   BP: 120/90   Pulse: 89   Resp: 18   Temp: 99 °F (37 2 °C)   SpO2: 98%     King Ida is here today to review her labs,    Hemoglobin A1c 5 08 - discussed  Decreasing the amount of sugar to intake in her diet, discussed heart healthy eating, and regular exercise  Total cholesterol was 267 triglycerides were 309 HDL 50 , patient finally agrees that it is time to begin a statin to help reduce her cholesterol  We will begin atorvastatin 20 mg p o  daily she will also add heart healthy diet and attempt exercise and we will re-evaluate her cholesterol in 6 months  Vitamin-D level was 24 3 will do [place] vitamin [place] 66553 units weekly for 8 weeks and re-evaluate vitamin-D level  TSH was 4 660-slightly elevated-will increase levothyroxine from 50 mcg to 75 mcg p o  daily and recheck this level also in 68 weeks      The following portions of the patient's history were reviewed and updated as appropriate: allergies, current medications, past family history, past social history, past surgical history and problem list     Review of Systems   Constitutional: Negative  HENT: Negative  Eyes: Negative  Respiratory: Negative  Cardiovascular: Negative  Gastrointestinal: Negative  Endocrine: Negative  Genitourinary: Negative  Musculoskeletal: Positive for arthralgias  Skin: Negative  Allergic/Immunologic: Negative  Neurological: Negative  Hematological: Negative  Psychiatric/Behavioral: Negative  Objective     Physical Exam  Vitals signs and nursing note reviewed  Constitutional:       General: She is not in acute distress  Appearance: Normal appearance  She is not ill-appearing  HENT:      Head: Normocephalic and atraumatic  Nose: No congestion  Mouth/Throat:      Mouth: Mucous membranes are moist    Eyes:      Extraocular Movements: Extraocular movements intact  Conjunctiva/sclera: Conjunctivae normal    Neck:      Musculoskeletal: Normal range of motion  Cardiovascular:      Rate and Rhythm: Normal rate  Pulses: Normal pulses  Pulmonary:      Effort: Pulmonary effort is normal       Breath sounds: Normal breath sounds  Musculoskeletal: Normal range of motion  Skin:     General: Skin is warm and dry  Capillary Refill: Capillary refill takes less than 2 seconds  Neurological:      Mental Status: She is alert and oriented to person, place, and time  Psychiatric:         Mood and Affect: Mood normal          Behavior: Behavior normal          Thought Content:  Thought content normal          Judgment: Judgment normal

## 2021-04-06 DIAGNOSIS — Z23 ENCOUNTER FOR IMMUNIZATION: ICD-10-CM

## 2021-04-14 ENCOUNTER — IMMUNIZATIONS (OUTPATIENT)
Dept: FAMILY MEDICINE CLINIC | Facility: HOSPITAL | Age: 56
End: 2021-04-14

## 2021-04-14 DIAGNOSIS — Z23 ENCOUNTER FOR IMMUNIZATION: Primary | ICD-10-CM

## 2021-04-14 PROCEDURE — 91300 SARS-COV-2 / COVID-19 MRNA VACCINE (PFIZER-BIONTECH) 30 MCG: CPT

## 2021-04-14 PROCEDURE — 0001A SARS-COV-2 / COVID-19 MRNA VACCINE (PFIZER-BIONTECH) 30 MCG: CPT

## 2021-04-22 DIAGNOSIS — E55.9 VITAMIN D DEFICIENCY: ICD-10-CM

## 2021-04-22 RX ORDER — ERGOCALCIFEROL 1.25 MG/1
CAPSULE ORAL
Qty: 4 CAPSULE | Refills: 1 | OUTPATIENT
Start: 2021-04-22

## 2021-05-09 ENCOUNTER — IMMUNIZATIONS (OUTPATIENT)
Dept: FAMILY MEDICINE CLINIC | Facility: HOSPITAL | Age: 56
End: 2021-05-09

## 2021-05-09 DIAGNOSIS — Z23 ENCOUNTER FOR IMMUNIZATION: Primary | ICD-10-CM

## 2021-05-09 PROCEDURE — 91300 SARS-COV-2 / COVID-19 MRNA VACCINE (PFIZER-BIONTECH) 30 MCG: CPT

## 2021-05-09 PROCEDURE — 0002A SARS-COV-2 / COVID-19 MRNA VACCINE (PFIZER-BIONTECH) 30 MCG: CPT

## 2021-05-21 DIAGNOSIS — E55.9 VITAMIN D DEFICIENCY: ICD-10-CM

## 2021-05-21 RX ORDER — ERGOCALCIFEROL 1.25 MG/1
CAPSULE ORAL
Qty: 4 CAPSULE | Refills: 1 | OUTPATIENT
Start: 2021-05-21

## 2021-06-02 ENCOUNTER — HOSPITAL ENCOUNTER (EMERGENCY)
Facility: HOSPITAL | Age: 56
Discharge: HOME | End: 2021-06-02
Attending: EMERGENCY MEDICINE | Admitting: EMERGENCY MEDICINE
Payer: COMMERCIAL

## 2021-06-02 ENCOUNTER — APPOINTMENT (EMERGENCY)
Dept: RADIOLOGY | Facility: HOSPITAL | Age: 56
End: 2021-06-02
Attending: EMERGENCY MEDICINE
Payer: COMMERCIAL

## 2021-06-02 VITALS
OXYGEN SATURATION: 95 % | HEIGHT: 64 IN | HEART RATE: 97 BPM | DIASTOLIC BLOOD PRESSURE: 87 MMHG | RESPIRATION RATE: 20 BRPM | WEIGHT: 250 LBS | TEMPERATURE: 98.1 F | SYSTOLIC BLOOD PRESSURE: 130 MMHG | BODY MASS INDEX: 42.68 KG/M2

## 2021-06-02 DIAGNOSIS — R00.2 PALPITATIONS: Primary | ICD-10-CM

## 2021-06-02 LAB
ANION GAP SERPL CALCULATED.3IONS-SCNC: 10 MMOL/L (ref 4–13)
BASOPHILS # BLD AUTO: 0.03 THOUSANDS/ΜL (ref 0–0.1)
BASOPHILS NFR BLD AUTO: 0 % (ref 0–1)
BUN SERPL-MCNC: 15 MG/DL (ref 5–25)
CALCIUM SERPL-MCNC: 9.7 MG/DL (ref 8.3–10.1)
CHLORIDE SERPL-SCNC: 104 MMOL/L (ref 100–108)
CO2 SERPL-SCNC: 26 MMOL/L (ref 21–32)
CREAT SERPL-MCNC: 0.84 MG/DL (ref 0.6–1.3)
D DIMER PPP FEU-MCNC: 0.29 UG/ML FEU
EOSINOPHIL # BLD AUTO: 0.28 THOUSAND/ΜL (ref 0–0.61)
EOSINOPHIL NFR BLD AUTO: 2 % (ref 0–6)
ERYTHROCYTE [DISTWIDTH] IN BLOOD BY AUTOMATED COUNT: 12.5 % (ref 11.6–15.1)
GFR SERPL CREATININE-BSD FRML MDRD: 78 ML/MIN/1.73SQ M
GLUCOSE SERPL-MCNC: 114 MG/DL (ref 65–140)
HCT VFR BLD AUTO: 42 % (ref 34.8–46.1)
HGB BLD-MCNC: 13.7 G/DL (ref 11.5–15.4)
IMM GRANULOCYTES # BLD AUTO: 0.04 THOUSAND/UL (ref 0–0.2)
IMM GRANULOCYTES NFR BLD AUTO: 0 % (ref 0–2)
LYMPHOCYTES # BLD AUTO: 1.64 THOUSANDS/ΜL (ref 0.6–4.47)
LYMPHOCYTES NFR BLD AUTO: 13 % (ref 14–44)
MCH RBC QN AUTO: 29.2 PG (ref 26.8–34.3)
MCHC RBC AUTO-ENTMCNC: 32.6 G/DL (ref 31.4–37.4)
MCV RBC AUTO: 90 FL (ref 82–98)
MONOCYTES # BLD AUTO: 0.97 THOUSAND/ΜL (ref 0.17–1.22)
MONOCYTES NFR BLD AUTO: 8 % (ref 4–12)
NEUTROPHILS # BLD AUTO: 9.3 THOUSANDS/ΜL (ref 1.85–7.62)
NEUTS SEG NFR BLD AUTO: 77 % (ref 43–75)
NRBC BLD AUTO-RTO: 0 /100 WBCS
PLATELET # BLD AUTO: 271 THOUSANDS/UL (ref 149–390)
PMV BLD AUTO: 10.1 FL (ref 8.9–12.7)
POTASSIUM SERPL-SCNC: 4.2 MMOL/L (ref 3.5–5.3)
RBC # BLD AUTO: 4.69 MILLION/UL (ref 3.81–5.12)
SODIUM SERPL-SCNC: 140 MMOL/L (ref 136–145)
TROPONIN I SERPL-MCNC: <0.02 NG/ML
TSH SERPL DL<=0.05 MIU/L-ACNC: 2.03 UIU/ML (ref 0.36–3.74)
WBC # BLD AUTO: 12.26 THOUSAND/UL (ref 4.31–10.16)

## 2021-06-02 PROCEDURE — 99285 EMERGENCY DEPT VISIT HI MDM: CPT | Performed by: EMERGENCY MEDICINE

## 2021-06-02 PROCEDURE — 96360 HYDRATION IV INFUSION INIT: CPT

## 2021-06-02 PROCEDURE — 85025 COMPLETE CBC W/AUTO DIFF WBC: CPT | Performed by: EMERGENCY MEDICINE

## 2021-06-02 PROCEDURE — 80048 BASIC METABOLIC PNL TOTAL CA: CPT | Performed by: EMERGENCY MEDICINE

## 2021-06-02 PROCEDURE — 85379 FIBRIN DEGRADATION QUANT: CPT | Performed by: EMERGENCY MEDICINE

## 2021-06-02 PROCEDURE — 99285 EMERGENCY DEPT VISIT HI MDM: CPT

## 2021-06-02 PROCEDURE — 84443 ASSAY THYROID STIM HORMONE: CPT | Performed by: EMERGENCY MEDICINE

## 2021-06-02 PROCEDURE — 36415 COLL VENOUS BLD VENIPUNCTURE: CPT | Performed by: EMERGENCY MEDICINE

## 2021-06-02 PROCEDURE — 84484 ASSAY OF TROPONIN QUANT: CPT | Performed by: EMERGENCY MEDICINE

## 2021-06-02 PROCEDURE — 71045 X-RAY EXAM CHEST 1 VIEW: CPT

## 2021-06-02 RX ADMIN — SODIUM CHLORIDE 1000 ML: 0.9 INJECTION, SOLUTION INTRAVENOUS at 07:48

## 2021-06-02 NOTE — ED PROVIDER NOTES
History  Chief Complaint   Patient presents with    Palpitations     patient reports, "It feels like my heart feels like it is beating a million miles a minute " patient denies CP, SOB, v, diaphoresis  admits to some nausea       Palpitations  Palpitations quality:  Fast  Onset quality:  Sudden  Duration:  15 minutes  Timing:  Intermittent  Progression:  Resolved  Chronicity:  New  Context: anxiety    Context: not illicit drugs    Associated symptoms: no chest pain and no vomiting        Prior to Admission Medications   Prescriptions Last Dose Informant Patient Reported? Taking? Multiple Vitamin (MULTIVITAMIN ADULT PO)  Self Yes No   Sig: Take by mouth daily   Probiotic Product (PROBIOTIC PO)  Self Yes No   Sig: Take by mouth daily   atorvastatin (LIPITOR) 20 mg tablet   No No   Sig: Take 1 tablet (20 mg total) by mouth daily   levothyroxine 75 mcg tablet   No No   Sig: Take 1 tablet (75 mcg total) by mouth daily   loratadine (CLARITIN) 10 mg tablet  Self Yes No   Sig: Take 10 mg by mouth daily      Facility-Administered Medications: None       Past Medical History:   Diagnosis Date    Basal cell carcinoma     Per patient , had a BCC on her left upper arm 6 years ago that was removed by the MOHS procedure     Disease of thyroid gland     Herpes zoster     Right, at the T6 dermatome   last assessed: 13    Hypercholesteremia        Past Surgical History:   Procedure Laterality Date     SECTION      CHOLECYSTECTOMY      MOHS SURGERY      Per patient, had the MOHS procedure for a BCC left upper arm 6 years ago    US GUIDED BREAST BIOPSY LEFT COMPLETE Left 2019       Family History   Problem Relation Age of Onset    Diabetes Father     Diabetes Sister     Thyroid disease Sister         Disorder    Autoimmune disease Brother         Disorder    Diabetes Brother     Thyroid disease Brother     Breast cancer Maternal Aunt      I have reviewed and agree with the history as documented  E-Cigarette/Vaping    E-Cigarette Use Never User      E-Cigarette/Vaping Substances    Nicotine No     THC No     CBD No     Flavoring No     Other No     Unknown No      Social History     Tobacco Use    Smoking status: Never Smoker    Smokeless tobacco: Never Used   Substance Use Topics    Alcohol use: No    Drug use: No       Review of Systems   Constitutional: Negative for fatigue and fever  Respiratory: Negative for chest tightness  Cardiovascular: Positive for palpitations  Negative for chest pain  Gastrointestinal: Negative for abdominal pain and vomiting  All other systems reviewed and are negative  Physical Exam  Physical Exam  Vitals signs and nursing note reviewed  Constitutional:       Appearance: She is well-developed  HENT:      Head: Normocephalic and atraumatic  Right Ear: External ear normal       Left Ear: External ear normal       Nose: Nose normal    Eyes:      General: No scleral icterus  Neck:      Musculoskeletal: Normal range of motion  Cardiovascular:      Rate and Rhythm: Normal rate and regular rhythm  Pulmonary:      Effort: Pulmonary effort is normal  No respiratory distress  Abdominal:      General: There is no distension  Tenderness: There is no abdominal tenderness  Musculoskeletal: Normal range of motion  General: No deformity  Skin:     Findings: No rash  Neurological:      General: No focal deficit present  Mental Status: She is alert and oriented to person, place, and time     Psychiatric:         Mood and Affect: Mood normal          Vital Signs  ED Triage Vitals [06/02/21 0739]   Temperature Pulse Respirations Blood Pressure SpO2   98 1 °F (36 7 °C) (!) 106 18 (!) 174/88 99 %      Temp Source Heart Rate Source Patient Position - Orthostatic VS BP Location FiO2 (%)   Temporal Monitor -- -- --      Pain Score       --           Vitals:    06/02/21 0739 06/02/21 0800 06/02/21 0815   BP: (!) 174/88 130/87 130/87   Pulse: (!) 106 98 97         Visual Acuity      ED Medications  Medications   sodium chloride 0 9 % bolus 1,000 mL (0 mL Intravenous Stopped 6/2/21 0832)       Diagnostic Studies  Results Reviewed     Procedure Component Value Units Date/Time    Basic metabolic panel [[id number]] Collected: 06/02/21 0746    Lab Status: Final result Specimen: Blood from Arm, Right Updated: 06/02/21 8743     Sodium 140 mmol/L      Potassium 4 2 mmol/L      Chloride 104 mmol/L      CO2 26 mmol/L      ANION GAP 10 mmol/L      BUN 15 mg/dL      Creatinine 0 84 mg/dL      Glucose 114 mg/dL      Calcium 9 7 mg/dL      eGFR 78 ml/min/1 73sq m     Narrative:      [place] guidelines for Chronic Kidney Disease (CKD):     Stage 1 with normal or high GFR (GFR > 90 mL/min/1 73 square meters)    Stage 2 Mild CKD (GFR = 60-89 mL/min/1 73 square meters)    Stage 3A Moderate CKD (GFR = 45-59 mL/min/1 73 square meters)    Stage 3B Moderate CKD (GFR = 30-44 mL/min/1 73 square meters)    Stage 4 Severe CKD (GFR = 15-29 mL/min/1 73 square meters)    Stage 5 End Stage CKD (GFR <15 mL/min/1 73 square meters)  Note: GFR calculation is accurate only with a steady state creatinine    TSH, 3rd generation with Free T4 reflex [[id number]]  (Normal) Collected: 06/02/21 0746    Lab Status: Final result Specimen: Blood from Arm, Right Updated: 06/02/21 0821     TSH 3RD GENERATON 2 029 uIU/mL     Narrative:      Patients undergoing fluorescein dye angiography may retain small amounts of fluorescein in the body for 48-72 hours post procedure  Samples containing fluorescein can produce falsely depressed TSH values  If the patient had this procedure,a specimen should be resubmitted post fluorescein clearance        Troponin I [[id number]]  (Normal) Collected: 06/02/21 0746    Lab Status: Final result Specimen: Blood from Arm, Right Updated: 06/02/21 0817     Troponin I <0 02 ng/mL     D-dimer, quantitative [[id number]]  (Normal) Collected: 06/02/21 0746    Lab Status: Final result Specimen: Blood from Arm, Right Updated: 06/02/21 0810     D-Dimer, Quant 0 29 ug/ml FEU     CBC and differential [[id number]]  (Abnormal) Collected: 06/02/21 0746    Lab Status: Final result Specimen: Blood from Arm, Right Updated: 06/02/21 0754     WBC 12 26 Thousand/uL      RBC 4 69 Million/uL      Hemoglobin 13 7 g/dL      Hematocrit 42 0 %      MCV 90 fL      MCH 29 2 pg      MCHC 32 6 g/dL      RDW 12 5 %      MPV 10 1 fL      Platelets 366 Thousands/uL      nRBC 0 /100 WBCs      Neutrophils Relative 77 %      Immat GRANS % 0 %      Lymphocytes Relative 13 %      Monocytes Relative 8 %      Eosinophils Relative 2 %      Basophils Relative 0 %      Neutrophils Absolute 9 30 Thousands/µL      Immature Grans Absolute 0 04 Thousand/uL      Lymphocytes Absolute 1 64 Thousands/µL      Monocytes Absolute 0 97 Thousand/µL      Eosinophils Absolute 0 28 Thousand/µL      Basophils Absolute 0 03 Thousands/µL                  X-ray chest 1 view portable   Final Result by Suzann Babinski, MD (06/02 7944)      No acute cardiopulmonary disease                 Workstation performed: [id number]                    Procedures  Procedures         ED Course  ED Course as of Jun 02 1406   Wed Jun 02, 2021   4778 Procedure Note: EKG  Date/Time: 06/02/21 7:44 AM   Performed by: Manolo Courtney  Authorized by: Manolo Courtney  ECG interpreted by me, the ED Provider: yes   The EKG demonstrates:  Rate 108  Rhythm sinus tach  QTc 444  No ST elevations/depressions                  HEART Risk Score      Most Recent Value   Heart Score Risk Calculator   History  0 Filed at: 06/02/2021 1406   ECG  0 Filed at: 06/02/2021 1406   Age  1 Filed at: 06/02/2021 1406   Risk Factors  1 Filed at: 06/02/2021 1406   Troponin  0 Filed at: 06/02/2021 1406   HEART Score  2 Filed at: 06/02/2021 1406                                    MDM  Number of Diagnoses or Management Options  Palpitations: new and requires workup  Diagnosis management comments: 56 yof palpitations  Labs, CXR  Reassess  Patient asymptomatic at this time  Amount and/or Complexity of Data Reviewed  Clinical lab tests: ordered and reviewed  Tests in the radiology section of CPT®: ordered and reviewed  Review and summarize past medical records: yes        Disposition  Final diagnoses:   Palpitations     Time reflects when diagnosis was documented in both MDM as applicable and the Disposition within this note     Time User Action Codes Description Comment    6/2/2021  8:21 AM Celena Perdomo Add [R00 2] Palpitations       ED Disposition     ED Disposition Condition Date/Time Comment    Discharge Stable Wed Jun 2, 2021  8:21 AM Luna Allan discharge to home/self care  Follow-up Information     Follow up With Specialties Details Why Contact Info Additional Information    Sunil Walter,  Family Medicine   [address]  [address]  [facility] [phone]        [address] Emergency Department Emergency Medicine  If symptoms worsen [address] [address]  [address] [facility], [address]          Discharge Medication List as of 6/2/2021  8:22 AM      CONTINUE these medications which have NOT CHANGED    Details   atorvastatin (LIPITOR) 20 mg tablet Take 1 tablet (20 mg total) by mouth daily, Starting Thu 4/1/2021, Until Sat 5/1/2021, Normal      levothyroxine 75 mcg tablet Take 1 tablet (75 mcg total) by mouth daily, Starting Thu 4/1/2021, Normal      loratadine (CLARITIN) 10 mg tablet Take 10 mg by mouth daily, Historical Med      Multiple Vitamin (MULTIVITAMIN ADULT PO) Take by mouth daily, Historical Med      Probiotic Product (PROBIOTIC PO) Take by mouth daily, Historical Med           No discharge procedures on file      PDMP Review     None          ED Provider  Electronically Signed by           Margarita Irwin,   06/02/21 6992

## 2021-06-28 DIAGNOSIS — E78.2 MIXED HYPERLIPIDEMIA: ICD-10-CM

## 2021-06-28 DIAGNOSIS — E03.9 ACQUIRED HYPOTHYROIDISM: ICD-10-CM

## 2021-06-28 RX ORDER — ATORVASTATIN CALCIUM 20 MG/1
20 TABLET, FILM COATED ORAL DAILY
Qty: 30 TABLET | Refills: 2 | Status: SHIPPED | OUTPATIENT
Start: 2021-06-28 | End: 2021-09-22 | Stop reason: SDUPTHER

## 2021-06-28 RX ORDER — LEVOTHYROXINE SODIUM 0.07 MG/1
75 TABLET ORAL DAILY
Qty: 30 TABLET | Refills: 2 | Status: SHIPPED | OUTPATIENT
Start: 2021-06-28 | End: 2021-09-22 | Stop reason: SDUPTHER

## 2021-07-15 LAB
25(OH)D3+25(OH)D2 SERPL-MCNC: 30.5 NG/ML (ref 30–100)
TSH SERPL DL<=0.005 MIU/L-ACNC: 1.87 UIU/ML (ref 0.45–4.5)

## 2021-09-22 DIAGNOSIS — E78.2 MIXED HYPERLIPIDEMIA: ICD-10-CM

## 2021-09-22 DIAGNOSIS — E03.9 ACQUIRED HYPOTHYROIDISM: ICD-10-CM

## 2021-09-22 RX ORDER — LEVOTHYROXINE SODIUM 0.07 MG/1
TABLET ORAL
Qty: 90 TABLET | OUTPATIENT
Start: 2021-09-22

## 2021-09-22 RX ORDER — ATORVASTATIN CALCIUM 20 MG/1
TABLET, FILM COATED ORAL
Qty: 90 TABLET | OUTPATIENT
Start: 2021-09-22

## 2021-09-22 RX ORDER — LEVOTHYROXINE SODIUM 0.07 MG/1
75 TABLET ORAL DAILY
Qty: 90 TABLET | Refills: 1 | Status: SHIPPED | OUTPATIENT
Start: 2021-09-22 | End: 2021-12-20 | Stop reason: SDUPTHER

## 2021-09-22 RX ORDER — ATORVASTATIN CALCIUM 20 MG/1
20 TABLET, FILM COATED ORAL DAILY
Qty: 90 TABLET | Refills: 1 | Status: SHIPPED | OUTPATIENT
Start: 2021-09-22 | End: 2021-12-20 | Stop reason: SDUPTHER

## 2021-12-20 DIAGNOSIS — E03.9 ACQUIRED HYPOTHYROIDISM: ICD-10-CM

## 2021-12-20 DIAGNOSIS — E78.2 MIXED HYPERLIPIDEMIA: ICD-10-CM

## 2021-12-20 RX ORDER — ATORVASTATIN CALCIUM 20 MG/1
20 TABLET, FILM COATED ORAL DAILY
Qty: 90 TABLET | Refills: 1 | Status: SHIPPED | OUTPATIENT
Start: 2021-12-20 | End: 2022-06-13

## 2021-12-20 RX ORDER — LEVOTHYROXINE SODIUM 0.07 MG/1
75 TABLET ORAL DAILY
Qty: 90 TABLET | Refills: 1 | Status: SHIPPED | OUTPATIENT
Start: 2021-12-20 | End: 2022-06-13

## 2022-02-19 NOTE — TELEPHONE ENCOUNTER
REFERRING PHYSICIAN: David Mascorro MD    DATE:  02/18/2022    PREOPERATIVE DIAGNOSIS:  Left grade 1 open tib-fib fracture.    POSTOPERATIVE DIAGNOSIS:  Left grade 1 open tib-fib fracture.    PROCEDURES PERFORMED:    1. Irrigation and debridement of skin, underlying muscle, and periosteum of left tib-fib fracture.  2. Application of spanning ankle external fixator.  3. Closed reduction of left tib-fib fracture.    ASSISTANT:  Randy.    ANESTHESIA:  General.    COMPLICATIONS:  None.    CONDITION:  Stable.    INDICATION FOR SURGERY:  The patient is a 63-year-old female, who sustained an open grade 1 tib-fib fracture, evaluated in the emergency room, IV antibiotics started.  Washout in the emergency room was performed.  The patient was splinted.  She is here for provisional fixation with an external fixator.  Risks and benefits of surgery were discussed with the patient.  The patient understands those risks and wished to proceed with surgery.    OPERATIVE SUMMARY:  The patient was brought back to the operating room, underwent general anesthesia, placed on well-padded table.  Bony prominences padded well.  Left leg was prepped and draped in usual sterile fashion.  We started by applying the external fixator.  Transfixation pin through the calcaneus was made through a small poke hole laterally.  We then placed 2 bars, 1 medially and 1 laterally.  Got the fracture out to length under AP and lateral fluoroscopic views, confirmed the closed reduction and tightened the external fixator.  She had a small poke hole over the subcutaneous medial border of the tibia.  We extended it proximally and distally, debrided and irrigated it with pulse lavage and then closed it with nylon.  It was a clean wound.      After this was completed, a sterile bulky dressing was applied.  The patient was awoken, extubated, transferred to recovery room in stable condition.        Dictated By:  Guevara Pleitez MD        D:  02/18/2022  Patient called  She was returning your call  [phone]   Thank you  13:48:23  T:  02/19/2022 00:41:05  PFD/modl  Job:  [id number]      cc:  David Mascorro MD

## 2022-03-06 ENCOUNTER — HOSPITAL ENCOUNTER (EMERGENCY)
Facility: HOSPITAL | Age: 57
Discharge: HOME | End: 2022-03-06
Attending: EMERGENCY MEDICINE | Admitting: EMERGENCY MEDICINE
Payer: COMMERCIAL

## 2022-03-06 ENCOUNTER — APPOINTMENT (EMERGENCY)
Dept: RADIOLOGY | Facility: HOSPITAL | Age: 57
End: 2022-03-06
Payer: COMMERCIAL

## 2022-03-06 VITALS
RESPIRATION RATE: 18 BRPM | TEMPERATURE: 98.1 F | HEART RATE: 80 BPM | DIASTOLIC BLOOD PRESSURE: 72 MMHG | SYSTOLIC BLOOD PRESSURE: 162 MMHG

## 2022-03-06 DIAGNOSIS — S90.31XA: ICD-10-CM

## 2022-03-06 DIAGNOSIS — M25.562 BILATERAL KNEE PAIN: ICD-10-CM

## 2022-03-06 DIAGNOSIS — M25.561 BILATERAL KNEE PAIN: ICD-10-CM

## 2022-03-06 DIAGNOSIS — S62.001A NONDISPLACED FRACTURE OF RIGHT SCAPHOID BONE: Primary | ICD-10-CM
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
## 2022-03-06 PROCEDURE — 73110 X-RAY EXAM OF WRIST: CPT

## 2022-03-06 PROCEDURE — 99284 EMERGENCY DEPT VISIT MOD MDM: CPT

## 2022-03-06 PROCEDURE — 99284 EMERGENCY DEPT VISIT MOD MDM: CPT | Performed by: EMERGENCY MEDICINE

## 2022-03-06 RX ORDER — IBUPROFEN 400 MG/1
400 TABLET ORAL ONCE
Status: COMPLETED | OUTPATIENT
Start: 2022-03-06 | End: 2022-03-06

## 2022-03-06 RX ORDER — ACETAMINOPHEN 325 MG/1
975 TABLET ORAL ONCE
Status: COMPLETED | OUTPATIENT
Start: 2022-03-06 | End: 2022-03-06

## 2022-03-06 RX ADMIN — IBUPROFEN 400 MG: 400 TABLET, FILM COATED ORAL at 09:22

## 2022-03-06 RX ADMIN — ACETAMINOPHEN 975 MG: 325 TABLET ORAL at 09:22

## 2022-03-06 NOTE — DISCHARGE INSTRUCTIONS
Wrist Fracture in Adults   WHAT YOU NEED TO KNOW:   A wrist fracture is a break in one or more of the bones in your wrist         DISCHARGE INSTRUCTIONS:   Return to the emergency department if:   · Your pain gets worse or does not get better after you take pain medicine  · Your cast or splint breaks, gets wet, or is damaged  · Your hand or fingers feel numb or cold  · Your hand or fingers turn white or blue  · Your splint or cast feels too tight  · You have more pain or swelling after the cast or splint is put on  Call your doctor if:   · You have a fever  · There is a foul smell or blood coming from under the cast     · You have questions or concerns about your condition or care  Medicines: You may need any of the following:  · Prescription pain medicine  may be given  Ask your healthcare provider how to take this medicine safely  Some prescription pain medicines contain acetaminophen  Do not take other medicines that contain acetaminophen without talking to your healthcare provider  Too much acetaminophen may cause liver damage  Prescription pain medicine may cause constipation  Ask your healthcare provider how to prevent or treat constipation  · NSAIDs , such as ibuprofen, help decrease swelling, pain, and fever  NSAIDs can cause stomach bleeding or kidney problems in certain people  If you take blood thinner medicine, always ask your healthcare provider if NSAIDs are safe for you  Always read the medicine label and follow directions  · Acetaminophen  decreases pain and fever  It is available without a doctor's order  Ask how much to take and how often to take it  Follow directions  Read the labels of all other medicines you are using to see if they also contain acetaminophen, or ask your doctor or pharmacist  Acetaminophen can cause liver damage if not taken correctly  Do not use more than 4 grams (4,000 milligrams) total of acetaminophen in one day       · Take your medicine as directed  Contact your healthcare provider if you think your medicine is not helping or if you have side effects  Tell him or her if you are allergic to any medicine  Keep a list of the medicines, vitamins, and herbs you take  Include the amounts, and when and why you take them  Bring the list or the pill bottles to follow-up visits  Carry your medicine list with you in case of an emergency  Self-care:   · Rest  as much as possible  Do not play contact sports until the healthcare provider says it is okay  · Apply ice  on your wrist for 15 to 20 minutes every hour or as directed  Use an ice pack, or put crushed ice in a plastic bag  Cover it with a towel before you place it on your skin  Ice helps prevent tissue damage and decreases swelling and pain  · Elevate  your wrist above the level of your heart as often as possible  This will help decrease swelling and pain  Prop your wrist on pillows or blankets to keep it elevated comfortably  Cast or splint care:   · You may take a bath or shower as directed  Do not let your cast or splint get wet  Before bathing, cover the cast or splint with 2 plastic trash bags  Tape the bags to your skin above the cast or splint to seal out the water  Keep your arm out of the water in case the bag breaks  If a plaster cast gets wet and soft, call your healthcare provider  · Check the skin around the cast or splint every day  You may put lotion on any red or sore areas  · Do not push down or lean on the cast or brace because it may break  · Do not  scratch the skin under the cast by putting a sharp or pointed object inside the cast     Go to physical therapy as directed: You may need physical therapy after your wrist heals and the cast is removed  A physical therapist can teach you exercises to help improve movement and strength and to decrease pain  Follow up with your doctor or bone specialist as directed:   You may need to return to have your cast removed  You may also need an x-ray to check how well the bone has healed  Write down your questions so you remember to ask them during your visits  © Copyright [employer] 2022 Information is for End User's use only and may not be sold, redistributed or otherwise used for commercial purposes  All illustrations and images included in CareNotes® are the copyrighted property of [employer]  or Ludin Briseno   The above information is an  only  It is not intended as medical advice for individual conditions or treatments  Talk to your doctor, nurse or pharmacist before following any medical regimen to see if it is safe and effective for you

## 2022-03-06 NOTE — ED PROVIDER NOTES
History  Chief Complaint   Patient presents with   Lady Sal     tripped going into a restaurant last night   hit knees and hands on ground     55year old right-handed female presents for evaluation of right wrist pain, bilateral knee pain and pain of the sole of the right foot which began last night around 8 pm when she tripped on a rug in a restaurant causing her to fall forward, striking her right shoulder on a glass wall and landing on her hands and knees on the concrete floor  Patient had taken tylenol for pain last night, but has not taken anything for pain yet today  Despite the pain in her knees and right foot, she has been able to ambulate without difficulty  No head strike or LOC  No recent illness  No anticoagulation or antiplatelets  History provided by:  Patient  Fall  Mechanism of injury: fall    Injury location:  Shoulder/arm, leg and foot  Shoulder/arm injury location:  R wrist  Leg injury location:  L knee and R knee  Foot injury location:  Sole of R foot  Incident location: restaurant  Time since incident:  12 hours  Arrived directly from scene: no    Fall:     Fall occurred:  Tripped    Height of fall:  Ground level    Impact surface: glass wall (did not break), concrete floor  Point of impact: right shoulder, hands and knees  Suspicion of alcohol use: no    Suspicion of drug use: no    Tetanus status:  Up to date (2019)  Prior to arrival data:     Patient ambulatory at scene: yes      Blood loss:  None    Loss of consciousness: no      Amnesic to event: no      Medications administered: tylenol last night  Immobilization:  None  Current pain details:     Pain quality:  Sore    Pain Severity:  Moderate    Pain timing:  Constant  Associated symptoms: no back pain, no chest pain and no headaches    Risk factors: no anticoagulation therapy and no diabetes        Prior to Admission Medications   Prescriptions Last Dose Informant Patient Reported? Taking?    Multiple Vitamin (MULTIVITAMIN ADULT PO)  Self Yes No   Sig: Take by mouth daily   Probiotic Product (PROBIOTIC PO)  Self Yes No   Sig: Take by mouth daily   atorvastatin (LIPITOR) 20 mg tablet   No No   Sig: Take 1 tablet (20 mg total) by mouth daily   levothyroxine 75 mcg tablet   No No   Sig: Take 1 tablet (75 mcg total) by mouth daily   loratadine (CLARITIN) 10 mg tablet  Self Yes No   Sig: Take 10 mg by mouth daily      Facility-Administered Medications: None       Past Medical History:   Diagnosis Date    Basal cell carcinoma     Per patient , had a BCC on her left upper arm 6 years ago that was removed by the MOHS procedure     Disease of thyroid gland     Herpes zoster     Right, at the T6 dermatome  last assessed: 13    Hypercholesteremia        Past Surgical History:   Procedure Laterality Date     SECTION      CHOLECYSTECTOMY      MOHS SURGERY      Per patient, had the MOHS procedure for a BCC left upper arm 6 years ago    US GUIDED BREAST BIOPSY LEFT COMPLETE Left 2019       Family History   Problem Relation Age of Onset    Diabetes Father     Diabetes Sister     Thyroid disease Sister         Disorder    Autoimmune disease Brother         Disorder    Diabetes Brother     Thyroid disease Brother     Breast cancer Maternal Aunt      I have reviewed and agree with the history as documented  E-Cigarette/Vaping    E-Cigarette Use Never User      E-Cigarette/Vaping Substances    Nicotine No     THC No     CBD No     Flavoring No     Other No     Unknown No      Social History     Tobacco Use    Smoking status: Never Smoker    Smokeless tobacco: Never Used   Vaping Use    Vaping Use: Never used   Substance Use Topics    Alcohol use: No    Drug use: No       Review of Systems   Cardiovascular: Negative for chest pain and leg swelling  Musculoskeletal: Negative for back pain and gait problem  Skin: Negative for rash and wound  Neurological: Negative for syncope and headaches     All other systems reviewed and are negative  Physical Exam  Physical Exam  Vitals and nursing note reviewed  Constitutional:       General: She is not in acute distress  Appearance: She is well-developed  She is not toxic-appearing or diaphoretic  HENT:      Head: Normocephalic and atraumatic  Right Ear: External ear normal       Left Ear: External ear normal       Nose: Nose normal    Eyes:      General: No scleral icterus  Pulmonary:      Effort: Pulmonary effort is normal  No respiratory distress  Abdominal:      General: There is no distension  Musculoskeletal:         General: No deformity  Normal range of motion  Comments: Tenderness ulnar aspect of right wrist  Full ROM, but with pain  No significant edema  No tenderness of the anatomic snuff box  Normal distal sensation and perfusion  Right elbow and shoulder WNL  No bony tenderness of the knees bilaterally  Full ROM  Stable on exam  No palpable effusion  Small area of bruising of the sole of the right foot with tenderness  No tenderness over metatarsals  Skin:     General: Skin is warm and dry  Findings: No rash  Neurological:      General: No focal deficit present  Mental Status: She is alert        Gait: Gait normal    Psychiatric:         Mood and Affect: Mood normal          Vital Signs  ED Triage Vitals [03/06/22 0804]   Temperature Pulse Respirations Blood Pressure SpO2   98 1 °F (36 7 °C) 80 18 162/72 --      Temp Source Heart Rate Source Patient Position - Orthostatic VS BP Location FiO2 (%)   Temporal Monitor Lying Left arm --      Pain Score       7           Vitals:    03/06/22 0804   BP: 162/72   Pulse: 80   Patient Position - Orthostatic VS: Lying         Visual Acuity  Visual Acuity      Most Recent Value   L Pupil Size (mm) 3   R Pupil Size (mm) 3          ED Medications  Medications   acetaminophen (TYLENOL) tablet 975 mg (975 mg Oral Given 3/6/22 0922)   ibuprofen (MOTRIN) tablet 400 mg (400 mg Oral Given 3/6/22 7241)       Diagnostic Studies  Results Reviewed     None                 XR wrist 3+ views RIGHT   ED Interpretation by Aleksandra Stone MD (03/06 4102)   Degenerative changes  No acute fractures or dislocations      Final Result by Dario Laurent MD (03/06 3180)      Possible nondisplaced avulsion of the distal lateral carpal scaphoid  The study was marked in [facility] for immediate notification  Workstation performed: [id number]                    Procedures  Procedures         ED Course                                             MDM  Number of Diagnoses or Management Options  Bilateral knee pain: new and requires workup  Contusion of sole of right foot: new and requires workup  Fall, initial encounter: new and requires workup  Nondisplaced fracture of right scaphoid bone: new and requires workup  Diagnosis management comments: 55year old female presents for evaluation after fall last night  Tenderness ulnar aspect of right wrist  Small contusion sole of right foot  Xray wrist read by radiology as nondisplaced right scaphoid fracture  Patient placed in preformed wrist splint  Ortho follow up  Return precautions provided  Amount and/or Complexity of Data Reviewed  Tests in the radiology section of CPT®: ordered and reviewed  Independent visualization of images, tracings, or specimens: yes    Patient Progress  Patient progress: stable      Disposition  Final diagnoses:   Contusion of sole of right foot   Fall, initial encounter   Bilateral knee pain   Nondisplaced fracture of right scaphoid bone     Time reflects when diagnosis was documented in both MDM as applicable and the Disposition within this note     Time User Action Codes Description Comment    3/6/2022  8:25 AM Yoanna Parra Add [S90 31XA] Contusion of sole of right foot     3/6/2022  8:26 AM Delcia Saver Add [B57  HPSG] Fall, initial encounter     3/6/2022  8:26 AM Yoanna Parra Pay Add [S52 913,  M58 562] Bilateral knee pain     3/6/2022 10:59 AM Lan Sullivan Add [S62 001A] Nondisplaced fracture of right scaphoid bone     3/6/2022 10:59 AM Lan Parra Modify [S90 31XA] Contusion of sole of right foot     3/6/2022 10:59 AM Steve Colinl Modify [S62 001A] Nondisplaced fracture of right scaphoid bone       ED Disposition     ED Disposition Condition Date/Time Comment    Discharge Stable Sun Mar 6, 2022 11:34 AM Allen Allan discharge to home/self care              Follow-up Information     Follow up With Specialties Details Why Contact Info Additional [address] [facility] Schedule an appointment as soon as possible for a visit in 1 week for re-evaluation [phone] [address] [phone]  [address] [place] [address] [facility]ists [facility], [address] [place] [address] 310     [address] Emergency Department Emergency Medicine Go to  If symptoms worsen, numbness or discoloration of fingers [address] [place], [id number]  [phone] [address] Emergency Department, [address] [facility] [address]          Discharge Medication List as of 3/6/2022 11:36 AM      CONTINUE these medications which have NOT CHANGED    Details   atorvastatin (LIPITOR) 20 mg tablet Take 1 tablet (20 mg total) by mouth daily, Starting Mon 12/20/2021, Until Wed 1/19/2022, Normal      levothyroxine 75 mcg tablet Take 1 tablet (75 mcg total) by mouth daily, Starting Mon 12/20/2021, Normal      loratadine (CLARITIN) 10 mg tablet Take 10 mg by mouth daily, Historical Med      Multiple Vitamin (MULTIVITAMIN ADULT PO) Take by mouth daily, Historical Med      Probiotic Product (PROBIOTIC PO) Take by mouth daily, Historical Med                 PDMP Review     None          ED Provider  Electronically Signed by Ibrahima Aburto MD  03/06/22 1500

## 2022-03-08 ENCOUNTER — OFFICE VISIT (OUTPATIENT)
Dept: OBGYN CLINIC | Facility: CLINIC | Age: 57
End: 2022-03-08
Payer: COMMERCIAL

## 2022-03-08 VITALS
SYSTOLIC BLOOD PRESSURE: 140 MMHG | HEIGHT: 64 IN | BODY MASS INDEX: 43.71 KG/M2 | WEIGHT: 256 LBS | DIASTOLIC BLOOD PRESSURE: 80 MMHG

## 2022-03-08 DIAGNOSIS — S62.001A NONDISPLACED FRACTURE OF RIGHT SCAPHOID BONE: ICD-10-CM

## 2022-03-08 DIAGNOSIS — S62.001A CLOSED NONDISPLACED FRACTURE OF SCAPHOID OF RIGHT WRIST, UNSPECIFIED PORTION OF SCAPHOID, INITIAL ENCOUNTER: ICD-10-CM

## 2022-03-08 DIAGNOSIS — M25.531 PAIN IN RIGHT WRIST: Primary | ICD-10-CM

## 2022-03-08 DIAGNOSIS — S63.501A RIGHT WRIST SPRAIN, INITIAL ENCOUNTER: ICD-10-CM

## 2022-03-08 PROCEDURE — 99214 OFFICE O/P EST MOD 30 MIN: CPT | Performed by: PHYSICIAN ASSISTANT

## 2022-03-08 NOTE — PROGRESS NOTES
Orthopaedic Surgery - Office Note  Gina Daniels (89 y o  female)   : 1965   MRN: [id number]  Encounter Date: 3/8/2022    No chief complaint on file  Chief complaint is right wrist pain  Assessment/Plan  Diagnoses and all orders for this visit:    Pain in right wrist    Closed nondisplaced fracture of scaphoid of right wrist, initial encounter-nondisplaced avulsion of the distal lateral carpal scaphoid    Nondisplaced fracture of right scaphoid bone  -     Ambulatory Referral to Orthopedic Surgery    Right wrist sprain, initial encounter    The diagnosis as well as treatment options were reviewed with the patient in the office today  Specifically I reviewed her x-rays and radiologist interpretation of the scaphoid  I discussed with the patient she is not significantly symptomatic on physical exam but due to the location of the fracture I would recommend at least 2 weeks of immobilization to continue in the thumb spica splint  She was advised she does have a wrist sprain that will need some occupational therapy, but I will hold on scheduling this until we allow 2 weeks of immobilization in the thumb spica  Plan will be for patient to return in 2 weeks for repeat evaluation and see if there is any anatomical snuffbox tenderness  If painful we may need to consider repeating an x-ray  If asymptomatic she may need occupational therapy started  Patient has requested a note for work as she has been able to perform her duties due to wearing the wrist splint  Patient will continue to ice 20 minutes on 1 hour off 3 times a day, she will elevate the hand above the level of her heart for edema control  Appropriate education was provided wearing the wrist splint  She may continue the oral anti-inflammatory either Advil or Aleve in combination with Tylenol for pain control  Return 2 weeks with Hand surgery or Ortho surgery          History of Present Illness  This is a new patient with right wrist pain  Patient had a fall at a restaurant on 03/05/2022  The following day patient went to the emergency department with multiple body parts and pain  Patient had an x-ray of the wrist showing possible fracture of the scaphoid  Presents today for evaluation and treatment  Patient was placed in a preformed thumb spica wrist splint at the emergency department and referred today for evaluation and treatment  Patient reports she has had no pain in the wrist while wearing the splint  She states she has been using Motrin and Tylenol with good relief of any pain  No paresthesias are reported  She is right-hand dominant  She reports she has been able to work while wearing the splint  Review of Systems  Pertinent items are noted in HPI  All other systems were reviewed and are negative  Physical Exam  /80   Ht 5' 4" (1 626 m)   Wt 116 kg (256 lb)   BMI 43 94 kg/m²   Cons: Appears well  No apparent distress  Psych: Alert  Oriented x3  Mood and affect normal   Eyes: PERRLA, EOMI  Resp: Normal effort  No audible wheezing or stridor  CV: Palpable pulse  No discernable arrhythmia  Lymph:  No palpable cervical, axillary, or inguinal lymphadenopathy  Skin: Warm  No palpable masses  No visible lesions  Neuro: Normal muscle tone  Normal and symmetric DTR's  Patient's right wrist has no skin breakdown lesions or signs of infection  There is no ecchymosis or soft tissue edema  She is nontender to palpation at the distal radius and ulna  She has no tenderness or crepitus to palpation at the TFCC  She has mild pain against resistance to wrist extension  No pain against resistance to wrist flexion  Neurovascularly she is intact in the right upper extremity  Capillary refill is normal   She has no significant tenderness to palpation in the anatomical snuffbox  She has noted pain with active and passive range of motion of the right thumb which is full          Studies Reviewed  Study Result    Narrative & Impression   RIGHT WRIST     INDICATION:   injury      COMPARISON:  None     VIEWS:  XR WRIST 3+ VW RIGHT   Images: 4     FINDINGS:     Possible nondisplaced avulsion fracture of the lateral aspect of the distal carpal scaphoid  No other fractures      Mild scaphoid trapezium degenerative changes      No lytic or blastic osseous lesion      Soft tissues are unremarkable      IMPRESSION:     Possible nondisplaced avulsion of the distal lateral carpal scaphoid      The study was marked in EPIC for immediate notification            Workstation performed: [id number]     Wrist x-ray images and reports were reviewed by myself in the office today  Patient's emergency department notes were reviewed by myself in the office today  Procedures  No procedures today  Medical, Surgical, Family, and Social History  The patient's medical history, family history, and social history, were reviewed and updated as appropriate  Past Medical History:   Diagnosis Date    Basal cell carcinoma     Per patient , had a BCC on her left upper arm 6 years ago that was removed by the MOHS procedure     Disease of thyroid gland     Herpes zoster     Right, at the T6 dermatome   last assessed: 13    Hypercholesteremia        Past Surgical History:   Procedure Laterality Date     SECTION      CHOLECYSTECTOMY      MOHS SURGERY      Per patient, had the MOHS procedure for a BCC left upper arm 6 years ago    US GUIDED BREAST BIOPSY LEFT COMPLETE Left 2019       Family History   Problem Relation Age of Onset    Diabetes Father     Diabetes Sister     Thyroid disease Sister         Disorder    Autoimmune disease Brother         Disorder    Diabetes Brother     Thyroid disease Brother     Breast cancer Maternal Aunt        Social History     Occupational History    Not on file   Tobacco Use    Smoking status: Never Smoker    Smokeless tobacco: Never Used   Vaping Use    Vaping Use: Never used   Substance and Sexual Activity    Alcohol use: No    Drug use: No    Sexual activity: Not Currently       Allergies   Allergen Reactions    Molds & Smuts Other (See Comments)     congestion         Current Outpatient Medications:     atorvastatin (LIPITOR) 20 mg tablet, Take 1 tablet (20 mg total) by mouth daily, Disp: 90 tablet, Rfl: 1    levothyroxine 75 mcg tablet, Take 1 tablet (75 mcg total) by mouth daily, Disp: 90 tablet, Rfl: 1    loratadine (CLARITIN) 10 mg tablet, Take 10 mg by mouth daily, Disp: , Rfl:     Multiple Vitamin (MULTIVITAMIN ADULT PO), Take by mouth daily, Disp: , Rfl:     Probiotic Product (PROBIOTIC PO), Take by mouth daily, Disp: , Rfl:       [employer], PA-C

## 2022-03-08 NOTE — LETTER
March 8, 2022     Patient: Farhat Haque   YOB: 1965   Date of Visit: 3/8/2022       To Whom it May Concern:    Alba julian is under my professional care  She was seen in my office on 3/8/2022  Patient is out of work from today until next evaluation in 2 weeks  If you have any questions or concerns, please don't hesitate to call  Sincerely,          Brett Meeks PA-C        CC: Paulie Daniels

## 2022-03-22 ENCOUNTER — OFFICE VISIT (OUTPATIENT)
Dept: OBGYN CLINIC | Facility: CLINIC | Age: 57
End: 2022-03-22
Payer: COMMERCIAL

## 2022-03-22 ENCOUNTER — APPOINTMENT (OUTPATIENT)
Dept: RADIOLOGY | Facility: CLINIC | Age: 57
End: 2022-03-22
Payer: COMMERCIAL

## 2022-03-22 VITALS
HEIGHT: 64 IN | WEIGHT: 256 LBS | SYSTOLIC BLOOD PRESSURE: 120 MMHG | DIASTOLIC BLOOD PRESSURE: 82 MMHG | BODY MASS INDEX: 43.71 KG/M2

## 2022-03-22 DIAGNOSIS — M25.531 ACUTE PAIN OF RIGHT WRIST: ICD-10-CM

## 2022-03-22 DIAGNOSIS — S62.001A CLOSED NONDISPLACED FRACTURE OF SCAPHOID OF RIGHT WRIST, UNSPECIFIED PORTION OF SCAPHOID, INITIAL ENCOUNTER: ICD-10-CM

## 2022-03-22 DIAGNOSIS — S63.501A RIGHT WRIST SPRAIN, INITIAL ENCOUNTER: Primary | ICD-10-CM

## 2022-03-22 PROCEDURE — 3008F BODY MASS INDEX DOCD: CPT | Performed by: ORTHOPAEDIC SURGERY

## 2022-03-22 PROCEDURE — 73110 X-RAY EXAM OF WRIST: CPT

## 2022-03-22 PROCEDURE — 1036F TOBACCO NON-USER: CPT | Performed by: ORTHOPAEDIC SURGERY

## 2022-03-22 PROCEDURE — 99213 OFFICE O/P EST LOW 20 MIN: CPT | Performed by: ORTHOPAEDIC SURGERY

## 2022-03-22 NOTE — PROGRESS NOTES
Assessment:     1  Right wrist sprain, initial encounter    2  Acute pain of right wrist        Plan:     Problem List Items Addressed This Visit        Musculoskeletal and Integument    Right wrist sprain, initial encounter - Primary    Relevant Orders    Ambulatory Referral to PT/OT Hand Therapy      Other Visit Diagnoses     Acute pain of right wrist        Relevant Orders    XR wrist 3+ vw right       Findings consistent with right wrist sprain, possible avulsion fracture of scaphoid  Imaging and prognosis reviewed with patient  She will d/c thumb spica splint and have OT make custom volar splint and start OT to rehab wrist  Therapy will help guide her return to activity  Avoid strenuous activities at this time  Remove splint at night but wear with activity  OTC anti inflammatories as needed for pain  See patient back in 6 weeks  All patient's questions were answered to her satisfaction  This note is created using dictation transcription  It may contain typographical errors, grammatical errors, improperly dictated words, background noise and other errors  Subjective:     Patient ID: Sindi Presley is a 55 y o  female  Chief Complaint:  55 yr old female RHD in for evaluation of right scaphoid fracture  Patient had a fall at a restaurant on 03/05/2022 on outstretched hand  Seen at ED after injury, mauro Meeks 3/8/22 and placed in thumb spica splint for nondisplaced avulsion fracture of scaphoid  She has been wearing splint at all times except to shower  Mild pain in region of fracture more of general dull ache pain  Denies any numbness or tingling in wrist   Information on patient's intake form was reviewed      Allergy:  Allergies   Allergen Reactions    Molds & Smuts Other (See Comments)     congestion     Medications:  all current active meds have been reviewed  Past Medical History:  Past Medical History:   Diagnosis Date    Basal cell carcinoma     Per patient , had a BCC on her left upper arm 6 years ago that was removed by the MOHS procedure     Disease of thyroid gland     Herpes zoster     Right, at the T6 dermatome  last assessed: 13    Hypercholesteremia      Past Surgical History:  Past Surgical History:   Procedure Laterality Date     SECTION      CHOLECYSTECTOMY      MOHS SURGERY      Per patient, had the MOHS procedure for a BCC left upper arm 6 years ago    US GUIDED BREAST BIOPSY LEFT COMPLETE Left 2019     Family History:  Family History   Problem Relation Age of Onset    Diabetes Father     Diabetes Sister     Thyroid disease Sister         Disorder    Autoimmune disease Brother         Disorder    Diabetes Brother     Thyroid disease Brother     Breast cancer Maternal Aunt      Social History:  Social History     Substance and Sexual Activity   Alcohol Use No     Social History     Substance and Sexual Activity   Drug Use No     Social History     Tobacco Use   Smoking Status Never Smoker   Smokeless Tobacco Never Used     Review of Systems   Constitutional: Negative for chills and fever  HENT: Negative for ear pain and sore throat  Eyes: Negative for pain and visual disturbance  Respiratory: Negative for cough and shortness of breath  Cardiovascular: Negative for chest pain and palpitations  Gastrointestinal: Negative for abdominal pain and vomiting  Genitourinary: Negative for dysuria and hematuria  Musculoskeletal: Positive for arthralgias (Right wrist)  Negative for back pain and joint swelling  Skin: Negative for color change and rash  Neurological: Negative for seizures and syncope  Psychiatric/Behavioral: Negative  All other systems reviewed and are negative          Objective:  BP Readings from Last 1 Encounters:   22 120/82      Wt Readings from Last 1 Encounters:   22 116 kg (256 lb)      BMI:   Estimated body mass index is 43 94 kg/m² as calculated from the following:    Height as of this encounter: 5' 4" (4 019 m)     Weight as of this encounter: 116 kg (256 lb)  BSA:   Estimated body surface area is 2 17 meters squared as calculated from the following:    Height as of this encounter: 5' 4" (1 626 m)  Weight as of this encounter: 116 kg (256 lb)  Physical Exam  Vitals and nursing note reviewed  Constitutional:       Appearance: Normal appearance  She is well-developed  HENT:      Head: Normocephalic and atraumatic  Right Ear: External ear normal       Left Ear: External ear normal    Eyes:      Extraocular Movements: Extraocular movements intact  Conjunctiva/sclera: Conjunctivae normal    Pulmonary:      Effort: Pulmonary effort is normal    Musculoskeletal:         General: Tenderness (right wrist arthralgia ) present  Cervical back: Neck supple  Skin:     General: Skin is warm and dry  Neurological:      Mental Status: She is alert and oriented to person, place, and time  Deep Tendon Reflexes: Reflexes are normal and symmetric  Psychiatric:         Mood and Affect: Mood normal          Behavior: Behavior normal        Right Hand Exam     Tenderness   The patient is experiencing tenderness in the dorsal area (No pain in snuff box)  Range of Motion   Wrist   Extension: normal Right wrist extension: Pain dorsally  Flexion: normal   Pronation: normal   Supination: normal     Tests   Tinel's sign (median nerve): negative  Finkelstein's test: negative    Other   Erythema: absent  Scars: absent  Sensation: normal  Pulse: present    Comments:  Mild pain with radial deviation of wrist , no pain with ulnar deviation             I have personally reviewed pertinent films in PACS and my interpretation is xr rigth wrist demonstrates possible nondisplaced avulsion fracture of scaphoid , well maintained alignment and position of scaphoid        Scribe Attestation    I,:  Iván Burris am acting as a scribe while in the presence of the attending physician :       I,:  Bill Yan MD personally performed the services described in this documentation    as scribed in my presence :

## 2022-04-11 ENCOUNTER — TELEPHONE (OUTPATIENT)
Dept: FAMILY MEDICINE CLINIC | Facility: CLINIC | Age: 57
End: 2022-04-11

## 2022-04-11 DIAGNOSIS — Z11.59 ENCOUNTER FOR HEPATITIS C SCREENING TEST FOR LOW RISK PATIENT: ICD-10-CM

## 2022-04-11 DIAGNOSIS — R73.03 PREDIABETES: ICD-10-CM

## 2022-04-11 DIAGNOSIS — R63.1 EXCESSIVE THIRST: ICD-10-CM

## 2022-04-11 DIAGNOSIS — E55.9 VITAMIN D DEFICIENCY: ICD-10-CM

## 2022-04-11 DIAGNOSIS — E78.2 MIXED HYPERLIPIDEMIA: Primary | ICD-10-CM

## 2022-04-11 NOTE — TELEPHONE ENCOUNTER
[facility] is wondering if you can place orders for blood work to check to see if she has diabetes and also check her Thyroid  She did make an appt for her Physical    She uses [facility] in [place]    Please advise  Thank you

## 2022-04-28 LAB
25(OH)D3+25(OH)D2 SERPL-MCNC: 36 NG/ML (ref 30–100)
ALBUMIN SERPL-MCNC: 4.7 G/DL (ref 3.8–4.9)
ALBUMIN/GLOB SERPL: 1.5 {RATIO} (ref 1.2–2.2)
ALP SERPL-CCNC: 95 IU/L (ref 44–121)
ALT SERPL-CCNC: 36 IU/L (ref 0–32)
APPEARANCE UR: CLEAR
AST SERPL-CCNC: 28 IU/L (ref 0–40)
BASOPHILS # BLD AUTO: 0 X10E3/UL (ref 0–0.2)
BASOPHILS NFR BLD AUTO: 1 %
BILIRUB SERPL-MCNC: 0.4 MG/DL (ref 0–1.2)
BILIRUB UR QL STRIP: NEGATIVE
BUN SERPL-MCNC: 13 MG/DL (ref 6–24)
BUN/CREAT SERPL: 18 (ref 9–23)
CALCIUM SERPL-MCNC: 10.3 MG/DL (ref 8.7–10.2)
CHLORIDE SERPL-SCNC: 100 MMOL/L (ref 96–106)
CHOLEST SERPL-MCNC: 195 MG/DL (ref 100–199)
CHOLEST/HDLC SERPL: 3.8 RATIO (ref 0–4.4)
CO2 SERPL-SCNC: 23 MMOL/L (ref 20–29)
COLOR UR: YELLOW
CREAT SERPL-MCNC: 0.74 MG/DL (ref 0.57–1)
EGFR: 95 ML/MIN/1.73
EOSINOPHIL # BLD AUTO: 0.3 X10E3/UL (ref 0–0.4)
EOSINOPHIL NFR BLD AUTO: 4 %
ERYTHROCYTE [DISTWIDTH] IN BLOOD BY AUTOMATED COUNT: 12.8 % (ref 11.7–15.4)
GLOBULIN SER-MCNC: 3.1 G/DL (ref 1.5–4.5)
GLUCOSE SERPL-MCNC: 107 MG/DL (ref 65–99)
GLUCOSE UR QL: NEGATIVE
HBA1C MFR BLD: 6.3 % (ref 4.8–5.6)
HCT VFR BLD AUTO: 39.7 % (ref 34–46.6)
HCV AB S/CO SERPL IA: 0.1 S/CO RATIO (ref 0–0.9)
HDLC SERPL-MCNC: 52 MG/DL
HGB BLD-MCNC: 14 G/DL (ref 11.1–15.9)
HGB UR QL STRIP: NEGATIVE
IMM GRANULOCYTES # BLD: 0 X10E3/UL (ref 0–0.1)
IMM GRANULOCYTES NFR BLD: 0 %
KETONES UR QL STRIP: NEGATIVE
LDLC SERPL CALC-MCNC: 93 MG/DL (ref 0–99)
LDLC SERPL DIRECT ASSAY-MCNC: 99 MG/DL (ref 0–99)
LEUKOCYTE ESTERASE UR QL STRIP: NEGATIVE
LYMPHOCYTES # BLD AUTO: 2.3 X10E3/UL (ref 0.7–3.1)
LYMPHOCYTES NFR BLD AUTO: 29 %
MCH RBC QN AUTO: 30 PG (ref 26.6–33)
MCHC RBC AUTO-ENTMCNC: 35.3 G/DL (ref 31.5–35.7)
MCV RBC AUTO: 85 FL (ref 79–97)
MICRO URNS: NORMAL
MONOCYTES # BLD AUTO: 0.6 X10E3/UL (ref 0.1–0.9)
MONOCYTES NFR BLD AUTO: 7 %
NEUTROPHILS # BLD AUTO: 4.6 X10E3/UL (ref 1.4–7)
NEUTROPHILS NFR BLD AUTO: 59 %
NITRITE UR QL STRIP: NEGATIVE
PH UR STRIP: 5.5 [PH] (ref 5–7.5)
PLATELET # BLD AUTO: 293 X10E3/UL (ref 150–450)
POTASSIUM SERPL-SCNC: 4.8 MMOL/L (ref 3.5–5.2)
PROT SERPL-MCNC: 7.8 G/DL (ref 6–8.5)
PROT UR QL STRIP: NEGATIVE
RBC # BLD AUTO: 4.66 X10E6/UL (ref 3.77–5.28)
SL AMB VLDL CHOLESTEROL CALC: 50 MG/DL (ref 5–40)
SODIUM SERPL-SCNC: 137 MMOL/L (ref 134–144)
SP GR UR: 1 (ref 1–1.03)
TRIGL SERPL-MCNC: 302 MG/DL (ref 0–149)
TSH SERPL DL<=0.005 MIU/L-ACNC: 3.31 UIU/ML (ref 0.45–4.5)
UROBILINOGEN UR STRIP-ACNC: 0.2 MG/DL (ref 0.2–1)
WBC # BLD AUTO: 7.8 X10E3/UL (ref 3.4–10.8)

## 2022-05-17 ENCOUNTER — OFFICE VISIT (OUTPATIENT)
Dept: FAMILY MEDICINE CLINIC | Facility: CLINIC | Age: 57
End: 2022-05-17
Payer: COMMERCIAL

## 2022-05-17 VITALS
OXYGEN SATURATION: 97 % | HEIGHT: 64 IN | WEIGHT: 257 LBS | DIASTOLIC BLOOD PRESSURE: 80 MMHG | BODY MASS INDEX: 43.87 KG/M2 | RESPIRATION RATE: 16 BRPM | SYSTOLIC BLOOD PRESSURE: 128 MMHG | TEMPERATURE: 101.5 F | HEART RATE: 113 BPM

## 2022-05-17 DIAGNOSIS — R60.0 BILATERAL LOWER EXTREMITY EDEMA: ICD-10-CM

## 2022-05-17 DIAGNOSIS — U07.1 COVID-19 VIRUS INFECTION: Primary | ICD-10-CM

## 2022-05-17 DIAGNOSIS — R01.1 HEART MURMUR: ICD-10-CM

## 2022-05-17 LAB
SARS-COV-2 AG UPPER RESP QL IA: POSITIVE
VALID CONTROL: ABNORMAL

## 2022-05-17 PROCEDURE — 99214 OFFICE O/P EST MOD 30 MIN: CPT | Performed by: FAMILY MEDICINE

## 2022-05-17 PROCEDURE — 3725F SCREEN DEPRESSION PERFORMED: CPT | Performed by: FAMILY MEDICINE

## 2022-05-17 PROCEDURE — 3008F BODY MASS INDEX DOCD: CPT | Performed by: FAMILY MEDICINE

## 2022-05-17 PROCEDURE — 87811 SARS-COV-2 COVID19 W/OPTIC: CPT | Performed by: FAMILY MEDICINE

## 2022-05-17 NOTE — PROGRESS NOTES
COVID-19 Outpatient Progress Note    Assessment/Plan:    Problem List Items Addressed This Visit    None     Visit Diagnoses     COVID-19 virus infection    -  Primary    Relevant Medications    nirmatrelvir & ritonavir (Paxlovid) tablet therapy pack    Other Relevant Orders    POCT Rapid Covid Ag (Completed)    Bilateral lower extremity edema        Relevant Orders    Echo complete w/ contrast if indicated    VAS reflux lower limb venous duplex study with reflux assessment, complete bilateral    Heart murmur        Relevant Orders    Echo complete w/ contrast if indicated         Disposition:     Rapid antigen testing was performed and the result is POSITIVE for COVID-19  Patient has COVID-19 infection  Based off CDC guidelines, they were recommended to isolate for 5 days from the date of the positive test  If they remain asymptomatic, isolation may be ended followed by 5 days of wearing a mask when around othes to minimize risk of infecting others  If they have a fever, continue to stay home until fever resolves for at least 24 hours  I recommended continued isolation until at least 24 hours have passed since recovery defined as resolution of fever without the use of fever-reducing medications AND improvement in COVID symptoms AND 10 days have passed since onset of symptoms (or 10 days have passed since date of first positive viral diagnostic test for asymptomatic patients)  Discussed symptom directed medication options with patient  Discussed vitamin D, vitamin C, and/or zinc supplementation with patient  I recommended that the patient continue symptomatic treatment  I advised that she continue home isolation measures including:  Staying home  Stay in a specific "sick room" or area and away from other people or animals, including pets  Always wear a mask when leaving your room  Use a separate bathroom, if available  Wipe down all commonly touched surfaces with a household     I advised  her that she needs to stay out of work until further notice  The CDC guidelines currently states that any COVID-19 positive patient has to be fever free without medication for 24 hours and it has to be 10 days since symptom onset  In addition, I advised the patient that all household contacts need to self quarantine themselves for 14 days as a precaution  I also advised the patient to continue with her supportive care and also to practice deep breathing exercises regularly  Uziel Ambrosio was advised that we will be following up with her for daily video visits that will be submitted to her insurance  Uziel Ambrosio was given the opportunity to ask questions  The patient was advised to contact our office with any worsening chest pain, shortness of breath, or worsening fever  We will follow up with the patient as scheduled for her next telemedicine video visit  We will reschedule the patient's physical at another time  I did order an echocardiogram as well as a vascular study to further evaluate her edema  We briefly reviewed her lab work and there were no acute findings  The patient is agreeable to starting antiviral therapy with Paxlovid  I did advise the patient that she will need to temporarily stop her atorvastatin while taking this medication  We will monitor her very closely  We will schedule follow-up virtual visit later this week  We will schedule her physical at another time as well  Patient meets criteria for PAXLOVID and they have been counseled appropriately according to EUA documentation released by the FDA  After discussion, patient agrees to treatment  Jace Muñoz is an investigational medicine used to treat mild-to-moderate COVID-19 in adults and children (15years of age and older weighing at least 80 pounds (40 kg)) with positive results of direct SARS-CoV-2 viral testing, and who are at high risk for progression to severe COVID-19, including hospitalization or death   PAXLOVID is investigational because it is still being studied  There is limited information about the safety and effectiveness of using PAXLOVID to treat people with mild-to-moderate COVID-19  The FDA has authorized the emergency use of PAXLOVID for the treatment of mild-tomoderate COVID-19 in adults and children (15years of age and older weighing at least 80 pounds (40 kg)) with a positive test for the virus that causes COVID-19, and who are at high risk for progression to severe COVID-19, including hospitalization or death, under an EUA  What should I tell my healthcare provider before I take PAXLOVID? Tell your healthcare provider if you:  - Have any allergies  - Have liver or kidney disease  - Are pregnant or plan to become pregnant  - Are breastfeeding a child  - Have any serious illnesses    Tell your healthcare provider about all the medicines you take, including prescription and over-the-counter medicines, vitamins, and herbal supplements  Some medicines may interact with PAXLOVID and may cause serious side effects  Keep a list of your medicines to show your healthcare provider and pharmacist when you get a new medicine  You can ask your healthcare provider or pharmacist for a list of medicines that interact with PAXLOVID  Do not start taking a new medicine without telling your healthcare provider  Your healthcare provider can tell you if it is safe to take PAXLOVID with other medicines  Tell your healthcare provider if you are taking combined hormonal contraceptive  PAXLOVID may affect how your birth control pills work  Females who are able to become pregnant should use another effective alternative form of contraception or an additional barrier method of contraception  Talk to your healthcare provider if you have any questions about contraceptive methods that might be right for you  How do I take PAXLOVID? PAXLOVID consists of 2 medicines: nirmatrelvir and ritonavir    - Take 2 pink tablets of nirmatrelvir with 1 white tablet of ritonavir by mouth 2 times each day (in the morning and in the evening) for 5 days  For each dose, take all 3 tablets at the same time  - If you have kidney disease, talk to your healthcare provider  You may need a different dose  - Swallow the tablets whole  Do not chew, break, or crush the tablets  - Take PAXLOVID with or without food  - Do not stop taking PAXLOVID without talking to your healthcare provider, even if you feel better  - If you miss a dose of PAXLOVID within 8 hours of the time it is usually taken, take it as soon as you remember  If you miss a dose by more than 8 hours, skip the missed dose and take the next dose at your regular time  Do not take 2 doses of PAXLOVID at the same time  - If you take too much PAXLOVID, call your healthcare provider or go to the nearest hospital emergency room right away  - If you are taking a ritonavir- or cobicistat-containing medicine to treat hepatitis C or Human Immunodeficiency Virus (HIV), you should continue to take your medicine as prescribed by your healthcare provider   - Talk to your healthcare provider if you do not feel better or if you feel worse after 5 days  Who should generally not take PAXLOVID? Do not take PAXLOVID if:  You are allergic to nirmatrelvir, ritonavir, or any of the ingredients in PAXLOVID  You are taking any of the following medicines:  - Alfuzosin  - Pethidine, piroxicam, propoxyphene  - Ranolazine  - Amiodarone, dronedarone, flecainide, propafenone, quinidine  - Colchicine  - Lurasidone, pimozide, clozapine  - Dihydroergotamine, ergotamine, methylergonovine  - Lovastatin, simvastatin  - Sildenafil (Revatio®) for pulmonary arterial hypertension (PAH)  - Triazolam, oral midazolam  - Apalutamide  - Carbamazepine, phenobarbital, phenytoin  - Rifampin  - St  Evgenys Wort (hypericum perforatum)    What are the important possible side effects of PAXLOVID? Possible side effects of PAXLOVID are:  - Liver Problems  Tell your healthcare provider right away if you have any of these signs and symptoms of liver problems: loss of appetite, yellowing of your skin and the whites of eyes (jaundice), dark-colored urine, pale colored stools and itchy skin, stomach area (abdominal) pain  - Resistance to HIV Medicines  If you have untreated HIV infection, PAXLOVID may lead to some HIV medicines not working as well in the future  - Other possible side effects include: altered sense of taste, diarrhea, high blood pressure, or muscle aches    These are not all the possible side effects of PAXLOVID  Not many people have taken PAXLOVID  Serious and unexpected side effects may happen  Chris March is still being studied, so it is possible that all of the risks are not known at this time  What other treatment choices are there? Like Mary Kind may allow for the emergency use of other medicines to treat people with COVID-19  Go to [employer]  for information on the emergency use of other medicines that are authorized by FDA to treat people with COVID-19  Your healthcare provider may talk with you about clinical trials for which you may be eligible  It is your choice to be treated or not to be treated with PAXLOVID  Should you decide not to receive it or for your child not to receive it, it will not change your standard medical care  What if I am pregnant or breastfeeding? There is no experience treating pregnant women or breastfeeding mothers with PAXLOVID  For a mother and unborn baby, the benefit of taking PAXLOVID may be greater than the risk from the treatment  If you are pregnant, discuss your options and specific situation with your healthcare provider  It is recommended that you use effective barrier contraception or do not have sexual activity while taking PAXLOVID      If you are breastfeeding, discuss your options and specific situation with your healthcare provider  How do I report side effects with PAXLOVID? Contact your healthcare provider if you have any side effects that bother you or do not go away  Report side effects to FDA MedWatch at www fda gov/medwatch or call 1-942-KXM7755 or you can report side effects to [facility]  at the contact information provided below  Website Fax number Telephone number   manetch [phone]     How should I store Chris March? Store PAXLOVID tablets at room temperature between 68°F to 77°F (20°C to 25°C)  Full fact sheet for patients, parents, and caregivers can be found at: [email or web address]    I have spent 15 minutes directly with the patient  Greater than 50% of this time was spent in counseling/coordination of care regarding: diagnostic results, prognosis, risks and benefits of treatment options, instructions for management, patient and family education, importance of treatment compliance, risk factor reductions and impressions  Encounter provider Ellen Skinner DO    Provider located at [address] [place] [address] [phone]    Recent Visits  Date Type Provider Dept   05/17/22 Office Visit Ellen Skinner DO [facility]   Showing recent visits within past 7 days and meeting all other requirements  Future Appointments  No visits were found meeting these conditions  Showing future appointments within next 150 days and meeting all other requirements     Subjective: Zohreh Barron is a 65 y o  female who is concerned about COVID-19  Patient's symptoms include fever, chills, nasal congestion, rhinorrhea, cough and myalgias  Patient denies fatigue, malaise, sore throat, anosmia, loss of taste, shortness of breath, chest tightness, abdominal pain, nausea, vomiting, diarrhea and headaches       - Date of symptom onset: 5/16/2022      COVID-19 vaccination status: Fully vaccinated with booster    Exposure:   Contact with a person who is under investigation (PUI) for or who is positive for COVID-19 within the last 14 days?: No    Hospitalized recently for fever and/or lower respiratory symptoms?: No      Currently a healthcare worker that is involved in direct patient care?: No      Works in a special setting where the risk of COVID-19 transmission may be high? (this may include long-term care, correctional and [facility] facilities; homeless shelters; assisted-living facilities and group homes ): No      Resident in a special setting where the risk of COVID-19 transmission may be high? (this may include long-term care, correctional and [facility] facilities; homeless shelters; assisted-living facilities and group homes ): Audra Hu is a 65 y o  female who initially presented today for a physical but was found to have a fever in the office  A rapid antigen test was performed which was indeed positive  The patient did start with symptoms last night which she suspected where her allergies  She had some nasal congestion and runny nose initially  She has had an elevated temperature today in the office  She denies any shortness of breath  She does have nasal congestion as well as runny nose and a mild cough  She is starting to experience muscle aches  She is fully vaccinated and has had the COVID-19 booster as well  The patient denies any chest pain, shortness of breath, or palpitations  There is increased edema in both lower extremities for months  There are no headaches or visual changes  There is no lightheadedness, dizziness, or fainting spells  She has had swelling in her legs for 2 months- elevating her legs help- there are no varicose veins            Lab Results   Component Value Date    SARSCOVAG Positive (A) 05/17/2022     Past Medical History:   Diagnosis Date    Basal cell carcinoma Per patient , had a BCC on her left upper arm 6 years ago that was removed by the MOHS procedure     Disease of thyroid gland     Herpes zoster     Right, at the T6 dermatome  last assessed: 13    Hypercholesteremia      Past Surgical History:   Procedure Laterality Date     SECTION      CHOLECYSTECTOMY      MOHS SURGERY      Per patient, had the MOHS procedure for a BCC left upper arm 6 years ago    US GUIDED BREAST BIOPSY LEFT COMPLETE Left 2019     Current Outpatient Medications   Medication Sig Dispense Refill    atorvastatin (LIPITOR) 20 mg tablet Take 1 tablet (20 mg total) by mouth daily 90 tablet 1    Coenzyme Q10 (CO Q 10 PO) Take by mouth daily      levothyroxine 75 mcg tablet Take 1 tablet (75 mcg total) by mouth daily 90 tablet 1    loratadine (CLARITIN) 10 mg tablet Take 10 mg by mouth daily      Multiple Vitamin (MULTIVITAMIN ADULT PO) Take by mouth daily      nirmatrelvir & ritonavir (Paxlovid) tablet therapy pack Take 3 tablets by mouth in the morning and 3 tablets in the evening  Do all this for 5 days  Take 2 nirmatrelvir tablets + 1 ritonavir tablet together per dose  30 tablet 0    Omega-3 Fatty Acids (FISH OIL PO) Take by mouth daily       No current facility-administered medications for this visit  Allergies   Allergen Reactions    Molds & Smuts Other (See Comments)     congestion       Review of Systems   Constitutional: Positive for chills and fever  Negative for fatigue  HENT: Positive for congestion and rhinorrhea  Negative for sore throat  Eyes: Negative  Respiratory: Positive for cough  Negative for chest tightness and shortness of breath  Cardiovascular: Negative  Gastrointestinal: Negative  Negative for abdominal pain, diarrhea, nausea and vomiting  Endocrine: Negative  Genitourinary: Negative  Musculoskeletal: Positive for myalgias  Skin: Negative  Allergic/Immunologic: Negative  Neurological: Negative    Negative for headaches  Hematological: Negative  Psychiatric/Behavioral: Negative  Objective:    Vitals:    05/17/22 1412   BP: 128/80   BP Location: Right arm   Patient Position: Sitting   Cuff Size: Large   Pulse: (!) 113   Resp: 16   Temp: (!) 101 5 °F (38 6 °C)   TempSrc: Tympanic   SpO2: 97%   Weight: 117 kg (257 lb)   Height: 5' 4" (1 626 m)       Physical Exam  Constitutional:       General: She is not in acute distress  Appearance: She is well-developed  She is ill-appearing  She is not diaphoretic  HENT:      Head: Normocephalic and atraumatic  Right Ear: External ear normal       Left Ear: External ear normal       Nose: Nose normal       Mouth/Throat:      Pharynx: Posterior oropharyngeal erythema present  No oropharyngeal exudate  Eyes:      General: No scleral icterus  Right eye: No discharge  Left eye: No discharge  Pupils: Pupils are equal, round, and reactive to light  Neck:      Thyroid: No thyromegaly  Vascular: No JVD  Trachea: No tracheal deviation  Cardiovascular:      Rate and Rhythm: Normal rate and regular rhythm  Heart sounds: Murmur heard  Systolic murmur is present with a grade of 2/6  No friction rub  No gallop  Pulmonary:      Effort: Pulmonary effort is normal  No respiratory distress  Breath sounds: Normal breath sounds  No wheezing or rales  Chest:      Chest wall: No tenderness  Abdominal:      General: Bowel sounds are normal  There is no distension  Palpations: Abdomen is soft  There is no mass  Tenderness: There is no abdominal tenderness  There is no guarding or rebound  Hernia: No hernia is present  Musculoskeletal:         General: No tenderness or deformity  Normal range of motion  Cervical back: Normal range of motion and neck supple  Lymphadenopathy:      Cervical: No cervical adenopathy  Skin:     General: Skin is warm and dry  Coloration: Skin is not pale        Findings: No erythema or rash  Neurological:      Mental Status: She is alert and oriented to person, place, and time  Cranial Nerves: No cranial nerve deficit  Sensory: No sensory deficit  Motor: No abnormal muscle tone  Coordination: Coordination normal       Deep Tendon Reflexes: Reflexes normal    Psychiatric:         Behavior: Behavior normal          Thought Content:  Thought content normal        Recent Results (from the past 1008 hour(s))   CBC and differential    Collection Time: 04/27/22  8:40 AM   Result Value Ref Range    White Blood Cell Count 7 8 3 4 - 10 8 x10E3/uL    Red Blood Cell Count 4 66 3 77 - 5 28 x10E6/uL    Hemoglobin 14 0 11 1 - 15 9 g/dL    HCT 39 7 34 0 - 46 6 %    MCV 85 79 - 97 fL    MCH 30 0 26 6 - 33 0 pg    MCHC 35 3 31 5 - 35 7 g/dL    RDW 12 8 11 7 - 15 4 %    Platelet Count 218 484 - 450 x10E3/uL    Neutrophils 59 Not Estab  %    Lymphocytes 29 Not Estab  %    Monocytes 7 Not Estab  %    Eosinophils 4 Not Estab  %    Basophils PCT 1 Not Estab  %    Neutrophils (Absolute) 4 6 1 4 - 7 0 x10E3/uL    Lymphocytes (Absolute) 2 3 0 7 - 3 1 x10E3/uL    Monocytes (Absolute) 0 6 0 1 - 0 9 x10E3/uL    Eosinophils (Absolute) 0 3 0 0 - 0 4 x10E3/uL    Basophils ABS 0 0 0 0 - 0 2 x10E3/uL    Immature Granulocytes 0 Not Estab  %    Immature Granulocytes (Absolute) 0 0 0 0 - 0 1 x10E3/uL   Comprehensive metabolic panel    Collection Time: 04/27/22  8:40 AM   Result Value Ref Range    Glucose, Random 107 (H) 65 - 99 mg/dL    BUN 13 6 - 24 mg/dL    Creatinine 0 74 0 57 - 1 00 mg/dL    eGFR 95 >59 mL/min/1 73    SL AMB BUN/CREATININE RATIO 18 9 - 23    Sodium 137 134 - 144 mmol/L    Potassium 4 8 3 5 - 5 2 mmol/L    Chloride 100 96 - 106 mmol/L    CO2 23 20 - 29 mmol/L    CALCIUM 10 3 (H) 8 7 - 10 2 mg/dL    Protein, Total 7 8 6 0 - 8 5 g/dL    Albumin 4 7 3 8 - 4 9 g/dL    Globulin, Total 3 1 1 5 - 4 5 g/dL    Albumin/Globulin Ratio 1 5 1 2 - 2 2    TOTAL BILIRUBIN 0 4 0 0 - 1 2 mg/dL Alk Phos Isoenzymes 95 44 - 121 IU/L    AST 28 0 - 40 IU/L    ALT 36 (H) 0 - 32 IU/L   LDL cholesterol, direct    Collection Time: 04/27/22  8:40 AM   Result Value Ref Range    LDL Direct 99 0 - 99 mg/dL   Lipid panel    Collection Time: 04/27/22  8:40 AM   Result Value Ref Range    Cholesterol, Total 195 100 - 199 mg/dL    Triglycerides 302 (H) 0 - 149 mg/dL    HDL 52 >39 mg/dL    VLDL Cholesterol Calculated 50 (H) 5 - 40 mg/dL    LDL Calculated 93 0 - 99 mg/dL    T  Chol/HDL Ratio 3 8 0 0 - 4 4 ratio   TSH, 3rd generation with Free T4 reflex    Collection Time: 04/27/22  8:40 AM   Result Value Ref Range    TSH 3 310 0 450 - 4 500 uIU/mL   UA (URINE) with reflex to Scope    Collection Time: 04/27/22  8:40 AM   Result Value Ref Range    Specific Gravity 1 005 1 005 - 1 030    Ph 5 5 5 0 - 7 5    Color UA Yellow Yellow    Urine Appearance Clear Clear    Leukocyte Esterase Negative Negative    Protein Negative Negative/Trace    Glucose, 24 HR Urine Negative Negative    Ketone, Urine Negative Negative    Blood, Urine Negative Negative    Bilirubin, Urine Negative Negative    Urobilinogen Urine 0 2 0 2 - 1 0 mg/dL    SL AMB NITRITES URINE, QUAL  Negative Negative    Microscopic Examination Comment    Hemoglobin A1c (w/out EAG)    Collection Time: 04/27/22  8:40 AM   Result Value Ref Range    Hemoglobin A1C 6 3 (H) 4 8 - 5 6 %   Hepatitis C antibody    Collection Time: 04/27/22  8:40 AM   Result Value Ref Range    HEP C AB 0 1 0 0 - 0 9 s/co ratio   Vitamin D 25 hydroxy    Collection Time: 04/27/22  8:40 AM   Result Value Ref Range    25-HYDROXY VIT D 36 0 30 0 - 100 0 ng/mL   POCT Rapid Covid Ag    Collection Time: 05/17/22  2:33 PM   Result Value Ref Range    POCT SARS-CoV-2 Ag Positive (A) Negative    VALID CONTROL Valid            VIRTUAL VISIT DISCLAIMER    Carlie Epley Horger verbally agrees to participate in [employer]   Pt is aware that Virtual Care Services could be limited without vital signs or the ability to perform a full hands-on physical Nicholas Lundberg understands she or the provider may request at any time to terminate the video visit and request the patient to seek care or treatment in person

## 2022-05-17 NOTE — PATIENT INSTRUCTIONS

## 2022-05-19 ENCOUNTER — TELEMEDICINE (OUTPATIENT)
Dept: FAMILY MEDICINE CLINIC | Facility: CLINIC | Age: 57
End: 2022-05-19
Payer: COMMERCIAL

## 2022-05-19 VITALS — HEART RATE: 82 BPM | OXYGEN SATURATION: 97 %

## 2022-05-19 DIAGNOSIS — U07.1 COVID-19 VIRUS INFECTION: Primary | ICD-10-CM

## 2022-05-19 PROCEDURE — 1036F TOBACCO NON-USER: CPT | Performed by: FAMILY MEDICINE

## 2022-05-19 PROCEDURE — 99213 OFFICE O/P EST LOW 20 MIN: CPT | Performed by: FAMILY MEDICINE

## 2022-05-19 NOTE — PROGRESS NOTES
COVID-19 Outpatient Progress Note    Assessment/Plan:    Problem List Items Addressed This Visit    None     Visit Diagnoses     COVID-19 virus infection    -  Primary         Disposition:     Patient has COVID-19 infection  Based off CDC guidelines, they were recommended to isolate for 5 days from the date of the positive test  If they remain asymptomatic, isolation may be ended followed by 5 days of wearing a mask when around othes to minimize risk of infecting others  If they have a fever, continue to stay home until fever resolves for at least 24 hours  Discussed symptom directed medication options with patient  Discussed vitamin D, vitamin C, and/or zinc supplementation with patient  I recommended that the patient continue symptomatic treatment  I advised that she continue home isolation measures including:  Staying home  Stay in a specific "sick room" or area and away from other people or animals, including pets  Always wear a mask when leaving your room  Use a separate bathroom, if available  Wipe down all commonly touched surfaces with a household   I advised  her that she needs to stay out of work until further notice  The CDC guidelines currently states that any COVID-19 positive patient has to be fever free without medication for 24 hours and it has to be 5 days since symptom onset  In addition, I advised the patient that all household contacts need to self quarantine themselves for 14 days as a precaution  I also advised the patient to continue with her supportive care and also to practice deep breathing exercises regularly  The patient will continue with the Paxlovid as prescribed  We will see her back as needed  I have spent 15 minutes directly with the patient   Greater than 50% of this time was spent in counseling/coordination of care regarding: diagnostic results, prognosis, risks and benefits of treatment options, instructions for management, patient and family education, importance of treatment compliance, risk factor reductions and impressions  Encounter provider Tomy Hackett DO    Provider located at [address] [address] [phone]    Recent Visits  Date Type Provider Dept   05/17/22 Office Visit Tomy Hackett DO [facility]   Showing recent visits within past 7 days and meeting all other requirements  Today's Visits  Date Type Provider Dept   05/19/22 Telemedicine Velconrado Hackett,  [facility]   Showing today's visits and meeting all other requirements  Future Appointments  No visits were found meeting these conditions  Showing future appointments within next 150 days and meeting all other requirements     Chief Complaint   Patient presents with    COVID-19     Pt reports she feels a little better but still not well  Reports she feels weak and fatigue and feverish   COVID-19       This virtual check-in was done via [employer] and patient was informed that this is a secure, HIPAA-compliant platform  She agrees to proceed  Patient agrees to participate in a virtual check in via telephone or video visit instead of presenting to the office to address urgent/immediate medical needs  Patient is aware this is a billable service  After connecting through [facility], the patient was identified by name and date of birth  Steff Diaz was informed that this was a telemedicine visit and that the exam was being conducted confidentially over secure lines  My office door was closed  No one else was in the room  Steff Diaz acknowledged consent and understanding of privacy and security of the telemedicine visit  I informed the patient that I have reviewed her record in Epic and presented the opportunity for her to ask any questions regarding the visit today  The patient agreed to participate      Verification of patient location:  Patient is located in the following state in which I hold an active license: [place]    Subjective: Alma Delia Dos Santos is a 65 y o  female who has been screened for COVID-19  Symptom change since last report: improving  Patient's symptoms include nasal congestion, rhinorrhea, cough and myalgias  Patient denies fever, chills, fatigue, malaise, sore throat, anosmia, loss of taste, shortness of breath, chest tightness, abdominal pain, nausea, vomiting, diarrhea and headaches  - Date of symptom onset: 5/16/2022  - Date of positive COVID-19 test: 5/17/2022  Type of test: Rapid antigen  COVID-19 vaccination status: Fully vaccinated (primary series)    Davy Joyce has been staying home and has isolated themselves in her home  She is taking care to not share personal items and is cleaning all surfaces that are touched often, like counters, tabletops, and doorknobs using household cleaning sprays or wipes  She is wearing a mask when she leaves her room  Alma Delia Dos Santos is a 65 y o  female who presents today for a virtual visit to follow-up on her COVID-19 diagnosis  She is feeling better today  She did have a high fever on Tuesday evening which has subsided  She has been having some slight fevers on and off  Today she has more energy and she was able to get dressed  She still feels tired and is still coughing but there is no shortness of breath  She has been checking her pulse ox at home and has been ranging from 97-98% on room air  She has had some diarrhea on and off  She denies any chest pain  There is no nausea or vomiting  Overall she is feeling much better  The patient is taking the Paxlovid and is doing well with this          Lab Results   Component Value Date    SARSCOVAG Positive (A) 05/17/2022     Past Medical History:   Diagnosis Date    Basal cell carcinoma     Per patient , had a BCC on her left upper arm 6 years ago that was removed by the MOHS procedure     Disease of thyroid gland     Herpes zoster     Right, at the T6 dermatome  last assessed: 13    Hypercholesteremia      Past Surgical History:   Procedure Laterality Date     SECTION      CHOLECYSTECTOMY      MOHS SURGERY      Per patient, had the MOHS procedure for a BCC left upper arm 6 years ago    US GUIDED BREAST BIOPSY LEFT COMPLETE Left 2019     Current Outpatient Medications   Medication Sig Dispense Refill    atorvastatin (LIPITOR) 20 mg tablet Take 1 tablet (20 mg total) by mouth daily 90 tablet 1    Coenzyme Q10 (CO Q 10 PO) Take by mouth daily      levothyroxine 75 mcg tablet Take 1 tablet (75 mcg total) by mouth daily 90 tablet 1    loratadine (CLARITIN) 10 mg tablet Take 10 mg by mouth daily      Multiple Vitamin (MULTIVITAMIN ADULT PO) Take by mouth daily      nirmatrelvir & ritonavir (Paxlovid) tablet therapy pack Take 3 tablets by mouth in the morning and 3 tablets in the evening  Do all this for 5 days  Take 2 nirmatrelvir tablets + 1 ritonavir tablet together per dose  30 tablet 0    Omega-3 Fatty Acids (FISH OIL PO) Take by mouth daily       No current facility-administered medications for this visit  Allergies   Allergen Reactions    Molds & Smuts Other (See Comments)     congestion       Review of Systems   Constitutional: Negative  Negative for chills, fatigue and fever  HENT: Positive for congestion and rhinorrhea  Negative for sore throat  Eyes: Negative  Respiratory: Positive for cough  Negative for chest tightness and shortness of breath  Cardiovascular: Negative  Gastrointestinal: Negative  Negative for abdominal pain, diarrhea, nausea and vomiting  Endocrine: Negative  Genitourinary: Negative  Musculoskeletal: Positive for myalgias  Skin: Negative  Allergic/Immunologic: Negative  Neurological: Negative  Negative for headaches  Hematological: Negative  Psychiatric/Behavioral: Negative        Objective:    Vitals:    22 1559   Pulse: 82   SpO2: 97%   The above vitals were obtained by the patient at home and reported to our office since this is a virtual visit  Physical Exam  Constitutional:       General: She is not in acute distress  Appearance: She is well-developed  She is not diaphoretic  HENT:      Head: Normocephalic and atraumatic  Pulmonary:      Effort: Pulmonary effort is normal       Breath sounds: Normal breath sounds  Musculoskeletal:         General: Normal range of motion  Cervical back: Normal range of motion and neck supple  Neurological:      Mental Status: She is alert and oriented to person, place, and time  Psychiatric:         Behavior: Behavior normal          Thought Content: Thought content normal          Judgment: Judgment normal          VIRTUAL VISIT DISCLAIMER    Brunilda Allan verbally agrees to participate in [employer]  Pt is aware that [employer] could be limited without vital signs or the ability to perform a full hands-on physical Reuben Thompson understands she or the provider may request at any time to terminate the video visit and request the patient to seek care or treatment in person

## 2022-06-13 DIAGNOSIS — E78.2 MIXED HYPERLIPIDEMIA: ICD-10-CM

## 2022-06-13 DIAGNOSIS — E03.9 ACQUIRED HYPOTHYROIDISM: ICD-10-CM

## 2022-06-13 RX ORDER — ATORVASTATIN CALCIUM 20 MG/1
TABLET, FILM COATED ORAL
Qty: 90 TABLET | Refills: 1 | Status: SHIPPED | OUTPATIENT
Start: 2022-06-13

## 2022-06-13 RX ORDER — LEVOTHYROXINE SODIUM 0.07 MG/1
TABLET ORAL
Qty: 90 TABLET | Refills: 1 | Status: SHIPPED | OUTPATIENT
Start: 2022-06-13

## 2022-06-14 ENCOUNTER — HOSPITAL ENCOUNTER (OUTPATIENT)
Dept: NON INVASIVE DIAGNOSTICS | Facility: HOSPITAL | Age: 57
Discharge: HOME | End: 2022-06-14
Payer: COMMERCIAL

## 2022-06-14 ENCOUNTER — HOSPITAL ENCOUNTER (OUTPATIENT)
Dept: NON INVASIVE DIAGNOSTICS | Age: 57
Discharge: HOME | End: 2022-06-14
Payer: COMMERCIAL

## 2022-06-14 VITALS
BODY MASS INDEX: 43.87 KG/M2 | SYSTOLIC BLOOD PRESSURE: 128 MMHG | WEIGHT: 257 LBS | HEIGHT: 64 IN | DIASTOLIC BLOOD PRESSURE: 80 MMHG

## 2022-06-14 DIAGNOSIS — R01.1 HEART MURMUR: ICD-10-CM

## 2022-06-14 DIAGNOSIS — R60.0 BILATERAL LOWER EXTREMITY EDEMA: ICD-10-CM

## 2022-06-14 LAB
AORTIC ROOT: 3 CM
APICAL FOUR CHAMBER EJECTION FRACTION: 59 %
E WAVE DECELERATION TIME: 135 MS
FRACTIONAL SHORTENING: 33 % (ref 28–44)
INTERVENTRICULAR SEPTUM IN DIASTOLE (PARASTERNAL SHORT AXIS VIEW): 0.7 CM
INTERVENTRICULAR SEPTUM: 0.7 CM (ref 0.6–1.1)
LAAS-AP2: 18.3 CM2
LAAS-AP4: 16.6 CM2
LEFT ATRIUM AREA SYSTOLE SINGLE PLANE A4C: 16.4 CM2
LEFT ATRIUM SIZE: 3.8 CM
LEFT INTERNAL DIMENSION IN SYSTOLE: 3.2 CM (ref 2.1–4)
LEFT VENTRICLE DIASTOLIC VOLUME (MOD BIPLANE): 104 ML
LEFT VENTRICLE SYSTOLIC VOLUME (MOD BIPLANE): 49 ML
LEFT VENTRICULAR INTERNAL DIMENSION IN DIASTOLE: 4.8 CM (ref 3.5–6)
LEFT VENTRICULAR POSTERIOR WALL IN END DIASTOLE: 0.7 CM
LEFT VENTRICULAR STROKE VOLUME: 68 ML
LV EF: 53 %
LVSV (TEICH): 68 ML
MV E'TISSUE VEL-SEP: 11 CM/S
MV PEAK A VEL: 0.8 M/S
MV PEAK E VEL: 87 CM/S
MV STENOSIS PRESSURE HALF TIME: 39 MS
MV VALVE AREA P 1/2 METHOD: 5.64 CM2
RIGHT ATRIUM AREA SYSTOLE A4C: 14 CM2
RIGHT VENTRICLE ID DIMENSION: 2.1 CM
SL CV LEFT ATRIUM LENGTH A2C: 5.2 CM
SL CV LV EF: 55
SL CV PED ECHO LEFT VENTRICLE DIASTOLIC VOLUME (MOD BIPLANE) 2D: 109 ML
SL CV PED ECHO LEFT VENTRICLE SYSTOLIC VOLUME (MOD BIPLANE) 2D: 40 ML

## 2022-06-14 PROCEDURE — 93970 EXTREMITY STUDY: CPT

## 2022-06-14 PROCEDURE — 93970 EXTREMITY STUDY: CPT | Performed by: SURGERY

## 2022-06-14 PROCEDURE — 93306 TTE W/DOPPLER COMPLETE: CPT

## 2022-06-14 PROCEDURE — 93306 TTE W/DOPPLER COMPLETE: CPT | Performed by: INTERNAL MEDICINE

## 2022-06-29 ENCOUNTER — TELEPHONE (OUTPATIENT)
Dept: FAMILY MEDICINE CLINIC | Facility: CLINIC | Age: 57
End: 2022-06-29

## 2022-06-29 NOTE — TELEPHONE ENCOUNTER
Karyn Gonsalo would like to know the results of her venous duplex study and her ECG  She received results but does not understand what it means    Please advise  [phone]

## 2022-10-06 ENCOUNTER — OFFICE VISIT (OUTPATIENT)
Dept: URGENT CARE | Facility: CLINIC | Age: 57
End: 2022-10-06
Payer: COMMERCIAL

## 2022-10-06 VITALS
RESPIRATION RATE: 18 BRPM | DIASTOLIC BLOOD PRESSURE: 78 MMHG | OXYGEN SATURATION: 98 % | TEMPERATURE: 99.5 F | HEART RATE: 84 BPM | SYSTOLIC BLOOD PRESSURE: 126 MMHG

## 2022-10-06 DIAGNOSIS — B34.9 VIRAL SYNDROME: Primary | ICD-10-CM

## 2022-10-06 PROCEDURE — 99213 OFFICE O/P EST LOW 20 MIN: CPT | Performed by: PHYSICIAN ASSISTANT

## 2022-10-06 RX ORDER — METHYLPREDNISOLONE 4 MG/1
TABLET ORAL
Qty: 21 TABLET | Refills: 0 | Status: SHIPPED | OUTPATIENT
Start: 2022-10-06

## 2022-10-06 RX ORDER — AZITHROMYCIN 250 MG/1
TABLET, FILM COATED ORAL
Qty: 6 TABLET | Refills: 0 | Status: SHIPPED | OUTPATIENT
Start: 2022-10-06 | End: 2022-10-10

## 2022-10-06 RX ORDER — ALBUTEROL SULFATE 90 UG/1
2 AEROSOL, METERED RESPIRATORY (INHALATION) EVERY 6 HOURS PRN
Qty: 18 G | Refills: 0 | Status: SHIPPED | OUTPATIENT
Start: 2022-10-06

## 2022-10-06 NOTE — PATIENT INSTRUCTIONS
Acute Bronchitis   AMBULATORY CARE:   Acute bronchitis  is swelling and irritation in your lungs  It is usually caused by a virus and most often happens in the winter  Bronchitis may also be caused by bacteria or by a chemical irritant, such as smoke  Common symptoms include the following:   Cough that lasts up to 3 weeks, stuffy nose    Hoarseness, sore throat    A fever and chills    Feeling more tired than usual, and body aches    Wheezing or pain when you breathe or cough    Seek care immediately if:   You cough up blood  Your lips or fingernails turn blue  You feel like you are not getting enough air when you breathe  Call your doctor if:   Your symptoms do not go away or get worse, even after treatment  Your cough does not get better within 4 weeks  You have questions or concerns about your condition or care  Medicines: You may  need any of the following:  Cough suppressants  decrease your urge to cough  Decongestants  help loosen mucus in your lungs and make it easier to cough up  This can help you breathe easier  Inhalers  may be given  Your healthcare provider may give you one or more inhalers to help you breathe easier and cough less  An inhaler gives your medicine to open your airways  Ask your healthcare provider to show you how to use your inhaler correctly  Antibiotics  may be given for up to 5 days if your bronchitis is caused by bacteria  Acetaminophen  decreases pain and fever  It is available without a doctor's order  Ask how much to take and how often to take it  Follow directions  Read the labels of all other medicines you are using to see if they also contain acetaminophen, or ask your doctor or pharmacist  Acetaminophen can cause liver damage if not taken correctly  Do not use more than 4 grams (4,000 milligrams) total of acetaminophen in one day  NSAIDs  help decrease swelling and pain or fever   This medicine is available with or without a doctor's order  NSAIDs can cause stomach bleeding or kidney problems in certain people  If you take blood thinner medicine, always ask your healthcare provider if NSAIDs are safe for you  Always read the medicine label and follow directions  Take your medicine as directed  Contact your healthcare provider if you think your medicine is not helping or if you have side effects  Tell him of her if you are allergic to any medicine  Keep a list of the medicines, vitamins, and herbs you take  Include the amounts, and when and why you take them  Bring the list or the pill bottles to follow-up visits  Carry your medicine list with you in case of an emergency  Self-care:   Drink liquids as directed  You may need to drink more liquids than usual to stay hydrated  Ask how much liquid to drink each day and which liquids are best for you  Use a cool mist humidifier  to increase air moisture in your home  This may make it easier for you to breathe and help decrease your cough  Get more rest   Rest helps your body to heal  Slowly start to do more each day  Rest when you feel it is needed  Avoid irritants in the air  Avoid chemicals, fumes, and dust  Wear a face mask if you must work around dust or fumes  Stay inside on days when air pollution levels are high  If you have allergies, stay inside when pollen counts are high  Do not use aerosol products, such as spray-on deodorant, bug spray, and hair spray  Do not smoke or be around others who are smoking  Nicotine and other chemicals in cigarettes and cigars can cause lung damage  Ask your healthcare provider for information if you currently smoke and need help to quit  E-cigarettes or smokeless tobacco still contain nicotine  Talk to your healthcare provider before you use these products  Prevent acute bronchitis:       Ask about vaccines you may need  Get a flu vaccine each year as soon as recommended, usually in September or October   Ask your healthcare provider if you should also get a pneumonia or COVID-19 vaccine  Your healthcare provider can tell you if you should also get other vaccines, and when to get them  Prevent the spread of germs  You can decrease your risk for acute bronchitis and other illnesses by doing the following:    Wash your hands often with soap and water  Carry germ-killing hand lotion or gel with you  You can use the lotion or gel to clean your hands when soap and water are not available  Do not touch your eyes, nose, or mouth unless you have washed your hands first     Always cover your mouth when you cough to prevent the spread of germs  It is best to cough into a tissue or your shirt sleeve instead of into your hand  Ask those around you to cover their mouths when they cough  Try to avoid people who have a cold or the flu  If you are sick, stay away from others as much as possible  Follow up with your doctor as directed:  Write down questions you have so you will remember to ask them during your follow-up visits  © Copyright [employer] 2022 Information is for End User's use only and may not be sold, redistributed or otherwise used for commercial purposes  All illustrations and images included in CareNotes® are the copyrighted property of A D A M [employer] Inc  or [employer]   The above information is an  only  It is not intended as medical advice for individual conditions or treatments  Talk to your doctor, nurse or pharmacist before following any medical regimen to see if it is safe and effective for you  Viral Syndrome   AMBULATORY CARE:   Viral syndrome  is a term used for symptoms of an infection caused by a virus  Viruses are spread easily from person to person through the air and on shared items  Signs and symptoms  may start slowly or suddenly and last hours to days  They can be mild to severe and can change over days or hours   You may have any of the following:  Fever and chills    A runny or stuffy nose    Cough, sore throat, or hoarseness    Headache, or pain and pressure around your eyes    Muscle aches and joint pain    Shortness of breath or wheezing    Abdominal pain, cramps, and diarrhea    Nausea, vomiting, or loss of appetite    Call your local emergency number (911 in the [facility] [facility]) or have someone else call if:   You have a seizure  You cannot be woken  You have chest pain or trouble breathing  Seek care immediately if:   You have a stiff neck, a bad headache, and sensitivity to light  You feel weak, dizzy, or confused  You stop urinating or urinate a lot less than usual     You cough up blood or thick yellow or green mucus  You have severe abdominal pain or your abdomen is larger than usual     Call your doctor if:   Your symptoms do not get better with treatment or get worse after 3 days  You have a rash or ear pain  You have burning when you urinate  You have questions or concerns about your condition or care  Treatment for viral syndrome  may include medicines to manage your symptoms  Antibiotics are not given for a viral infection  You may  need any of the following:  Acetaminophen  decreases pain and fever  It is available without a doctor's order  Ask how much to take and how often to take it  Follow directions  Read the labels of all other medicines you are using to see if they also contain acetaminophen, or ask your doctor or pharmacist  Acetaminophen can cause liver damage if not taken correctly  Do not use more than 4 grams (4,000 milligrams) total of acetaminophen in one day  NSAIDs , such as ibuprofen, help decrease swelling, pain, and fever  NSAIDs can cause stomach bleeding or kidney problems in certain people  If you take blood thinner medicine, always ask your healthcare provider if NSAIDs are safe for you  Always read the medicine label and follow directions      Cold medicine  helps decrease swelling, control a cough, and relieve chest or nasal congestion  Saline nasal spray  helps decrease nasal congestion  Manage your symptoms:   Drink liquids as directed to prevent dehydration  Ask how much liquid to drink each day and which liquids are best for you  Ask if you should drink an oral rehydration solution (ORS)  An ORS has the right amounts of water, salts, and sugar you need to replace body fluids  This may help prevent dehydration caused by vomiting or diarrhea  Do not drink liquids with caffeine  Liquids with caffeine can make dehydration worse  Get plenty of rest to help your body heal   Take naps throughout the day  Ask your healthcare provider when you can return to work and your normal activities  Use a cool mist humidifier to help you breathe easier  Ask your healthcare provider how to use a cool mist humidifier  Eat honey or use cough drops for a sore throat  Cough drops are available without a doctor's order  Follow directions for taking cough drops  Do not smoke or be close to anyone who is smoking  Nicotine and other chemicals in cigarettes and cigars can cause lung damage  Smoking can also delay healing  Ask your healthcare provider for information if you currently smoke and need help to quit  E-cigarettes or smokeless tobacco still contain nicotine  Talk to your healthcare provider before you use these products  Prevent the spread of germs:       Wash your hands often  Wash your hands several times each day  Wash after you use the bathroom, change a child's diaper, and before you prepare or eat food  Use soap and water every time  Rub your soapy hands together, lacing your fingers  Wash the front and back of your hands, and in between your fingers  Use the fingers of one hand to scrub under the fingernails of the other hand  Wash for at least 20 seconds  Rinse with warm, running water for several seconds  Then dry your hands with a clean towel or paper towel   Use hand  that contains alcohol if soap and water are not available  Do not touch your eyes, nose, or mouth without washing your hands first          Cover a sneeze or cough  Use a tissue that covers your mouth and nose  Throw the tissue away in a trash can right away  Use the bend of your arm if a tissue is not available  Wash your hands well with soap and water or use a hand   Stay away from others while you are sick  Avoid crowds as much as possible  Ask about vaccines you may need  Talk to your healthcare provider about your vaccine history  He or she will tell you which vaccines you need, and when to get them  Get the influenza (flu) vaccine as soon as recommended each year  The flu vaccine is available starting in September or October  Flu viruses change, so it is important to get a flu vaccine every year  Get the pneumonia vaccine if recommended  This vaccine is usually recommended every 5 years  Your provider will tell you when to get this vaccine, if needed  Follow up with your doctor as directed:  Write down your questions so you remember to ask them during your visits  © Copyright [employer] 2022 Information is for End User's use only and may not be sold, redistributed or otherwise used for commercial purposes  All illustrations and images included in CareNotes® are the copyrighted property of [employer]  or Ludin Brisen[employer]   The above information is an  only  It is not intended as medical advice for individual conditions or treatments  Talk to your doctor, nurse or pharmacist before following any medical regimen to see if it is safe and effective for you

## 2022-11-14 NOTE — PROGRESS NOTES
[address] [facility]        NAME: Lesly Lamb is a 65 y o  female  : 1965    MRN: [id number]  DATE: 2022  TIME: 6:41 AM    Assessment and Plan   Viral syndrome [B34 9]  1  Viral syndrome  methylPREDNISolone 4 MG tablet therapy pack    albuterol (Ventolin HFA) 90 mcg/act inhaler    azithromycin (ZITHROMAX) 250 mg tablet         Patient Instructions       Follow up with PCP in 3-5 days  Proceed to  ER if symptoms worsen  Chief Complaint     Chief Complaint   Patient presents with   • Cough     Began three days ago  Negative home COVID test yesterday  Fever (101 6)  burning with productive cough  History of Present Illness       52-year-old female presents the clinic with cough and fever that started 3 days ago  Patient states that she has had T-max of 101 6° and notes a burning with her productive cough  Patient states that she took a home COVID test which was negative  Review of Systems   Review of Systems   Constitutional: Positive for fever  Negative for unexpected weight change  HENT: Negative for congestion, ear pain and rhinorrhea  Respiratory: Positive for cough  Negative for chest tightness and wheezing  Skin: Negative for rash  Neurological: Negative for dizziness, light-headedness and headaches  All other systems reviewed and are negative          Current Medications       Current Outpatient Medications:   •  albuterol (Ventolin HFA) 90 mcg/act inhaler, Inhale 2 puffs every 6 (six) hours as needed for wheezing, Disp: 18 g, Rfl: 0  •  atorvastatin (LIPITOR) 20 mg tablet, TAKE 1 TABLET BY MOUTH EVERY DAY, Disp: 90 tablet, Rfl: 1  •  Coenzyme Q10 (CO Q 10 PO), Take by mouth daily, Disp: , Rfl:   •  levothyroxine 75 mcg tablet, TAKE 1 TABLET BY MOUTH EVERY DAY, Disp: 90 tablet, Rfl: 1  •  loratadine (CLARITIN) 10 mg tablet, Take 10 mg by mouth daily, Disp: , Rfl:   •  methylPREDNISolone 4 MG tablet therapy pack, Use as directed on package, Disp: 21 tablet, Rfl: 0  •  Multiple Vitamin (MULTIVITAMIN ADULT PO), Take by mouth daily, Disp: , Rfl:   •  Omega-3 Fatty Acids (FISH OIL PO), Take by mouth daily, Disp: , Rfl:     Current Allergies     Allergies as of 10/06/2022 - Reviewed 10/06/2022   Allergen Reaction Noted   • Molds & smuts Other (See Comments) 2021            The following portions of the patient's history were reviewed and updated as appropriate: allergies, current medications, past family history, past medical history, past social history, past surgical history and problem list      Past Medical History:   Diagnosis Date   • Basal cell carcinoma     Per patient , had a BCC on her left upper arm 6 years ago that was removed by the MOHS procedure    • Disease of thyroid gland    • Herpes zoster     Right, at the T6 dermatome  last assessed: 13   • Hypercholesteremia        Past Surgical History:   Procedure Laterality Date   •  SECTION     • CHOLECYSTECTOMY     • MOHS SURGERY      Per patient, had the MOHS procedure for a BCC left upper arm 6 years ago   • [address] [facility] Left 2019       Family History   Problem Relation Age of Onset   • Diabetes Father    • Diabetes Sister    • Thyroid disease Sister         Disorder   • Autoimmune disease Brother         Disorder   • Diabetes Brother    • Thyroid disease Brother    • Breast cancer Maternal Aunt          Medications have been verified  Objective   /78   Pulse 84   Temp 99 5 °F (37 5 °C)   Resp 18   SpO2 98%   No LMP recorded  Patient is premenopausal        Physical Exam     Physical Exam  Vitals and nursing note reviewed  Constitutional:       General: She is not in acute distress  Appearance: She is well-developed  She is not diaphoretic  HENT:      Head: Normocephalic and atraumatic  Right Ear: Tympanic membrane normal       Left Ear: Tympanic membrane normal       Nose: Mucosal edema and rhinorrhea present  Mouth/Throat:      Pharynx: Uvula midline  Posterior oropharyngeal erythema (PND) present  Cardiovascular:      Rate and Rhythm: Normal rate and regular rhythm  Pulmonary:      Effort: Pulmonary effort is normal       Breath sounds: Normal breath sounds  Musculoskeletal:         General: Normal range of motion  Skin:     General: Skin is warm and dry  Findings: No rash  Neurological:      Mental Status: She is alert and oriented to person, place, and time

## 2022-12-10 DIAGNOSIS — E78.2 MIXED HYPERLIPIDEMIA: ICD-10-CM

## 2022-12-10 DIAGNOSIS — E03.9 ACQUIRED HYPOTHYROIDISM: ICD-10-CM

## 2022-12-10 RX ORDER — LEVOTHYROXINE SODIUM 0.07 MG/1
TABLET ORAL
Qty: 90 TABLET | Refills: 1 | Status: SHIPPED | OUTPATIENT
Start: 2022-12-10

## 2022-12-10 RX ORDER — ATORVASTATIN CALCIUM 20 MG/1
TABLET, FILM COATED ORAL
Qty: 90 TABLET | Refills: 1 | Status: SHIPPED | OUTPATIENT
Start: 2022-12-10

## 2023-06-07 DIAGNOSIS — E78.2 MIXED HYPERLIPIDEMIA: ICD-10-CM

## 2023-06-07 DIAGNOSIS — E03.9 ACQUIRED HYPOTHYROIDISM: ICD-10-CM

## 2023-06-07 RX ORDER — ATORVASTATIN CALCIUM 20 MG/1
20 TABLET, FILM COATED ORAL DAILY
Qty: 90 TABLET | Refills: 0 | Status: SHIPPED | OUTPATIENT
Start: 2023-06-07

## 2023-06-07 RX ORDER — LEVOTHYROXINE SODIUM 0.07 MG/1
75 TABLET ORAL DAILY
Qty: 90 TABLET | Refills: 0 | Status: SHIPPED | OUTPATIENT
Start: 2023-06-07

## 2023-06-07 RX ORDER — ATORVASTATIN CALCIUM 20 MG/1
TABLET, FILM COATED ORAL
Qty: 90 TABLET | Refills: 1 | OUTPATIENT
Start: 2023-06-07

## 2023-06-07 RX ORDER — LEVOTHYROXINE SODIUM 0.07 MG/1
TABLET ORAL
Qty: 90 TABLET | Refills: 1 | OUTPATIENT
Start: 2023-06-07

## 2023-07-20 NOTE — DISCHARGE INSTR - OTHER ORDERS
POST LARGE CORE BREAST BIOPSY PATIENT INFORMATION      1  Place an ice pack inside your bra over the top of the dressing every hour for 20 minutes (20 minutes on, 60 minutes off)  Do this until bedtime  2  Do not shower or bathe until the following morning  3  You may bathe your breast carefully with the steri-strips in place  Be careful    Not to loosen them  The steri-strips will fall off in 3-5 days  4  You may have mild discomfort, and you may have some bruising where the   Needle entered the skin  This should clear within 5-7 days  5  If you need medicine for discomfort, take acetaminophen products such as   Tylenol  You may also take Advil or Motrin products  6  Do not participate in strenuous activities such as-tennis, aerobics, skiing,  Weight lifting, etc  for 24 hours  Refrain from swimming/soaking for 72 hours  7  Wearing a bra for sleeping may be more comfortable for the first 24-48 hours  8  Watch for continued bleeding, pain or fever over 101; please call with any questions or concerns  For procedures done at the [facility]  [facility] call:  Pancho Westbrook RN at [phone]  Melanie Cueto RN at [phone]                    *After 4 PM call the Interventional Radiology Department                    [phone] and ask to speak with the nurse on call  For procedures done at the [address] [place] [address] call:         Cristal Arenas RN at   *After 4 PM call the Interventional Radiology Department   [phone] and ask to speak with the nurse on call  For procedures done at [facility] call: The Radiology Nurse at [phone]  *After 4 PM call your physician, or go to the Emergency Department  9          The final results of your biopsy are usually available within one week 
[id number]

## 2023-07-25 ENCOUNTER — OFFICE VISIT (OUTPATIENT)
Dept: FAMILY MEDICINE CLINIC | Facility: CLINIC | Age: 58
End: 2023-07-25
Payer: COMMERCIAL

## 2023-07-25 VITALS
OXYGEN SATURATION: 98 % | HEART RATE: 87 BPM | DIASTOLIC BLOOD PRESSURE: 84 MMHG | BODY MASS INDEX: 47.02 KG/M2 | HEIGHT: 64 IN | TEMPERATURE: 97.9 F | RESPIRATION RATE: 18 BRPM | SYSTOLIC BLOOD PRESSURE: 126 MMHG | WEIGHT: 275.4 LBS

## 2023-07-25 DIAGNOSIS — M25.561 CHRONIC PAIN OF BOTH KNEES: ICD-10-CM

## 2023-07-25 DIAGNOSIS — Z12.11 SCREENING FOR COLON CANCER: ICD-10-CM

## 2023-07-25 DIAGNOSIS — M85.89 OTHER SPECIFIED DISORDERS OF BONE DENSITY AND STRUCTURE, MULTIPLE SITES: ICD-10-CM

## 2023-07-25 DIAGNOSIS — M25.511 ACUTE PAIN OF RIGHT SHOULDER: ICD-10-CM

## 2023-07-25 DIAGNOSIS — E03.9 HYPOTHYROIDISM, UNSPECIFIED TYPE: ICD-10-CM

## 2023-07-25 DIAGNOSIS — R73.03 PREDIABETES: ICD-10-CM

## 2023-07-25 DIAGNOSIS — G89.29 CHRONIC PAIN OF BOTH KNEES: ICD-10-CM

## 2023-07-25 DIAGNOSIS — M25.562 CHRONIC PAIN OF BOTH KNEES: ICD-10-CM

## 2023-07-25 DIAGNOSIS — Z13.820 SCREENING FOR OSTEOPOROSIS: ICD-10-CM

## 2023-07-25 DIAGNOSIS — M25.50 POLYARTHRALGIA: ICD-10-CM

## 2023-07-25 DIAGNOSIS — M17.0 PRIMARY OSTEOARTHRITIS OF BOTH KNEES: ICD-10-CM

## 2023-07-25 DIAGNOSIS — E78.2 MIXED HYPERLIPIDEMIA: Primary | ICD-10-CM

## 2023-07-25 DIAGNOSIS — E03.9 ACQUIRED HYPOTHYROIDISM: ICD-10-CM

## 2023-07-25 DIAGNOSIS — Z12.31 ENCOUNTER FOR SCREENING MAMMOGRAM FOR BREAST CANCER: ICD-10-CM

## 2023-07-25 DIAGNOSIS — E55.9 VITAMIN D DEFICIENCY: ICD-10-CM

## 2023-07-25 PROBLEM — F41.8 DEPRESSION WITH ANXIETY: Status: RESOLVED | Noted: 2017-08-19 | Resolved: 2023-07-25

## 2023-07-25 PROBLEM — S63.501A RIGHT WRIST SPRAIN, INITIAL ENCOUNTER: Status: RESOLVED | Noted: 2022-03-22 | Resolved: 2023-07-25

## 2023-07-25 PROBLEM — R63.1 EXCESSIVE THIRST: Status: RESOLVED | Noted: 2017-08-19 | Resolved: 2023-07-25

## 2023-07-25 PROBLEM — D50.9 IRON DEFICIENCY ANEMIA: Status: RESOLVED | Noted: 2017-08-25 | Resolved: 2023-07-25

## 2023-07-25 PROBLEM — M79.643 CHRONIC HAND PAIN: Status: RESOLVED | Noted: 2017-10-24 | Resolved: 2023-07-25

## 2023-07-25 PROCEDURE — 99214 OFFICE O/P EST MOD 30 MIN: CPT | Performed by: FAMILY MEDICINE

## 2023-07-25 PROCEDURE — 3725F SCREEN DEPRESSION PERFORMED: CPT | Performed by: FAMILY MEDICINE

## 2023-07-25 RX ORDER — ATORVASTATIN CALCIUM 20 MG/1
20 TABLET, FILM COATED ORAL DAILY
Qty: 90 TABLET | Refills: 1 | Status: SHIPPED | OUTPATIENT
Start: 2023-07-25

## 2023-07-25 RX ORDER — LEVOTHYROXINE SODIUM 0.07 MG/1
75 TABLET ORAL DAILY
Qty: 90 TABLET | Refills: 1 | Status: SHIPPED | OUTPATIENT
Start: 2023-07-25

## 2023-07-25 NOTE — PROGRESS NOTES
Assessment/Plan:  Problem List Items Addressed This Visit        Other    Hyperlipidemia - Primary    Relevant Medications    atorvastatin (LIPITOR) 20 mg tablet    Other Relevant Orders    Comprehensive metabolic panel    LDL cholesterol, direct    Lipid panel    Knee pain, bilateral    Relevant Orders    XR knee 3 vw right non injury    XR knee 3 vw left non injury    Ambulatory Referral to Orthopedic Surgery    Polyarthralgia    Relevant Orders    T4, free    TSH, 3rd generation    Comprehensive metabolic panel    CBC and differential    Rheumatoid Arthritis Factor    Lyme Disease Serology w/Reflex    AMADOU Comprehensive Panel    Sedimentation rate, automated    Vitamin D 25 hydroxy    C-reactive protein    Prediabetes     The patient will continue to limit her carbohydrate intake and will increase her exercise. We will send her for the testing is ordered and she will follow-up with the results when available. Relevant Orders    Comprehensive metabolic panel    UA (URINE) with reflex to Scope    Hemoglobin A1C    Vitamin D deficiency     We will check an up-to-date vitamin D level as indicated and we will follow-up with results.          Relevant Orders    Vitamin D 25 hydroxy   Other Visit Diagnoses     Encounter for screening mammogram for breast cancer        Relevant Orders    Mammo screening bilateral w 3d & cad    Other specified disorders of bone density and structure, multiple sites        Relevant Orders    DXA bone density spine hip and pelvis    Screening for osteoporosis        Relevant Orders    DXA bone density spine hip and pelvis    Screening for colon cancer        Relevant Orders    Cologuard    Primary osteoarthritis of both knees        Relevant Orders    XR knee 3 vw right non injury    XR knee 3 vw left non injury    Ambulatory Referral to Orthopedic Surgery    Acute pain of right shoulder        Relevant Orders    XR shoulder 2+ vw right    Ambulatory Referral to Orthopedic Surgery Hypothyroidism, unspecified type        Relevant Medications    levothyroxine 75 mcg tablet    Other Relevant Orders    T4, free    TSH, 3rd generation    Comprehensive metabolic panel    Acquired hypothyroidism        Relevant Medications    levothyroxine 75 mcg tablet      The patient will go for the testing is ordered to evaluate her polyarthralgias and other symptoms. We will follow-up closely with the results and will follow-up as scheduled. She will continue with her current medications. We will see her back as scheduled. She is going to look into weight watchers to help with her weight loss. Return in about 1 month (around 8/25/2023) for Recheck. I spent 15 minutes during the visit reviewing the history from the patient, performing the examination, discussing the findings with the patient, providing counseling and education, and making a plan. I spent 15 minutes ordering referrals and testing and documenting. Subjective:   Chief Complaint   Patient presents with   • Medication Management        Patient ID: Raven Leavitt is a 63 y.o. female presents today for routine checkup. Raven Leavitt is a 63 y.o. female who presents today for follow-up of her hyperlipidemia and hypothyroidism and she is due for lab work. She is complaining of diffuse joint pain and difficulty losing weight. She has been trying to follow diet plan and has been unsuccessful. She complains of pain in all of her joints. She is not tracking her food. She is taking her medication. The patient denies any chest pain, shortness of breath, or palpitations. There is no edema. There are no headaches or visual changes. There is no lightheadedness, dizziness, or fainting spells. She is complaining of feeling pain all the time with walking. The patient currently denies any nausea, vomiting, or GERD symptoms. she has normal bowel movements and normal urine output. she has a normal appetite.   There is pain and swelling in her kness-  She has OA in both knees. There is pain in the right shoulder for 2-3 months- there was no injury- she strained it moving a weighted blanket. It aches into her arm      The following portions of the patient's history were reviewed and updated as appropriate: allergies, current medications, past family history, past medical history, past social history, past surgical history and problem list.  Patient Active Problem List   Diagnosis   • Esophagitis, reflux   • Hyperlipidemia   • Knee pain, bilateral   • Polyarthralgia   • Vitamin D deficiency   • Allergic rhinitis   • Neoplasm of skin   • Prediabetes     Past Medical History:   Diagnosis Date   • Arthritis    • Basal cell carcinoma     Per patient , had a BCC on her left upper arm 6 years ago that was removed by the MOHS procedure    • Disease of thyroid gland    • Herpes zoster     Right, at the T6 dermatome.  last assessed: 13   • Hypercholesteremia    • Iron deficiency anemia 2017   • Obesity      Past Surgical History:   Procedure Laterality Date   •  SECTION     • CHOLECYSTECTOMY     • MOHS SURGERY      Per patient, had the MOHS procedure for a BCC left upper arm 6 years ago   • [facility] Left 2019     Allergies   Allergen Reactions   • Molds & Smuts Other (See Comments)     congestion     Family History   Problem Relation Age of Onset   • Diabetes Father    • Diabetes Sister    • Thyroid disease Sister         Disorder   • Autoimmune disease Brother         Disorder   • Diabetes Brother    • Thyroid disease Brother    • Breast cancer Maternal Aunt    • Dementia Mother      Social History     Socioeconomic History   • Marital status: Single     Spouse name: Not on file   • Number of children: 1   • Years of education: Not on file   • Highest education level: Not on file   Occupational History   • Not on file   Tobacco Use   • Smoking status: Never     Passive exposure: Past   • Smokeless tobacco: Never   Vaping Use   • Vaping Use: Never used   Substance and Sexual Activity   • Alcohol use: No   • Drug use: No   • Sexual activity: Not Currently     Birth control/protection: Post-menopausal   Other Topics Concern   • Not on file   Social History Narrative   • Not on file     Social Determinants of Health     Financial Resource Strain: Low Risk  (3/17/2021)    Overall Financial Resource Strain (CARDIA)    • Difficulty of Paying Living Expenses: Not hard at all   Food Insecurity: No Food Insecurity (3/17/2021)    Hunger Vital Sign    • Worried About Running Out of Food in the Last Year: Never true    • Ran Out of Food in the Last Year: Never true   Transportation Needs: No Transportation Needs (3/17/2021)    PRAPARE - Transportation    • Lack of Transportation (Medical): No    • Lack of Transportation (Non-Medical): No   Physical Activity: Inactive (3/17/2021)    Exercise Vital Sign    • Days of Exercise per Week: 0 days    • Minutes of Exercise per Session: 0 min   Stress: Stress Concern Present (3/17/2021)    [facility]    • Feeling of Stress : To some extent   Social Connections: Socially Isolated (4/1/2021)    Social Connection and Isolation Panel [NHANES]    • Frequency of Communication with Friends and Family:  Three times a week    • Frequency of Social Gatherings with Friends and Family: Never    • Attends Caodaism Services: Never    • Active Member of Clubs or Organizations: No    • Attends Club or Organization Meetings: Never    • Marital Status: Never    Intimate Partner Violence: Not At Risk (7/25/2023)    Humiliation, Afraid, Rape, and Kick questionnaire    • Fear of Current or Ex-Partner: No    • Emotionally Abused: No    • Physically Abused: No    • Sexually Abused: No   Housing Stability: Not on file     Current Outpatient Medications on File Prior to Visit   Medication Sig Dispense Refill   • Coenzyme Q10 (CO Q 10 PO) Take by mouth daily     • loratadine (CLARITIN) 10 mg tablet Take 10 mg by mouth daily     • Multiple Vitamin (MULTIVITAMIN ADULT PO) Take by mouth daily     • Omega-3 Fatty Acids (FISH OIL PO) Take by mouth daily       No current facility-administered medications on file prior to visit. Review of Systems   Constitutional: Negative. HENT: Negative. Eyes: Negative. Respiratory: Negative. Cardiovascular: Negative. Gastrointestinal: Negative. Endocrine: Negative. Genitourinary: Negative. Musculoskeletal: Negative. Skin: Negative. Allergic/Immunologic: Negative. Neurological: Negative. Hematological: Negative. Psychiatric/Behavioral: Negative. Objective:  Vitals:    07/25/23 1802   BP: 126/84   BP Location: Left arm   Patient Position: Sitting   Cuff Size: Large   Pulse: 87   Resp: 18   Temp: 97.9 °F (36.6 °C)   SpO2: 98%   Weight: 125 kg (275 lb 6.4 oz)   Height: 5' 4" (1.626 m)     Body mass index is 47.27 kg/m². Physical Exam  Constitutional:       Appearance: She is well-developed. HENT:      Head: Normocephalic and atraumatic. Mouth/Throat:      Pharynx: No oropharyngeal exudate. Eyes:      Conjunctiva/sclera: Conjunctivae normal.      Pupils: Pupils are equal, round, and reactive to light. Neck:      Thyroid: No thyromegaly. Vascular: No JVD. Trachea: No tracheal deviation. Cardiovascular:      Rate and Rhythm: Normal rate and regular rhythm. Heart sounds: Normal heart sounds. No murmur heard. No friction rub. No gallop. Pulmonary:      Effort: Pulmonary effort is normal. No respiratory distress. Breath sounds: Normal breath sounds. No stridor. No wheezing or rales. Chest:      Chest wall: No tenderness. Abdominal:      General: Bowel sounds are normal. There is no distension. Palpations: Abdomen is soft. There is no mass. Tenderness: There is no abdominal tenderness. There is no guarding or rebound. Musculoskeletal:         General: No tenderness or deformity. Normal range of motion. Cervical back: Normal range of motion. Lymphadenopathy:      Cervical: No cervical adenopathy. Skin:     General: Skin is warm and dry. Neurological:      Mental Status: She is alert and oriented to person, place, and time. Cranial Nerves: No cranial nerve deficit. Motor: No abnormal muscle tone. Coordination: Coordination normal.      Deep Tendon Reflexes: Reflexes are normal and symmetric. Reflexes normal.         No visits with results within 2 Month(s) from this visit. Latest known visit with results is:   Hospital Outpatient Visit on 06/14/2022   Component Date Value   • LA size 06/14/2022 3.8    • LVPWd 06/14/2022 0.70    • Left Atrium Area-systoli* 06/14/2022 18.3    • Left Atrium Area-systoli* 06/14/2022 16.6    • MV E' Tissue Velocity Se* 06/14/2022 11    • IVSd 06/14/2022 7.32    • LV DIASTOLIC VOLUME (MOD* 73/07/3449 109    • LEFT VENTRICLE SYSTOLIC * 04/82/6917 40    • Left ventricular stroke * 06/14/2022 68.00    • EF 06/14/2022 53    • A4C EF 06/14/2022 59    • LA length (A2C) 06/14/2022 5.20    • LVIDd 06/14/2022 4.80    • IVS 06/14/2022 0.7    • LVIDS 06/14/2022 3.20    • FS 06/14/2022 33    • Ao root 06/14/2022 3.00    • RVID d 06/14/2022 2.1    • MV valve area p 1/2 meth* 06/14/2022 5.64    • E wave deceleration time 06/14/2022 135    • MV Peak E Guille 06/14/2022 87    • MV Peak A Guille 06/14/2022 0.8    • LV Systolic Volume (BP) 95/09/4941 49    • LV Diastolic Volume (BP) 30/47/3594 104    • ANA A4C 06/14/2022 16.4    • RAA A4C 06/14/2022 14    • MV stenosis pressure 1/2* 06/14/2022 39    • LVSV, 2D 06/14/2022 68    • LV EF 06/14/2022 55       BMI Counseling: Body mass index is 47.27 kg/m².  The BMI is above normal. Nutrition recommendations include decreasing portion sizes, encouraging healthy choices of fruits and vegetables, decreasing fast food intake, consuming healthier snacks, limiting drinks that contain sugar, moderation in carbohydrate intake, increasing intake of lean protein, reducing intake of saturated and trans fat and reducing intake of cholesterol. Exercise recommendations include exercising 3-5 times per week and strength training exercises. No pharmacotherapy was ordered. Patient referred to PCP. Rationale for BMI follow-up plan is due to patient being overweight or obese. Depression Screening and Follow-up Plan: Patient was screened for depression during today's encounter. They screened negative with a PHQ-2 score of 0.

## 2023-07-30 NOTE — ASSESSMENT & PLAN NOTE
The patient will continue to limit her carbohydrate intake and will increase her exercise. We will send her for the testing is ordered and she will follow-up with the results when available.

## 2023-07-31 ENCOUNTER — APPOINTMENT (OUTPATIENT)
Dept: RADIOLOGY | Facility: CLINIC | Age: 58
End: 2023-07-31
Payer: COMMERCIAL

## 2023-07-31 DIAGNOSIS — M25.562 CHRONIC PAIN OF BOTH KNEES: ICD-10-CM

## 2023-07-31 DIAGNOSIS — M25.511 ACUTE PAIN OF RIGHT SHOULDER: ICD-10-CM

## 2023-07-31 DIAGNOSIS — M25.561 CHRONIC PAIN OF BOTH KNEES: ICD-10-CM

## 2023-07-31 DIAGNOSIS — M17.0 PRIMARY OSTEOARTHRITIS OF BOTH KNEES: ICD-10-CM

## 2023-07-31 DIAGNOSIS — G89.29 CHRONIC PAIN OF BOTH KNEES: ICD-10-CM

## 2023-07-31 PROCEDURE — 73030 X-RAY EXAM OF SHOULDER: CPT

## 2023-07-31 PROCEDURE — 73562 X-RAY EXAM OF KNEE 3: CPT

## 2023-08-02 LAB
25(OH)D3+25(OH)D2 SERPL-MCNC: 42.4 NG/ML (ref 30–100)
ALBUMIN SERPL-MCNC: 4.6 G/DL (ref 3.8–4.9)
ALBUMIN/GLOB SERPL: 1.7 {RATIO} (ref 1.2–2.2)
ALP SERPL-CCNC: 89 IU/L (ref 44–121)
ALT SERPL-CCNC: 49 IU/L (ref 0–32)
APPEARANCE UR: CLEAR
AST SERPL-CCNC: 45 IU/L (ref 0–40)
B BURGDOR IGG+IGM SER QL IA: NEGATIVE
BASOPHILS # BLD AUTO: 0 X10E3/UL (ref 0–0.2)
BASOPHILS NFR BLD AUTO: 0 %
BILIRUB SERPL-MCNC: 0.5 MG/DL (ref 0–1.2)
BILIRUB UR QL STRIP: NEGATIVE
BUN SERPL-MCNC: 13 MG/DL (ref 6–24)
BUN/CREAT SERPL: 18 (ref 9–23)
CALCIUM SERPL-MCNC: 10.3 MG/DL (ref 8.7–10.2)
CENTROMERE B AB SER-ACNC: <0.2 AI (ref 0–0.9)
CHLORIDE SERPL-SCNC: 101 MMOL/L (ref 96–106)
CHOLEST SERPL-MCNC: 188 MG/DL (ref 100–199)
CHOLEST/HDLC SERPL: 3.5 RATIO (ref 0–4.4)
CHROMATIN AB SERPL-ACNC: <0.2 AI (ref 0–0.9)
CO2 SERPL-SCNC: 22 MMOL/L (ref 20–29)
COLOR UR: YELLOW
CREAT SERPL-MCNC: 0.71 MG/DL (ref 0.57–1)
CRP SERPL-MCNC: 5 MG/L (ref 0–10)
DSDNA AB SER-ACNC: 1 IU/ML (ref 0–9)
EGFR: 99 ML/MIN/1.73
ENA JO1 AB SER-ACNC: <0.2 AI (ref 0–0.9)
ENA RNP AB SER-ACNC: 1.5 AI (ref 0–0.9)
ENA SCL70 AB SER-ACNC: <0.2 AI (ref 0–0.9)
ENA SM AB SER-ACNC: <0.2 AI (ref 0–0.9)
ENA SS-A AB SER-ACNC: <0.2 AI (ref 0–0.9)
ENA SS-B AB SER-ACNC: <0.2 AI (ref 0–0.9)
EOSINOPHIL # BLD AUTO: 0.4 X10E3/UL (ref 0–0.4)
EOSINOPHIL NFR BLD AUTO: 5 %
ERYTHROCYTE [DISTWIDTH] IN BLOOD BY AUTOMATED COUNT: 12.8 % (ref 11.7–15.4)
ERYTHROCYTE [SEDIMENTATION RATE] IN BLOOD BY WESTERGREN METHOD: 22 MM/HR (ref 0–40)
EST. AVERAGE GLUCOSE BLD GHB EST-MCNC: 146 MG/DL
GLOBULIN SER-MCNC: 2.7 G/DL (ref 1.5–4.5)
GLUCOSE SERPL-MCNC: 116 MG/DL (ref 70–99)
GLUCOSE UR QL: NEGATIVE
HBA1C MFR BLD: 6.7 % (ref 4.8–5.6)
HCT VFR BLD AUTO: 40.1 % (ref 34–46.6)
HDLC SERPL-MCNC: 53 MG/DL
HGB BLD-MCNC: 14 G/DL (ref 11.1–15.9)
HGB UR QL STRIP: NEGATIVE
IMM GRANULOCYTES # BLD: 0 X10E3/UL (ref 0–0.1)
IMM GRANULOCYTES NFR BLD: 0 %
KETONES UR QL STRIP: NEGATIVE
LDLC SERPL CALC-MCNC: 98 MG/DL (ref 0–99)
LDLC SERPL DIRECT ASSAY-MCNC: 110 MG/DL (ref 0–99)
LEUKOCYTE ESTERASE UR QL STRIP: NEGATIVE
LYMPHOCYTES # BLD AUTO: 2 X10E3/UL (ref 0.7–3.1)
LYMPHOCYTES NFR BLD AUTO: 26 %
MCH RBC QN AUTO: 30.6 PG (ref 26.6–33)
MCHC RBC AUTO-ENTMCNC: 34.9 G/DL (ref 31.5–35.7)
MCV RBC AUTO: 88 FL (ref 79–97)
MICRO URNS: NORMAL
MONOCYTES # BLD AUTO: 0.6 X10E3/UL (ref 0.1–0.9)
MONOCYTES NFR BLD AUTO: 7 %
NEUTROPHILS # BLD AUTO: 4.7 X10E3/UL (ref 1.4–7)
NEUTROPHILS NFR BLD AUTO: 62 %
NITRITE UR QL STRIP: NEGATIVE
PH UR STRIP: 6.5 [PH] (ref 5–7.5)
PLATELET # BLD AUTO: 310 X10E3/UL (ref 150–450)
POTASSIUM SERPL-SCNC: 4.7 MMOL/L (ref 3.5–5.2)
PROT SERPL-MCNC: 7.3 G/DL (ref 6–8.5)
PROT UR QL STRIP: NEGATIVE
RBC # BLD AUTO: 4.57 X10E6/UL (ref 3.77–5.28)
RHEUMATOID FACT SERPL-ACNC: <10 IU/ML
SL AMB SEE BELOW:: ABNORMAL
SL AMB VLDL CHOLESTEROL CALC: 37 MG/DL (ref 5–40)
SODIUM SERPL-SCNC: 137 MMOL/L (ref 134–144)
SP GR UR: 1.01 (ref 1–1.03)
T4 FREE SERPL-MCNC: 0.98 NG/DL (ref 0.82–1.77)
TRIGL SERPL-MCNC: 217 MG/DL (ref 0–149)
TSH SERPL DL<=0.005 MIU/L-ACNC: 3.51 UIU/ML (ref 0.45–4.5)
UROBILINOGEN UR STRIP-ACNC: 0.2 MG/DL (ref 0.2–1)
WBC # BLD AUTO: 7.6 X10E3/UL (ref 3.4–10.8)

## 2023-08-18 ENCOUNTER — OFFICE VISIT (OUTPATIENT)
Dept: OBGYN CLINIC | Facility: CLINIC | Age: 58
End: 2023-08-18
Payer: COMMERCIAL

## 2023-08-18 VITALS
DIASTOLIC BLOOD PRESSURE: 78 MMHG | BODY MASS INDEX: 46.95 KG/M2 | HEIGHT: 64 IN | SYSTOLIC BLOOD PRESSURE: 141 MMHG | WEIGHT: 275 LBS | HEART RATE: 88 BPM

## 2023-08-18 DIAGNOSIS — M25.562 CHRONIC PAIN OF BOTH KNEES: ICD-10-CM

## 2023-08-18 DIAGNOSIS — M25.561 CHRONIC PAIN OF BOTH KNEES: ICD-10-CM

## 2023-08-18 DIAGNOSIS — M17.0 PRIMARY OSTEOARTHRITIS OF BOTH KNEES: Primary | ICD-10-CM

## 2023-08-18 DIAGNOSIS — G89.29 CHRONIC PAIN OF BOTH KNEES: ICD-10-CM

## 2023-08-18 PROCEDURE — 99214 OFFICE O/P EST MOD 30 MIN: CPT | Performed by: STUDENT IN AN ORGANIZED HEALTH CARE EDUCATION/TRAINING PROGRAM

## 2023-08-18 PROCEDURE — 20610 DRAIN/INJ JOINT/BURSA W/O US: CPT | Performed by: STUDENT IN AN ORGANIZED HEALTH CARE EDUCATION/TRAINING PROGRAM

## 2023-08-18 RX ORDER — TRIAMCINOLONE ACETONIDE 40 MG/ML
20 INJECTION, SUSPENSION INTRA-ARTICULAR; INTRAMUSCULAR
Status: COMPLETED | OUTPATIENT
Start: 2023-08-18 | End: 2023-08-18

## 2023-08-18 RX ORDER — BUPIVACAINE HYDROCHLORIDE 2.5 MG/ML
4 INJECTION, SOLUTION INFILTRATION; PERINEURAL
Status: COMPLETED | OUTPATIENT
Start: 2023-08-18 | End: 2023-08-18

## 2023-08-18 RX ADMIN — TRIAMCINOLONE ACETONIDE 20 MG: 40 INJECTION, SUSPENSION INTRA-ARTICULAR; INTRAMUSCULAR at 10:30

## 2023-08-18 RX ADMIN — BUPIVACAINE HYDROCHLORIDE 4 ML: 2.5 INJECTION, SOLUTION INFILTRATION; PERINEURAL at 10:30

## 2023-08-18 NOTE — PROGRESS NOTES
Knee New Office Note    Assessment:     1. Primary osteoarthritis of both knees    2. Chronic pain of both knees    3. BMI 45.0-49.9, adult [facility])        Plan:     Problem List Items Addressed This Visit        Other    Knee pain, bilateral   Other Visit Diagnoses     Primary osteoarthritis of both knees    -  Primary    BMI 45.0-49.9, adult [facility])        Relevant Orders    Ambulatory Referral to Weight Management          Findings today are consistent with severe bilateral knee osteoarthritis most localized to the medial compartments. Imaging and prognosis was reviewed with the patient today. Cortisone steroid injection was administered today. Patient should avoid strenuous activities for the next 1-2 days. Patient should avoid vaccines for the next 2 weeks if possible. Can apply cold compress for soreness. If patient feels relief with the cortisone injections, procedure can be repeated every 3 months. Weight loss was discussed, referral to weight management provided. Follow up as needed. Subjective:     Patient ID: Dianne Guadarrama is a 63 y.o. female. Chief Complaint:  HPI:  63 y.o. female presents to the office for evaluation of bilateral knee pain gradually worsening over the past several months. She is experiencing medial knee pain made worse with activities including ambulation. She has started noticing clicking and popping to the left knee. She has previously tried visco injections without any relief. She has never had any cortisone injections. Allergy:  Allergies   Allergen Reactions   • Molds & Smuts Other (See Comments)     congestion     Medications:  all current active meds have been reviewed  Past Medical History:  Past Medical History:   Diagnosis Date   • Arthritis    • Basal cell carcinoma     Per patient , had a BCC on her left upper arm 6 years ago that was removed by the MOHS procedure    • Disease of thyroid gland    • Herpes zoster     Right, at the T6 dermatome.  last assessed: 05/06/13 • Hypercholesteremia    • Iron deficiency anemia 2017   • Obesity      Past Surgical History:  Past Surgical History:   Procedure Laterality Date   •  SECTION     • CHOLECYSTECTOMY     • MOHS SURGERY      Per patient, had the MOHS procedure for a BCC left upper arm 6 years ago   • US GUIDED BREAST BIOPSY LEFT COMPLETE Left 2019     Family History:  Family History   Problem Relation Age of Onset   • Diabetes Father    • Diabetes Sister    • Thyroid disease Sister         Disorder   • Autoimmune disease Brother         Disorder   • Diabetes Brother    • Thyroid disease Brother    • Breast cancer Maternal Aunt    • Dementia Mother      Social History:  Social History     Substance and Sexual Activity   Alcohol Use No     Social History     Substance and Sexual Activity   Drug Use No     Social History     Tobacco Use   Smoking Status Never   • Passive exposure: Past   Smokeless Tobacco Never           ROS:  General: Per HPI  Skin: Negative, except if noted below  HEENT: Negative  Respiratory: Negative  Cardiovascular: Negative  Gastrointestinal: Negative  Urinary: Negative  Vascular: Negative  Musculoskeletal: Positive per HPI   Neurologic: Positive per HPI  Endocrine: Negative    Objective:  BP Readings from Last 1 Encounters:   23 141/78      Wt Readings from Last 1 Encounters:   23 125 kg (275 lb)        Respiratory:   non-labored respirations    Lymphatics:  no palpable lymph nodes    Gait:   antlagic    Neurologic:   Alert and oriented times 3  Patient with normal sensation except as noted below  Deep tendon reflexes 2+ except as noted in MSK exam    Bilateral Lower Extremity:  Left Knee:       Inspection:  Skin intact    Overall limb alignment varus    Effusion: none    ROM full with pain    Extensor Lag: none    Palpation: medial Joint line tenderness to palpation    AP Stability at 90 deg stable    M/L stability in full extension stable    M/L stability in midflexion stable Motor: 5/5 Q/HS/TA/GS/P    Pulses: 2+ DP / 2+ PT    SILT DP/SP/S/S/TN    Right knee: Inspection:  Skin intact    Overall limb alignment varus    Effusion: none    ROM full with pain    Extensor Lag: none    Palpation: medial Joint line tenderness to palpation    AP Stability at 90 deg stable    M/L stability in full extension stable    M/L stability in midflexion stable    Motor: 5/5 Q/HS/TA/GS/P    Pulses: 2+ DP / 2+ PT    SILT DP/SP/S/S/TN    Imaging:  My interpretation XR AP scanogram/AP bilateral knee/lateral/brown/sunrise bilateral knee: severe medial joint space narrowing, subchondral sclerosis, subchondral cysts, osteophyte formation. No fracture or dislocation. BMI:   Estimated body mass index is 47.2 kg/m² as calculated from the following:    Height as of this encounter: 5' 4" (1.626 m). Weight as of this encounter: 125 kg (275 lb). BSA:   Estimated body surface area is 2.24 meters squared as calculated from the following:    Height as of this encounter: 5' 4" (1.626 m). Weight as of this encounter: 125 kg (275 lb). Large joint arthrocentesis: bilateral knee  Universal Protocol:  Consent: Verbal consent obtained. Risks and benefits: risks, benefits and alternatives were discussed  Consent given by: patient  Time out: Immediately prior to procedure a "time out" was called to verify the correct patient, procedure, equipment, support staff and site/side marked as required.   Timeout called at: 8/18/2023 11:04 AM.  Patient understanding: patient states understanding of the procedure being performed  Site marked: the operative site was marked  Patient identity confirmed: verbally with patient    Supporting Documentation  Indications: pain   Procedure Details  Location: knee - bilateral knee  Preparation: Patient was prepped and draped in the usual sterile fashion  Needle size: 22 G  Ultrasound guidance: no  Approach: anterolateral    Medications (Right): 4 mL bupivacaine 0.25 %; 20 mg triamcinolone acetonide 40 mg/mLMedications (Left): 4 mL bupivacaine 0.25 %; 20 mg triamcinolone acetonide 40 mg/mL   Patient tolerance: patient tolerated the procedure well with no immediate complications  Dressing:  Sterile dressing applied          Scribe Attestation    I,:  Jazmín Hmamer am acting as a scribe while in the presence of the attending physician.:       I,:  Ema Farris, DO personally performed the services described in this documentation    as scribed in my presence.:

## 2023-08-30 ENCOUNTER — HOSPITAL ENCOUNTER (OUTPATIENT)
Dept: BONE DENSITY | Facility: CLINIC | Age: 58
Discharge: HOME | End: 2023-08-30
Payer: COMMERCIAL

## 2023-08-30 ENCOUNTER — HOSPITAL ENCOUNTER (OUTPATIENT)
Dept: MAMMOGRAPHY | Facility: CLINIC | Age: 58
Discharge: HOME | End: 2023-08-30
Payer: COMMERCIAL

## 2023-08-30 ENCOUNTER — OFFICE VISIT (OUTPATIENT)
Dept: FAMILY MEDICINE CLINIC | Facility: CLINIC | Age: 58
End: 2023-08-30
Payer: COMMERCIAL

## 2023-08-30 VITALS
DIASTOLIC BLOOD PRESSURE: 84 MMHG | RESPIRATION RATE: 20 BRPM | TEMPERATURE: 99.3 F | BODY MASS INDEX: 45.82 KG/M2 | OXYGEN SATURATION: 90 % | HEIGHT: 64 IN | HEART RATE: 80 BPM | WEIGHT: 268.4 LBS | SYSTOLIC BLOOD PRESSURE: 130 MMHG

## 2023-08-30 VITALS — HEIGHT: 64 IN | BODY MASS INDEX: 47.05 KG/M2 | WEIGHT: 275.57 LBS

## 2023-08-30 DIAGNOSIS — R73.03 PREDIABETES: Primary | ICD-10-CM

## 2023-08-30 DIAGNOSIS — M85.89 OTHER SPECIFIED DISORDERS OF BONE DENSITY AND STRUCTURE, MULTIPLE SITES: ICD-10-CM

## 2023-08-30 DIAGNOSIS — Z13.820 SCREENING FOR OSTEOPOROSIS: ICD-10-CM

## 2023-08-30 DIAGNOSIS — Z12.31 ENCOUNTER FOR SCREENING MAMMOGRAM FOR BREAST CANCER: ICD-10-CM

## 2023-08-30 DIAGNOSIS — E83.52 HYPERCALCEMIA: ICD-10-CM

## 2023-08-30 DIAGNOSIS — R79.89 ELEVATED LFTS: ICD-10-CM

## 2023-08-30 DIAGNOSIS — E78.2 MIXED HYPERLIPIDEMIA: ICD-10-CM

## 2023-08-30 LAB — COLOGUARD RESULT REPORTABLE: NORMAL

## 2023-08-30 PROCEDURE — 99214 OFFICE O/P EST MOD 30 MIN: CPT | Performed by: FAMILY MEDICINE

## 2023-08-30 PROCEDURE — 77063 BREAST TOMOSYNTHESIS BI: CPT

## 2023-08-30 PROCEDURE — 77067 SCR MAMMO BI INCL CAD: CPT

## 2023-08-30 PROCEDURE — 77080 DXA BONE DENSITY AXIAL: CPT

## 2023-08-30 NOTE — PROGRESS NOTES
Assessment/Plan:  Problem List Items Addressed This Visit        Other    Elevated LFTs     The patient has evidence of elevated LFTs on her current lab work. We will send her for the additional testing as indicated and we will follow-up with the results. Relevant Orders    Hepatic function panel    Hepatitis panel, acute    US abdomen complete    Hypercalcemia     . The patient has evidence of hypercalcemia on her current lab work. We will send her for the additional testing as ordered and we will follow-up with the results. Relevant Orders    PTH, intact    CALCIUM & CALCIUM IONIZED    Protein electrophoresis, serum    Hyperlipidemia     Patient's lipids are well controlled with the current dose of her atorvastatin. We have made no changes today. We will continue to monitor her closely. We will see her back as scheduled. Prediabetes - Primary     The patient has evidence of prediabetes on her lab work. She already has lost some weight since her last visit and will continue with her healthy diet and exercise. She will be able to exercise more since her knees are feeling better. We will see her back as scheduled. Return in about 4 months (around 12/30/2023) for Recheck. I spent 15 minutes during the visit reviewing the history from the patient, performing the examination, discussing the findings with the patient, providing counseling and education, and making a plan. I spent 15 minutes ordering referrals and testing and documenting. Subjective:   Chief Complaint   Patient presents with   • Follow-up     1 month check         Patient ID: Kaylen Murillo is a 63 y.o. female presents today for routine follow-up to review her recent lab work  Kaylen Murillo is a 63 y.o. female who presents today for follow-up of her hyperlipidemia, bilateral knee osteoarthritis, prediabetes, and obesity. The patient states she is doing much better since her last visit.   She did see orthopedics and had injections in both knees and feels much better. She is feeling better overall since her knee injection. She feels like a new patient. She has been watching her diet and has lost some weight since her last visit. The patient denies any chest pain, shortness of breath, or palpitations. There is no edema. There are no headaches or visual changes. There is no lightheadedness, dizziness, or fainting spells. The patient currently denies any nausea, vomiting, or GERD symptoms. she has normal bowel movements and normal urine output. she has a normal appetite. Hyperlipidemia  This is a chronic problem. The current episode started more than 1 year ago. The problem is controlled. Recent lipid tests were reviewed and are normal. Exacerbating diseases include obesity. Pertinent negatives include no chest pain, focal sensory loss, focal weakness, leg pain, myalgias or shortness of breath. Current antihyperlipidemic treatment includes statins. The following portions of the patient's history were reviewed and updated as appropriate: allergies, current medications, past family history, past medical history, past social history, past surgical history and problem list.  Patient Active Problem List   Diagnosis   • Esophagitis, reflux   • Hyperlipidemia   • Knee pain, bilateral   • Polyarthralgia   • Vitamin D deficiency   • Allergic rhinitis   • Neoplasm of skin   • Prediabetes   • Hypercalcemia   • Elevated LFTs     Past Medical History:   Diagnosis Date   • Arthritis    • Basal cell carcinoma     Per patient , had a BCC on her left upper arm 6 years ago that was removed by the MOHS procedure    • Disease of thyroid gland    • Herpes zoster     Right, at the T6 dermatome.  last assessed: 13   • Hypercholesteremia    • Iron deficiency anemia 2017   • Obesity      Past Surgical History:   Procedure Laterality Date   • BREAST BIOPSY Left     US guided   •  SECTION     • CHOLECYSTECTOMY     • MOHS SURGERY      Per patient, had the MOHS procedure for a BCC left upper arm 6 years ago   • Alvin Dooley Left 11/07/2019     Allergies   Allergen Reactions   • Molds & Smuts Other (See Comments)     congestion     Family History   Problem Relation Age of Onset   • Dementia Mother    • Diabetes Father    • Diabetes Sister    • Thyroid disease Sister         Disorder   • No Known Problems Sister    • No Known Problems Sister    • No Known Problems Daughter    • No Known Problems Maternal Grandmother    • No Known Problems Maternal Grandfather    • No Known Problems Paternal Grandmother    • No Known Problems Paternal Grandfather    • Autoimmune disease Brother         Disorder   • Diabetes Brother    • Thyroid disease Brother    • No Known Problems Brother    • Breast cancer Maternal Aunt         unknown age of onset   • No Known Problems Maternal Aunt    • No Known Problems Maternal Aunt    • No Known Problems Maternal Aunt    • No Known Problems Paternal Aunt      Social History     Socioeconomic History   • Marital status: Single     Spouse name: Not on file   • Number of children: 1   • Years of education: Not on file   • Highest education level: Not on file   Occupational History   • Not on file   Tobacco Use   • Smoking status: Never     Passive exposure: Past   • Smokeless tobacco: Never   Vaping Use   • Vaping Use: Never used   Substance and Sexual Activity   • Alcohol use: No   • Drug use: No   • Sexual activity: Not Currently     Birth control/protection: Post-menopausal   Other Topics Concern   • Not on file   Social History Narrative   • Not on file     Social Determinants of Health     Financial Resource Strain: Low Risk  (3/17/2021)    Overall Financial Resource Strain (CARDIA)    • Difficulty of Paying Living Expenses: Not hard at all   Food Insecurity: No Food Insecurity (3/17/2021)    Hunger Vital Sign    • Worried About Running Out of Food in the Last Year: Never true    • Ran Out of Food in the Last Year: Never true   Transportation Needs: No Transportation Needs (3/17/2021)    PRAPARE - Transportation    • Lack of Transportation (Medical): No    • Lack of Transportation (Non-Medical): No   Physical Activity: Inactive (3/17/2021)    Exercise Vital Sign    • Days of Exercise per Week: 0 days    • Minutes of Exercise per Session: 0 min   Stress: Stress Concern Present (3/17/2021)    [facility]    • Feeling of Stress : To some extent   Social Connections: Socially Isolated (4/1/2021)    Social Connection and Isolation Panel [NHANES]    • Frequency of Communication with Friends and Family: Three times a week    • Frequency of Social Gatherings with Friends and Family: Never    • Attends Zoroastrian Services: Never    • Active Member of Clubs or Organizations: No    • Attends Club or Organization Meetings: Never    • Marital Status: Never    Intimate Partner Violence: Not At Risk (7/25/2023)    Humiliation, Afraid, Rape, and Kick questionnaire    • Fear of Current or Ex-Partner: No    • Emotionally Abused: No    • Physically Abused: No    • Sexually Abused: No   Housing Stability: Not on file     Current Outpatient Medications on File Prior to Visit   Medication Sig Dispense Refill   • atorvastatin (LIPITOR) 20 mg tablet Take 1 tablet (20 mg total) by mouth daily 90 tablet 1   • Coenzyme Q10 (CO Q 10 PO) Take by mouth daily     • levothyroxine 75 mcg tablet Take 1 tablet (75 mcg total) by mouth daily 90 tablet 1   • loratadine (CLARITIN) 10 mg tablet Take 10 mg by mouth daily     • Multiple Vitamin (MULTIVITAMIN ADULT PO) Take by mouth daily     • Omega-3 Fatty Acids (FISH OIL PO) Take by mouth daily       No current facility-administered medications on file prior to visit. Review of Systems   Constitutional: Negative. HENT: Negative. Eyes: Negative. Respiratory: Negative.   Negative for shortness of breath. Cardiovascular: Negative. Negative for chest pain. Gastrointestinal: Negative. Endocrine: Negative. Genitourinary: Negative. Musculoskeletal: Negative. Negative for myalgias. Skin: Negative. Allergic/Immunologic: Negative. Neurological: Negative. Negative for focal weakness. Hematological: Negative. Psychiatric/Behavioral: Negative. Objective:  Vitals:    08/30/23 1641   BP: 130/84   BP Location: Right arm   Patient Position: Sitting   Cuff Size: Large   Pulse: 80   Resp: 20   Temp: 99.3 °F (37.4 °C)   TempSrc: Tympanic   SpO2: 90%   Weight: 122 kg (268 lb 6.4 oz)   Height: 5' 3.8" (1.621 m)     Body mass index is 46.36 kg/m². Physical Exam  Constitutional:       Appearance: She is well-developed. HENT:      Head: Normocephalic and atraumatic. Mouth/Throat:      Pharynx: No oropharyngeal exudate. Eyes:      Conjunctiva/sclera: Conjunctivae normal.      Pupils: Pupils are equal, round, and reactive to light. Neck:      Thyroid: No thyromegaly. Vascular: No JVD. Trachea: No tracheal deviation. Cardiovascular:      Rate and Rhythm: Normal rate and regular rhythm. Heart sounds: Normal heart sounds. No murmur heard. No friction rub. No gallop. Pulmonary:      Effort: Pulmonary effort is normal. No respiratory distress. Breath sounds: Normal breath sounds. No stridor. No wheezing or rales. Chest:      Chest wall: No tenderness. Abdominal:      General: Bowel sounds are normal. There is no distension. Palpations: Abdomen is soft. There is no mass. Tenderness: There is no abdominal tenderness. There is no guarding or rebound. Musculoskeletal:         General: No tenderness or deformity. Normal range of motion. Cervical back: Normal range of motion. Lymphadenopathy:      Cervical: No cervical adenopathy. Skin:     General: Skin is warm and dry.    Neurological:      Mental Status: She is alert and oriented to person, place, and time. Cranial Nerves: No cranial nerve deficit. Motor: No abnormal muscle tone. Coordination: Coordination normal.      Deep Tendon Reflexes: Reflexes are normal and symmetric. Reflexes normal.         Office Visit on 07/25/2023   Component Date Value   • Cologuard Result 08/25/2023 Sample Could Not Be Processed    • Free t4 08/01/2023 0.98    • TSH 08/01/2023 3.510    • Glucose, Random 08/01/2023 116 (H)    • BUN 08/01/2023 13    • Creatinine 08/01/2023 0.71    • eGFR 08/01/2023 99    • SL AMB BUN/CREATININE RA* 08/01/2023 18    • Sodium 08/01/2023 137    • Potassium 08/01/2023 4.7    • Chloride 08/01/2023 101    • CO2 08/01/2023 22    • CALCIUM 08/01/2023 10.3 (H)    • Protein, Total 08/01/2023 7.3    • Albumin 08/01/2023 4.6    • Globulin, Total 08/01/2023 2.7    • Albumin/Globulin Ratio 08/01/2023 1.7    • TOTAL BILIRUBIN 08/01/2023 0.5    • Alk Phos Isoenzymes 08/01/2023 89    • AST 08/01/2023 45 (H)    • ALT 08/01/2023 49 (H)    • White Blood Cell Count 08/01/2023 7.6    • Red Blood Cell Count 08/01/2023 4.57    • Hemoglobin 08/01/2023 14.0    • HCT 08/01/2023 40.1    • MCV 08/01/2023 88    • MCH 08/01/2023 30.6    • MCHC 08/01/2023 34.9    • RDW 08/01/2023 12.8    • Platelet Count 49/49/4848 310    • Neutrophils 08/01/2023 62    • Lymphocytes 08/01/2023 26    • Monocytes 08/01/2023 7    • Eosinophils 08/01/2023 5    • Basophils PCT 08/01/2023 0    • Neutrophils (Absolute) 08/01/2023 4.7    • Lymphocytes (Absolute) 08/01/2023 2.0    • Monocytes (Absolute) 08/01/2023 0.6    • Eosinophils (Absolute) 08/01/2023 0.4    • Basophils ABS 08/01/2023 0.0    • Immature Granulocytes 08/01/2023 0    • Immature Granulocytes (A* 08/01/2023 0.0    • LDL Direct 08/01/2023 110 (H)    • Cholesterol, Total 08/01/2023 188    • Triglycerides 08/01/2023 217 (H)    • HDL 08/01/2023 53    • VLDL Cholesterol Calcula* 08/01/2023 37    • LDL Calculated 08/01/2023 98    • T.  Chol/HDL Ratio 08/01/2023 3.5    • Specific Gravity 08/01/2023 1.007    • Ph 08/01/2023 6.5    • Color UA 08/01/2023 Yellow    • Urine Appearance 08/01/2023 Clear    • Leukocyte Esterase 08/01/2023 Negative    • Protein 08/01/2023 Negative    • Glucose, 24 HR Urine 08/01/2023 Negative    • Ketone, Urine 08/01/2023 Negative    • Blood, Urine 08/01/2023 Negative    • Bilirubin, Urine 08/01/2023 Negative    • Urobilinogen Urine 08/01/2023 0.2    • Nitrites Urine 08/01/2023 Negative    • Microscopic Examination 08/01/2023 Comment    • Rheumatoid Factor (RF) 08/01/2023 <10.0    • LYME TOTAL ANTIBODY EIA 08/01/2023 Negative    • ds DNA Ab 08/01/2023 1    • MADHAV RNP Ab 08/01/2023 1.5 (H)    • MADHAV Patterson (SM) Ab 08/01/2023 <0.2    • Scleroderma SCL-70 08/01/2023 <0.2    • SS-A (RO) Ab 08/01/2023 <0.2    • SS-B (LA) Ab 08/01/2023 <0.2    • Antichromatin Abs 08/01/2023 <0.2    • Anti VLADIMIR-1 08/01/2023 <0.2    • Anti-Centromere B Antibo* 08/01/2023 <0.2    • See below: 08/01/2023 Comment    • Sedimentation Rate 08/01/2023 22    • 25-HYDROXY VIT D 08/01/2023 42.4    • C-Reactive Protein, Quant 08/01/2023 5    • Hemoglobin A1C 08/01/2023 6.7 (H)    • Estimated Average Glucose 08/01/2023 146

## 2023-09-04 PROBLEM — E83.52 HYPERCALCEMIA: Status: ACTIVE | Noted: 2023-09-04

## 2023-09-04 PROBLEM — R79.89 ELEVATED LFTS: Status: ACTIVE | Noted: 2023-09-04

## 2023-09-04 NOTE — ASSESSMENT & PLAN NOTE
The patient has evidence of elevated LFTs on her current lab work. We will send her for the additional testing as indicated and we will follow-up with the results.

## 2023-09-04 NOTE — ASSESSMENT & PLAN NOTE
Patient's lipids are well controlled with the current dose of her atorvastatin. We have made no changes today. We will continue to monitor her closely. We will see her back as scheduled.

## 2023-09-04 NOTE — ASSESSMENT & PLAN NOTE
.  The patient has evidence of hypercalcemia on her current lab work. We will send her for the additional testing as ordered and we will follow-up with the results.

## 2023-09-04 NOTE — ASSESSMENT & PLAN NOTE
The patient has evidence of prediabetes on her lab work. She already has lost some weight since her last visit and will continue with her healthy diet and exercise. She will be able to exercise more since her knees are feeling better. We will see her back as scheduled.

## 2023-09-09 ENCOUNTER — HOSPITAL ENCOUNTER (OUTPATIENT)
Dept: ULTRASOUND IMAGING | Facility: HOSPITAL | Age: 58
Discharge: HOME | End: 2023-09-09
Payer: COMMERCIAL

## 2023-09-09 DIAGNOSIS — R79.89 ELEVATED LFTS: ICD-10-CM

## 2023-09-09 PROCEDURE — 76700 US EXAM ABDOM COMPLETE: CPT

## 2023-09-14 ENCOUNTER — HOSPITAL ENCOUNTER (OUTPATIENT)
Dept: ULTRASOUND IMAGING | Facility: CLINIC | Age: 58
Discharge: HOME | End: 2023-09-14
Payer: COMMERCIAL

## 2023-09-14 DIAGNOSIS — R92.8 ABNORMAL SCREENING MAMMOGRAM: ICD-10-CM

## 2023-09-14 PROCEDURE — 76642 ULTRASOUND BREAST LIMITED: CPT

## 2023-09-26 ENCOUNTER — HOSPITAL ENCOUNTER (OUTPATIENT)
Dept: ULTRASOUND IMAGING | Facility: CLINIC | Age: 58
Discharge: HOME | End: 2023-09-26
Payer: COMMERCIAL

## 2023-09-26 ENCOUNTER — HOSPITAL ENCOUNTER (OUTPATIENT)
Dept: MAMMOGRAPHY | Facility: CLINIC | Age: 58
Discharge: HOME | End: 2023-09-26
Payer: COMMERCIAL

## 2023-09-26 VITALS — SYSTOLIC BLOOD PRESSURE: 135 MMHG | HEART RATE: 84 BPM | DIASTOLIC BLOOD PRESSURE: 80 MMHG

## 2023-09-26 DIAGNOSIS — R92.8 ABNORMAL SCREENING MAMMOGRAM: ICD-10-CM

## 2023-09-26 PROCEDURE — 88341 IMHCHEM/IMCYTCHM EA ADD ANTB: CPT | Performed by: STUDENT IN AN ORGANIZED HEALTH CARE EDUCATION/TRAINING PROGRAM

## 2023-09-26 PROCEDURE — A4648 IMPLANTABLE TISSUE MARKER: HCPCS

## 2023-09-26 PROCEDURE — 19083 BX BREAST 1ST LESION US IMAG: CPT

## 2023-09-26 PROCEDURE — 88305 TISSUE EXAM BY PATHOLOGIST: CPT | Performed by: STUDENT IN AN ORGANIZED HEALTH CARE EDUCATION/TRAINING PROGRAM

## 2023-09-26 PROCEDURE — 88342 IMHCHEM/IMCYTCHM 1ST ANTB: CPT | Performed by: STUDENT IN AN ORGANIZED HEALTH CARE EDUCATION/TRAINING PROGRAM

## 2023-09-26 RX ORDER — LIDOCAINE HYDROCHLORIDE 10 MG/ML
5 INJECTION, SOLUTION EPIDURAL; INFILTRATION; INTRACAUDAL; PERINEURAL ONCE
Status: COMPLETED | OUTPATIENT
Start: 2023-09-26 | End: 2023-09-26

## 2023-09-26 RX ADMIN — LIDOCAINE HYDROCHLORIDE 5 ML: 10 INJECTION, SOLUTION EPIDURAL; INFILTRATION; INTRACAUDAL; PERINEURAL at 14:13

## 2023-09-27 LAB
ALBUMIN SERPL ELPH-MCNC: 3.7 G/DL (ref 2.9–4.4)
ALBUMIN SERPL-MCNC: 4.5 G/DL (ref 3.8–4.9)
ALBUMIN/GLOB SERPL: 1.1 {RATIO} (ref 0.7–1.7)
ALP SERPL-CCNC: 74 IU/L (ref 44–121)
ALPHA1 GLOB SERPL ELPH-MCNC: 0.1 G/DL (ref 0–0.4)
ALPHA2 GLOB SERPL ELPH-MCNC: 1 G/DL (ref 0.4–1)
ALT SERPL-CCNC: 34 IU/L (ref 0–32)
AST SERPL-CCNC: 20 IU/L (ref 0–40)
B-GLOBULIN SERPL ELPH-MCNC: 1.2 G/DL (ref 0.7–1.3)
BILIRUB DIRECT SERPL-MCNC: 0.17 MG/DL (ref 0–0.4)
BILIRUB SERPL-MCNC: 0.6 MG/DL (ref 0–1.2)
CA-I SERPL ISE-MCNC: 5.5 MG/DL (ref 4.5–5.6)
CALCIUM SERPL-MCNC: 10.8 MG/DL (ref 8.7–10.2)
GAMMA GLOB SERPL ELPH-MCNC: 1 G/DL (ref 0.4–1.8)
GLOBULIN SER CALC-MCNC: 3.3 G/DL (ref 2.2–3.9)
LABORATORY COMMENT REPORT: NORMAL
M PROTEIN SERPL ELPH-MCNC: NORMAL G/DL
PROT SERPL-MCNC: 7 G/DL (ref 6–8.5)
PTH-INTACT SERPL-MCNC: 47 PG/ML (ref 15–65)

## 2023-09-28 ENCOUNTER — TELEPHONE (OUTPATIENT)
Dept: MAMMOGRAPHY | Facility: CLINIC | Age: 58
End: 2023-09-28

## 2023-09-28 PROCEDURE — 88305 TISSUE EXAM BY PATHOLOGIST: CPT | Performed by: STUDENT IN AN ORGANIZED HEALTH CARE EDUCATION/TRAINING PROGRAM

## 2023-09-28 PROCEDURE — 88342 IMHCHEM/IMCYTCHM 1ST ANTB: CPT | Performed by: STUDENT IN AN ORGANIZED HEALTH CARE EDUCATION/TRAINING PROGRAM

## 2023-09-28 PROCEDURE — 88341 IMHCHEM/IMCYTCHM EA ADD ANTB: CPT | Performed by: STUDENT IN AN ORGANIZED HEALTH CARE EDUCATION/TRAINING PROGRAM

## 2024-01-12 ENCOUNTER — OFFICE VISIT (OUTPATIENT)
Dept: OBGYN CLINIC | Facility: CLINIC | Age: 59
End: 2024-01-12
Payer: COMMERCIAL

## 2024-01-12 VITALS
HEART RATE: 93 BPM | WEIGHT: 268 LBS | DIASTOLIC BLOOD PRESSURE: 98 MMHG | SYSTOLIC BLOOD PRESSURE: 161 MMHG | HEIGHT: 64 IN | BODY MASS INDEX: 45.75 KG/M2

## 2024-01-12 DIAGNOSIS — M17.0 PRIMARY OSTEOARTHRITIS OF BOTH KNEES: Primary | ICD-10-CM

## 2024-01-12 PROCEDURE — 99213 OFFICE O/P EST LOW 20 MIN: CPT | Performed by: PHYSICIAN ASSISTANT

## 2024-01-12 PROCEDURE — 20610 DRAIN/INJ JOINT/BURSA W/O US: CPT | Performed by: PHYSICIAN ASSISTANT

## 2024-01-12 RX ORDER — LIDOCAINE HYDROCHLORIDE 10 MG/ML
4 INJECTION, SOLUTION INFILTRATION; PERINEURAL
Status: COMPLETED | OUTPATIENT
Start: 2024-01-12 | End: 2024-01-12

## 2024-01-12 RX ORDER — TRIAMCINOLONE ACETONIDE 40 MG/ML
40 INJECTION, SUSPENSION INTRA-ARTICULAR; INTRAMUSCULAR
Status: COMPLETED | OUTPATIENT
Start: 2024-01-12 | End: 2024-01-12

## 2024-01-12 RX ADMIN — LIDOCAINE HYDROCHLORIDE 4 ML: 10 INJECTION, SOLUTION INFILTRATION; PERINEURAL at 15:00

## 2024-01-12 RX ADMIN — TRIAMCINOLONE ACETONIDE 40 MG: 40 INJECTION, SUSPENSION INTRA-ARTICULAR; INTRAMUSCULAR at 15:00

## 2024-01-12 NOTE — PROGRESS NOTES
Knee Follow up Office Note    Assessment:     1. Primary osteoarthritis of both knees          Plan:     Problem List Items Addressed This Visit    None  Visit Diagnoses       Primary osteoarthritis of both knees    -  Primary              59 y/o female with severe bilateral knee osteoarthritis most localized to the medial compartments. She did well with previous cortisone injections for several months.  Cortisone steroid injections were repeated today. Patient should avoid strenuous activities for the next 1-2 days. Patient should avoid vaccines for the next 2 weeks if possible. Can apply cold compress for soreness. If patient feels relief with the cortisone injections, procedure can be repeated every 3 months. She would like to make an appointment in 3 months.    Subjective:     Patient ID: Thuy Allan is a 58 y.o. female.  Chief Complaint:  HPI:  58 y.o. female presents to the office for a follow up evaluation of her bilateral knees.  She has known OA of the bilateral knees which have been gradually worsening over the past several months. She has previously tried visco injections without any relief.  She had cortisone injections into the bilateral knees at last visit which significantly helped her symptoms.  She would like to repeat these injections.     Allergy:  Allergies   Allergen Reactions    Molds & Smuts Other (See Comments)     congestion     Medications:  all current active meds have been reviewed  Past Medical History:  Past Medical History:   Diagnosis Date    Arthritis     Basal cell carcinoma     Per patient , had a BCC on her left upper arm 6 years ago that was removed by the MOHS procedure     Disease of thyroid gland     Herpes zoster     Right, at the T6 dermatome. last assessed: 05/06/13    Hypercholesteremia     Iron deficiency anemia 8/25/2017    Obesity      Past Surgical History:  Past Surgical History:   Procedure Laterality Date    BREAST BIOPSY Left 11/07/2019    US guided    BREAST  BIOPSY Left 2023     SECTION      CHOLECYSTECTOMY      MOHS SURGERY      Per patient, had the MOHS procedure for a BCC left upper arm 6 years ago    US GUIDED BREAST BIOPSY LEFT COMPLETE Left 2019    US GUIDED BREAST BIOPSY LEFT COMPLETE Left 2023     Family History:  Family History   Problem Relation Age of Onset    Dementia Mother     Diabetes Father     Diabetes Sister     Thyroid disease Sister         Disorder    No Known Problems Sister     No Known Problems Sister     No Known Problems Daughter     No Known Problems Maternal Grandmother     No Known Problems Maternal Grandfather     No Known Problems Paternal Grandmother     No Known Problems Paternal Grandfather     Autoimmune disease Brother         Disorder    Diabetes Brother     Thyroid disease Brother     No Known Problems Brother     Breast cancer Maternal Aunt         unknown age of onset    No Known Problems Maternal Aunt     No Known Problems Maternal Aunt     No Known Problems Maternal Aunt     No Known Problems Paternal Aunt      Social History:  Social History     Substance and Sexual Activity   Alcohol Use No     Social History     Substance and Sexual Activity   Drug Use No     Social History     Tobacco Use   Smoking Status Never    Passive exposure: Past   Smokeless Tobacco Never           ROS:  General: Per HPI  Skin: Negative, except if noted below  HEENT: Negative  Respiratory: Negative  Cardiovascular: Negative  Gastrointestinal: Negative  Urinary: Negative  Vascular: Negative  Musculoskeletal: Positive per HPI   Neurologic: Positive per HPI  Endocrine: Negative    Objective:  BP Readings from Last 1 Encounters:   24 161/98      Wt Readings from Last 1 Encounters:   24 122 kg (268 lb)        Respiratory:   non-labored respirations    Lymphatics:  no palpable lymph nodes    Gait:   antlagic    Neurologic:   Alert and oriented times 3  Patient with normal sensation except as noted below  Deep tendon  "reflexes 2+ except as noted in MSK exam    Bilateral Lower Extremity:  Left Knee:      Inspection:  Skin intact    Overall limb alignment varus    Effusion: none    ROM full with pain    Extensor Lag: none    Palpation: medial Joint line tenderness to palpation    AP Stability at 90 deg stable    M/L stability in full extension stable    M/L stability in midflexion stable    Motor: 5/5 Q/HS/TA/GS/P    Pulses: 2+ DP / 2+ PT    SILT DP/SP/S/S/TN    Right knee:     Inspection:  Skin intact    Overall limb alignment varus    Effusion: none    ROM full with pain    Extensor Lag: none    Palpation: medial Joint line tenderness to palpation    AP Stability at 90 deg stable    M/L stability in full extension stable    M/L stability in midflexion stable    Motor: 5/5 Q/HS/TA/GS/P    Pulses: 2+ DP / 2+ PT    SILT DP/SP/S/S/TN    Imaging:  No new imaging today    BMI:   Estimated body mass index is 46.29 kg/m² as calculated from the following:    Height as of this encounter: 5' 3.8\" (1.621 m).    Weight as of this encounter: 122 kg (268 lb).  BSA:   Estimated body surface area is 2.21 meters squared as calculated from the following:    Height as of this encounter: 5' 3.8\" (1.621 m).    Weight as of this encounter: 122 kg (268 lb).         Large joint arthrocentesis: bilateral knee  Universal Protocol:  Consent: Verbal consent obtained.  Risks and benefits: risks, benefits and alternatives were discussed  Consent given by: patient  Patient understanding: patient states understanding of the procedure being performed  Patient identity confirmed: verbally with patient  Supporting Documentation  Indications: pain   Procedure Details  Location: knee - bilateral knee  Needle size: 22 G  Approach: anterolateral    Medications (Right): 4 mL lidocaine 1 %; 40 mg triamcinolone acetonide 40 mg/mLMedications (Left): 4 mL lidocaine 1 %; 40 mg triamcinolone acetonide 40 mg/mL   Patient tolerance: patient tolerated the procedure well with no " immediate complications  Dressing:  Sterile dressing applied          Scribe Attestation      I,:  Ruthann Manning PA-C am acting as a scribe while in the presence of the attending physician.:       I,:  Errol Bundy DO personally performed the services described in this documentation    as scribed in my presence.:

## 2024-02-27 DIAGNOSIS — E03.9 ACQUIRED HYPOTHYROIDISM: ICD-10-CM

## 2024-02-27 RX ORDER — LEVOTHYROXINE SODIUM 0.07 MG/1
75 TABLET ORAL DAILY
Qty: 90 TABLET | Refills: 1 | Status: SHIPPED | OUTPATIENT
Start: 2024-02-27

## 2024-04-12 ENCOUNTER — OFFICE VISIT (OUTPATIENT)
Dept: OBGYN CLINIC | Facility: CLINIC | Age: 59
End: 2024-04-12

## 2024-04-12 VITALS
WEIGHT: 268 LBS | BODY MASS INDEX: 45.75 KG/M2 | HEART RATE: 99 BPM | HEIGHT: 64 IN | DIASTOLIC BLOOD PRESSURE: 87 MMHG | SYSTOLIC BLOOD PRESSURE: 149 MMHG

## 2024-04-12 DIAGNOSIS — M25.561 CHRONIC PAIN OF BOTH KNEES: ICD-10-CM

## 2024-04-12 DIAGNOSIS — M17.0 PRIMARY OSTEOARTHRITIS OF BOTH KNEES: Primary | ICD-10-CM

## 2024-04-12 DIAGNOSIS — G89.29 CHRONIC PAIN OF BOTH KNEES: ICD-10-CM

## 2024-04-12 DIAGNOSIS — M25.562 CHRONIC PAIN OF BOTH KNEES: ICD-10-CM

## 2024-04-12 RX ADMIN — TRIAMCINOLONE ACETONIDE 40 MG: 40 INJECTION, SUSPENSION INTRA-ARTICULAR; INTRAMUSCULAR at 15:00

## 2024-04-12 RX ADMIN — LIDOCAINE HYDROCHLORIDE 4 ML: 10 INJECTION, SOLUTION INFILTRATION; PERINEURAL at 15:00

## 2024-04-12 NOTE — PROGRESS NOTES
Knee Follow up Office Note    Assessment:     1. Primary osteoarthritis of both knees    2. Chronic pain of both knees            Plan:     Problem List Items Addressed This Visit          Surgery/Wound/Pain    Knee pain, bilateral    Relevant Orders    Large joint arthrocentesis: bilateral knee     Other Visit Diagnoses       Primary osteoarthritis of both knees    -  Primary    Relevant Orders    Large joint arthrocentesis: bilateral knee                59 y.o. female with severe bilateral knee osteoarthritis most localized to the medial compartments. She did well with previous cortisone injections for several months, last injections were 1/12/24.  Cortisone steroid injections were repeated today. Patient should avoid strenuous activities for the next 1-2 days. Patient should avoid vaccines for the next 2 weeks if possible. Can apply cold compress for soreness. If patient feels relief with the cortisone injections, procedure can be repeated every 3 months. She would like to make an appointment in 3 months.    Subjective:     Patient ID: Thuy Allan is a 59 y.o. female.  Chief Complaint:  HPI:  59 y.o. female presents to the office for a follow up evaluation of her bilateral knees.  She has known OA of the bilateral knees which have been gradually worsening over the past several months. She has previously tried visco injections without any relief.  She had cortisone injections into the bilateral knees at last visit which significantly helped her symptoms, last was 1/12/24.  She would like to repeat these injections.     Allergy:  Allergies   Allergen Reactions    Molds & Smuts Other (See Comments)     congestion     Medications:  all current active meds have been reviewed  Past Medical History:  Past Medical History:   Diagnosis Date    Arthritis     Basal cell carcinoma     Per patient , had a BCC on her left upper arm 6 years ago that was removed by the MOHS procedure     Disease of thyroid gland     Herpes  zoster     Right, at the T6 dermatome. last assessed: 13    Hypercholesteremia     Iron deficiency anemia 2017    Obesity      Past Surgical History:  Past Surgical History:   Procedure Laterality Date    BREAST BIOPSY Left 2019    US guided    BREAST BIOPSY Left 2023     SECTION      CHOLECYSTECTOMY      MOHS SURGERY      Per patient, had the MOHS procedure for a BCC left upper arm 6 years ago    US GUIDED BREAST BIOPSY LEFT COMPLETE Left 2019    US GUIDED BREAST BIOPSY LEFT COMPLETE Left 2023     Family History:  Family History   Problem Relation Age of Onset    Dementia Mother     Diabetes Father     Diabetes Sister     Thyroid disease Sister         Disorder    No Known Problems Sister     No Known Problems Sister     No Known Problems Daughter     No Known Problems Maternal Grandmother     No Known Problems Maternal Grandfather     No Known Problems Paternal Grandmother     No Known Problems Paternal Grandfather     Autoimmune disease Brother         Disorder    Diabetes Brother     Thyroid disease Brother     No Known Problems Brother     Breast cancer Maternal Aunt         unknown age of onset    No Known Problems Maternal Aunt     No Known Problems Maternal Aunt     No Known Problems Maternal Aunt     No Known Problems Paternal Aunt      Social History:  Social History     Substance and Sexual Activity   Alcohol Use No     Social History     Substance and Sexual Activity   Drug Use No     Social History     Tobacco Use   Smoking Status Never    Passive exposure: Past   Smokeless Tobacco Never           ROS:  General: Per HPI  Skin: Negative, except if noted below  HEENT: Negative  Respiratory: Negative  Cardiovascular: Negative  Gastrointestinal: Negative  Urinary: Negative  Vascular: Negative  Musculoskeletal: Positive per HPI   Neurologic: Positive per HPI  Endocrine: Negative    Objective:  BP Readings from Last 1 Encounters:   24 149/87      Wt Readings  "from Last 1 Encounters:   04/12/24 122 kg (268 lb)        Respiratory:   non-labored respirations    Lymphatics:  no palpable lymph nodes    Gait:   antlagic    Neurologic:   Alert and oriented times 3  Patient with normal sensation except as noted below  Deep tendon reflexes 2+ except as noted in MSK exam    Bilateral Lower Extremity:  Left Knee:      Inspection:  Skin intact    Overall limb alignment varus    Effusion: none    ROM full with pain    Extensor Lag: none    Palpation: medial Joint line tenderness to palpation    AP Stability at 90 deg stable    M/L stability in full extension stable    M/L stability in midflexion stable    Motor: 5/5 Q/HS/TA/GS/P    Pulses: 2+ DP / 2+ PT    SILT DP/SP/S/S/TN    Right knee:     Inspection:  Skin intact    Overall limb alignment varus    Effusion: none    ROM full with pain    Extensor Lag: none    Palpation: medial Joint line tenderness to palpation    AP Stability at 90 deg stable    M/L stability in full extension stable    M/L stability in midflexion stable    Motor: 5/5 Q/HS/TA/GS/P    Pulses: 2+ DP / 2+ PT    SILT DP/SP/S/S/TN    Imaging:  No new imaging today    BMI:   Estimated body mass index is 46.29 kg/m² as calculated from the following:    Height as of this encounter: 5' 3.8\" (1.621 m).    Weight as of this encounter: 122 kg (268 lb).  BSA:   Estimated body surface area is 2.21 meters squared as calculated from the following:    Height as of this encounter: 5' 3.8\" (1.621 m).    Weight as of this encounter: 122 kg (268 lb).         Large joint arthrocentesis: bilateral knee  Universal Protocol:  Consent: Verbal consent obtained.  Risks and benefits: risks, benefits and alternatives were discussed  Consent given by: patient  Patient understanding: patient states understanding of the procedure being performed  Patient identity confirmed: verbally with patient  Supporting Documentation  Indications: pain   Procedure Details  Location: knee - bilateral " knee  Preparation: Patient was prepped and draped in the usual sterile fashion  Needle size: 22 G  Approach: anterolateral    Medications (Right): 4 mL lidocaine 1 %; 40 mg triamcinolone acetonide 40 mg/mLMedications (Left): 4 mL lidocaine 1 %; 40 mg triamcinolone acetonide 40 mg/mL   Patient tolerance: patient tolerated the procedure well with no immediate complications  Dressing:  Sterile dressing applied          Scribe Attestation      I,:  Ruthann Manning PA-C am acting as a scribe while in the presence of the attending physician.:       I,:  Errol Bundy DO personally performed the services described in this documentation    as scribed in my presence.:

## 2024-04-13 RX ORDER — LIDOCAINE HYDROCHLORIDE 10 MG/ML
4 INJECTION, SOLUTION INFILTRATION; PERINEURAL
Status: COMPLETED | OUTPATIENT
Start: 2024-04-12 | End: 2024-04-12

## 2024-04-13 RX ORDER — TRIAMCINOLONE ACETONIDE 40 MG/ML
40 INJECTION, SUSPENSION INTRA-ARTICULAR; INTRAMUSCULAR
Status: COMPLETED | OUTPATIENT
Start: 2024-04-12 | End: 2024-04-12

## 2024-06-18 ENCOUNTER — OFFICE VISIT (OUTPATIENT)
Dept: URGENT CARE | Facility: CLINIC | Age: 59
End: 2024-06-18
Payer: COMMERCIAL

## 2024-06-18 VITALS
HEIGHT: 64 IN | RESPIRATION RATE: 18 BRPM | WEIGHT: 268 LBS | OXYGEN SATURATION: 96 % | DIASTOLIC BLOOD PRESSURE: 86 MMHG | HEART RATE: 98 BPM | TEMPERATURE: 98.5 F | SYSTOLIC BLOOD PRESSURE: 148 MMHG | BODY MASS INDEX: 45.75 KG/M2

## 2024-06-18 DIAGNOSIS — J04.0 ACUTE LARYNGITIS: Primary | ICD-10-CM

## 2024-06-18 LAB
ATRIAL RATE: 87 BPM
P AXIS: 33 DEGREES
PR INTERVAL: 170 MS
QRS AXIS: 17 DEGREES
QRSD INTERVAL: 84 MS
QT INTERVAL: 358 MS
QTC INTERVAL: 430 MS
T WAVE AXIS: -8 DEGREES
VENTRICULAR RATE: 87 BPM

## 2024-06-18 PROCEDURE — 99213 OFFICE O/P EST LOW 20 MIN: CPT | Performed by: PHYSICIAN ASSISTANT

## 2024-06-18 PROCEDURE — 93005 ELECTROCARDIOGRAM TRACING: CPT | Performed by: PHYSICIAN ASSISTANT

## 2024-06-18 PROCEDURE — 93010 ELECTROCARDIOGRAM REPORT: CPT | Performed by: INTERNAL MEDICINE

## 2024-06-18 RX ORDER — BENZONATATE 200 MG/1
200 CAPSULE ORAL 3 TIMES DAILY PRN
Qty: 20 CAPSULE | Refills: 0 | Status: SHIPPED | OUTPATIENT
Start: 2024-06-18

## 2024-06-18 NOTE — PROGRESS NOTES
[facility]        NAME: Thuy Allan is a 59 y.o. female  : 1965    MRN: [id number]  DATE: 2024  TIME: 3:15 PM    Assessment and Plan   Acute laryngitis [J04.0]  1. Acute laryngitis  benzonatate (TESSALON) 200 MG capsule          Refused inhaler as she has one at home.     Patient Instructions       Follow up with PCP in 3-5 days.  Proceed to  ER if symptoms worsen.    If tests have been performed at [facility], our office will contact you with results if changes need to be made to the care plan discussed with you at the visit.  You can review your full results on [facility].    Chief Complaint     Chief Complaint   Patient presents with    Sore Throat     Pt reports sore throat and non-productive with onset of symptoms three days ago. Managing with Mucinex, Tylenol/motrin, and afrin. Possible fever with onset of symptoms. C/o heart palpitations with coughing episodes. Denies any c/p. Denies any sob.          History of Present Illness       Patient presents with a cough, sore throat, loss of voice for the past 3 days. Had a low grade fever that resolved.   Gets coughing fits that cause heart palpitations.  Palpitations described as feeling heart beating.   Denies chest pains, SOB, dyspnea or  palpations at rest.   Granddaughter sick with similar symptoms.   Has an inhaler at home she hasn't used.     Sore Throat   Associated symptoms include coughing. Pertinent negatives include no congestion, ear discharge, ear pain or shortness of breath.       Review of Systems   Review of Systems   Constitutional:  Negative for chills, fatigue and fever.   HENT:  Positive for sore throat and voice change. Negative for congestion, ear discharge, ear pain, postnasal drip, rhinorrhea, sinus pressure and sinus pain.    Respiratory:  Positive for cough. Negative for chest tightness, shortness of breath and wheezing.    Cardiovascular:  Positive for palpitations. Negative for chest pain.    Musculoskeletal:  Negative for arthralgias and myalgias.   Neurological:  Negative for weakness.   Psychiatric/Behavioral:  Negative for confusion.          Current Medications       Current Outpatient Medications:     benzonatate (TESSALON) 200 MG capsule, Take 1 capsule (200 mg total) by mouth 3 (three) times a day as needed for cough, Disp: 20 capsule, Rfl: 0    Coenzyme Q10 (CO Q 10 PO), Take by mouth daily, Disp: , Rfl:     levothyroxine 75 mcg tablet, TAKE 1 TABLET BY MOUTH EVERY DAY, Disp: 90 tablet, Rfl: 1    Multiple Vitamin (MULTIVITAMIN ADULT PO), Take by mouth daily, Disp: , Rfl:     Omega-3 Fatty Acids (FISH OIL PO), Take by mouth daily, Disp: , Rfl:     atorvastatin (LIPITOR) 20 mg tablet, Take 1 tablet (20 mg total) by mouth daily, Disp: 90 tablet, Rfl: 1    loratadine (CLARITIN) 10 mg tablet, Take 10 mg by mouth daily, Disp: , Rfl:     Current Allergies     Allergies as of 2024 - Reviewed 2024   Allergen Reaction Noted    Molds & smuts Other (See Comments) 2021            The following portions of the patient's history were reviewed and updated as appropriate: allergies, current medications, past family history, past medical history, past social history, past surgical history and problem list.     Past Medical History:   Diagnosis Date    Arthritis     Basal cell carcinoma     Per patient , had a BCC on her left upper arm 6 years ago that was removed by the MOHS procedure     Disease of thyroid gland     Herpes zoster     Right, at the T6 dermatome. last assessed: 13    Hypercholesteremia     Iron deficiency anemia 2017    Obesity        Past Surgical History:   Procedure Laterality Date    BREAST BIOPSY Left 2019    US guided    BREAST BIOPSY Left 2023     SECTION      CHOLECYSTECTOMY      MOHS SURGERY      Per patient, had the MOHS procedure for a BCC left upper arm 6 years ago    US GUIDED BREAST BIOPSY LEFT COMPLETE Left 2019    US GUIDED  "BREAST BIOPSY LEFT COMPLETE Left 9/26/2023       Family History   Problem Relation Age of Onset    Dementia Mother     Diabetes Father     Diabetes Sister     Thyroid disease Sister         Disorder    No Known Problems Sister     No Known Problems Sister     No Known Problems Daughter     No Known Problems Maternal Grandmother     No Known Problems Maternal Grandfather     No Known Problems Paternal Grandmother     No Known Problems Paternal Grandfather     Autoimmune disease Brother         Disorder    Diabetes Brother     Thyroid disease Brother     No Known Problems Brother     Breast cancer Maternal Aunt         unknown age of onset    No Known Problems Maternal Aunt     No Known Problems Maternal Aunt     No Known Problems Maternal Aunt     No Known Problems Paternal Aunt          Medications have been verified.        Objective   /86 (BP Location: Right arm, Patient Position: Sitting)   Pulse 98   Temp 98.5 °F (36.9 °C)   Resp 18   Ht 5' 4\" (1.626 m)   Wt 122 kg (268 lb)   LMP  (LMP Unknown)   SpO2 96%   BMI 46.00 kg/m²   No LMP recorded (lmp unknown). Patient is postmenopausal.       Physical Exam     Physical Exam  Constitutional:       General: She is not in acute distress.     Appearance: Normal appearance. She is not ill-appearing or diaphoretic.   HENT:      Right Ear: Tympanic membrane, ear canal and external ear normal.      Left Ear: Tympanic membrane, ear canal and external ear normal.      Nose: Nose normal.      Mouth/Throat:      Mouth: Mucous membranes are moist.      Pharynx: Oropharynx is clear.   Eyes:      Conjunctiva/sclera: Conjunctivae normal.   Cardiovascular:      Rate and Rhythm: Normal rate and regular rhythm.      Heart sounds: Normal heart sounds.   Pulmonary:      Effort: Pulmonary effort is normal.      Breath sounds: Normal breath sounds.   Skin:     General: Skin is warm and dry.   Neurological:      Mental Status: She is alert.   Psychiatric:         Mood and " Affect: Mood normal.         Behavior: Behavior normal.

## 2024-06-21 ENCOUNTER — TELEMEDICINE (OUTPATIENT)
Dept: FAMILY MEDICINE CLINIC | Facility: CLINIC | Age: 59
End: 2024-06-21
Payer: COMMERCIAL

## 2024-06-21 ENCOUNTER — TELEPHONE (OUTPATIENT)
Age: 59
End: 2024-06-21

## 2024-06-21 DIAGNOSIS — J01.00 ACUTE NON-RECURRENT MAXILLARY SINUSITIS: Primary | ICD-10-CM

## 2024-06-21 PROCEDURE — 99213 OFFICE O/P EST LOW 20 MIN: CPT | Performed by: FAMILY MEDICINE

## 2024-06-21 RX ORDER — AMOXICILLIN AND CLAVULANATE POTASSIUM 875; 125 MG/1; MG/1
1 TABLET, FILM COATED ORAL EVERY 12 HOURS SCHEDULED
Qty: 10 TABLET | Refills: 0 | Status: SHIPPED | OUTPATIENT
Start: 2024-06-21 | End: 2024-06-26

## 2024-06-21 NOTE — TELEPHONE ENCOUNTER
Please advise, no available appointments on the schedule for today, patient is reporting she has not slept for the past 5 days, she believes she has a sinus infection and previously dx laryngitis, she does not feel safe driving as she hasn't slept and is requesting a possible antibiotic to help relieve her symptoms which include, cough, mucus that is non-productive, post nasal drip.

## 2024-06-21 NOTE — PROGRESS NOTES
Virtual Regular Visit    Verification of patient location:    Patient is located at Home in the following state in which I hold an active license [place]      Assessment/Plan:    Problem List Items Addressed This Visit        Respiratory    Acute non-recurrent maxillary sinusitis - Primary     Patient with signs and symptoms consistent with bilateral maxillary sinusitis. Suspect bacterial etiology given persistence and progression of symptoms. Recommend treatment with 5 day course of Augmentin.    Recommend continued conservative management including increased rest and hydration.     Follow up in 1-2 weeks in person or sooner as needed should symptoms persist or worsen         Relevant Medications    amoxicillin-clavulanate (AUGMENTIN) 875-125 mg per tablet              Reason for visit is   Chief Complaint   Patient presents with   • Virtual Regular Visit          Encounter provider Angela Abad DO      Recent Visits  Date Type Provider Dept   06/21/24 Telemedicine Angela Abad DO [facility]   Showing recent visits within past 7 days and meeting all other requirements  Future Appointments  No visits were found meeting these conditions.  Showing future appointments within next 150 days and meeting all other requirements       The patient was identified by name and date of birth. Thuy Allan was informed that this is a telemedicine visit and that the visit is being conducted through the Epic Embedded platform. She agrees to proceed..  My office door was closed. No one else was in the room.  She acknowledged consent and understanding of privacy and security of the video platform. The patient has agreed to participate and understands they can discontinue the visit at any time.    Patient is aware this is a billable service.     Subjective  Thuy Allan is a 59 y.o. female with PMHx of seasonal allergies, GERD, prediabetes, and HLD presenting with concern for possible sinus infection.       Patient was  seen at [facility] on  and treated for suspected laryngitis, treated symptomatically with Tessalon Perrles PRN for cough without significant relief. Patient notes that she initially started with symptoms approximately 8 days ago. Notes significant nasal congestion and post-nasal drip with exacerbation of cough preventing her from sleep over the past 5 days. She notes progressive worsening of symptoms. She notes that she started with a low grade fever which she has not noted since the start of illness as she is taking ibuprofen and Tylenol around the clock. Reports bilateral facial pain and itchy ears.          Past Medical History:   Diagnosis Date   • Arthritis    • Basal cell carcinoma     Per patient , had a BCC on her left upper arm 6 years ago that was removed by the MOHS procedure    • Disease of thyroid gland    • Herpes zoster     Right, at the T6 dermatome. last assessed: 13   • Hypercholesteremia    • Iron deficiency anemia 2017   • Obesity        Past Surgical History:   Procedure Laterality Date   • BREAST BIOPSY Left 2019    US guided   • BREAST BIOPSY Left 2023   •  SECTION     • CHOLECYSTECTOMY     • MOHS SURGERY      Per patient, had the MOHS procedure for a BCC left upper arm 6 years ago   • US GUIDED BREAST BIOPSY LEFT COMPLETE Left 2019   • US GUIDED BREAST BIOPSY LEFT COMPLETE Left 2023       Current Outpatient Medications   Medication Sig Dispense Refill   • amoxicillin-clavulanate (AUGMENTIN) 875-125 mg per tablet Take 1 tablet by mouth every 12 (twelve) hours for 5 days 10 tablet 0   • atorvastatin (LIPITOR) 20 mg tablet Take 1 tablet (20 mg total) by mouth daily 90 tablet 1   • benzonatate (TESSALON) 200 MG capsule Take 1 capsule (200 mg total) by mouth 3 (three) times a day as needed for cough 20 capsule 0   • Coenzyme Q10 (CO Q 10 PO) Take by mouth daily     • levothyroxine 75 mcg tablet TAKE 1 TABLET BY MOUTH EVERY DAY 90 tablet 1   • loratadine  (CLARITIN) 10 mg tablet Take 10 mg by mouth daily     • Multiple Vitamin (MULTIVITAMIN ADULT PO) Take by mouth daily     • Omega-3 Fatty Acids (FISH OIL PO) Take by mouth daily       No current facility-administered medications for this visit.        Allergies   Allergen Reactions   • Molds & Smuts Other (See Comments)     congestion       Review of Systems   Constitutional:  Negative for chills and fever.   HENT:  Positive for congestion, postnasal drip and sinus pain. Negative for rhinorrhea.    Respiratory:  Positive for cough. Negative for shortness of breath.    Cardiovascular:  Negative for chest pain.       Video Exam    There were no vitals filed for this visit.    Physical Exam  Constitutional:       General: She is not in acute distress.     Appearance: Normal appearance. She is not ill-appearing.   HENT:      Head: Normocephalic.      Right Ear: External ear normal.      Left Ear: External ear normal.   Eyes:      General: No scleral icterus.        Right eye: No discharge.         Left eye: No discharge.      Conjunctiva/sclera: Conjunctivae normal.   Pulmonary:      Effort: Pulmonary effort is normal. No respiratory distress.   Musculoskeletal:      Cervical back: Normal range of motion.   Neurological:      Mental Status: She is alert.   Psychiatric:         Mood and Affect: Mood normal.         Behavior: Behavior normal.          Visit Time  Total Visit Duration: 15

## 2024-06-22 PROBLEM — J01.00 ACUTE NON-RECURRENT MAXILLARY SINUSITIS: Status: ACTIVE | Noted: 2024-06-22

## 2024-06-22 NOTE — ASSESSMENT & PLAN NOTE
Patient with signs and symptoms consistent with bilateral maxillary sinusitis. Suspect bacterial etiology given persistence and progression of symptoms. Recommend treatment with 5 day course of Augmentin.    Recommend continued conservative management including increased rest and hydration.     Follow up in 1-2 weeks in person or sooner as needed should symptoms persist or worsen

## 2024-07-12 ENCOUNTER — OFFICE VISIT (OUTPATIENT)
Dept: OBGYN CLINIC | Facility: CLINIC | Age: 59
End: 2024-07-12
Payer: COMMERCIAL

## 2024-07-12 VITALS
WEIGHT: 276 LBS | HEIGHT: 64 IN | SYSTOLIC BLOOD PRESSURE: 152 MMHG | BODY MASS INDEX: 47.12 KG/M2 | DIASTOLIC BLOOD PRESSURE: 79 MMHG | HEART RATE: 96 BPM

## 2024-07-12 DIAGNOSIS — M17.0 PRIMARY OSTEOARTHRITIS OF BOTH KNEES: Primary | ICD-10-CM

## 2024-07-12 PROCEDURE — 20610 DRAIN/INJ JOINT/BURSA W/O US: CPT | Performed by: STUDENT IN AN ORGANIZED HEALTH CARE EDUCATION/TRAINING PROGRAM

## 2024-07-12 PROCEDURE — 99213 OFFICE O/P EST LOW 20 MIN: CPT | Performed by: STUDENT IN AN ORGANIZED HEALTH CARE EDUCATION/TRAINING PROGRAM

## 2024-07-12 RX ADMIN — LIDOCAINE HYDROCHLORIDE 4 ML: 10 INJECTION, SOLUTION INFILTRATION; PERINEURAL at 15:15

## 2024-07-12 RX ADMIN — TRIAMCINOLONE ACETONIDE 40 MG: 40 INJECTION, SUSPENSION INTRA-ARTICULAR; INTRAMUSCULAR at 15:15

## 2024-07-12 NOTE — PROGRESS NOTES
Knee New Office Note    Assessment:   No diagnosis found.    Plan:     Problem List Items Addressed This Visit    None     Thuy presents today for follow up for bilateral knee primary osteoarthritis. She had previous bilateral cortisone injections performed on 2024. She continues to get significant symptom relief from the cortisone injections. Repeat bilateral cortisone injections were administered at today's visit. The patient tolerated the procedure well. I explained these injections can be repeated every 3 months. She would like to make an appointment for 3 months.    Subjective:     Patient ID: Thuy Allan is a 59 y.o. female.  Chief Complaint:  HPI:  59 y.o. female presents today for follow up for bilateral knee primary osteoarthritis. She states she is doing well overall at this time. She has increased pain in both knees over the last month. Her pain is over the medial aspect of both knees. She has previously received bilateral cortisone injections with her last performed on 2024. She is here today for repeat cortisone injections bilaterally.       Allergy:  Allergies   Allergen Reactions    Molds & Smuts Other (See Comments)     congestion     Medications:  all current active meds have been reviewed  Past Medical History:  Past Medical History:   Diagnosis Date    Arthritis     Basal cell carcinoma     Per patient , had a BCC on her left upper arm 6 years ago that was removed by the MOHS procedure     Disease of thyroid gland     Herpes zoster     Right, at the T6 dermatome. last assessed: 13    Hypercholesteremia     Iron deficiency anemia 2017    Obesity      Past Surgical History:  Past Surgical History:   Procedure Laterality Date    BREAST BIOPSY Left 2019    US guided    BREAST BIOPSY Left 2023     SECTION      CHOLECYSTECTOMY      MOHS SURGERY      Per patient, had the MOHS procedure for a BCC left upper arm 6 years ago    US GUIDED BREAST BIOPSY LEFT  COMPLETE Left 11/07/2019    US GUIDED BREAST BIOPSY LEFT COMPLETE Left 9/26/2023     Family History:  Family History   Problem Relation Age of Onset    Dementia Mother     Diabetes Father     Diabetes Sister     Thyroid disease Sister         Disorder    No Known Problems Sister     No Known Problems Sister     No Known Problems Daughter     No Known Problems Maternal Grandmother     No Known Problems Maternal Grandfather     No Known Problems Paternal Grandmother     No Known Problems Paternal Grandfather     Autoimmune disease Brother         Disorder    Diabetes Brother     Thyroid disease Brother     No Known Problems Brother     Breast cancer Maternal Aunt         unknown age of onset    No Known Problems Maternal Aunt     No Known Problems Maternal Aunt     No Known Problems Maternal Aunt     No Known Problems Paternal Aunt      Social History:  Social History     Substance and Sexual Activity   Alcohol Use No     Social History     Substance and Sexual Activity   Drug Use No     Social History     Tobacco Use   Smoking Status Never    Passive exposure: Past   Smokeless Tobacco Never           ROS:  General: Per HPI  Skin: Negative, except if noted below  HEENT: Negative  Respiratory: Negative  Cardiovascular: Negative  Gastrointestinal: Negative  Urinary: Negative  Vascular: Negative  Musculoskeletal: Positive per HPI   Neurologic: Positive per HPI  Endocrine: Negative    Objective:  BP Readings from Last 1 Encounters:   07/12/24 152/79      Wt Readings from Last 1 Encounters:   07/12/24 125 kg (276 lb)        Respiratory:   non-labored respirations    Lymphatics:  no palpable lymph nodes    Gait:   Antalgic    Neurologic:   Alert and oriented times 3  Patient with normal sensation except as noted below  Deep tendon reflexes 2+ except as noted in MSK exam    Bilateral Lower Extremity:  Left Knee:      Inspection:  skin intact    Overall limb alignment varus    Effusion: none    ROM full with pain    Extensor  "Lag: none    Palpation: medial Joint line tenderness to palpation    AP Stability at 90 deg stable    M/L stability in full extension stable    M/L stability in midflexion stable    Motor: 5/5 Q/HS/TA/GS/P    Pulses: 2+ DP / 2+ PT    SILT DP/SP/S/S/TN    Right knee:     Inspection:  skin intact    Overall limb alignment varus    Effusion: none    ROM full with pain    Extensor Lag: none    Palpation: medial Joint line tenderness to palpation    AP Stability at 90 deg stable    M/L stability in full extension stable    M/L stability in midflexion stable    Motor: 5/5 Q/HS/TA/GS/P    Pulses: 2+ DP / 2+ PT    SILT DP/SP/S/S/TN    Imaging:  No new imaging today.     BMI:   Estimated body mass index is 47.38 kg/m² as calculated from the following:    Height as of this encounter: 5' 4\" (1.626 m).    Weight as of this encounter: 125 kg (276 lb).  BSA:   Estimated body surface area is 2.24 meters squared as calculated from the following:    Height as of this encounter: 5' 4\" (1.626 m).    Weight as of this encounter: 125 kg (276 lb).         Large joint arthrocentesis: R knee  Universal Protocol:  Consent: Verbal consent obtained.  Risks and benefits: risks, benefits and alternatives were discussed  Consent given by: patient  Time out: Immediately prior to procedure a \"time out\" was called to verify the correct patient, procedure, equipment, support staff and site/side marked as required.  Timeout called at: 7/12/2024 3:43 PM.  Patient understanding: patient states understanding of the procedure being performed  Patient consent: the patient's understanding of the procedure matches consent given  Site marked: the operative site was marked  Patient identity confirmed: verbally with patient  Supporting Documentation  Indications: pain   Procedure Details  Location: knee - R knee  Needle size: 22 G  Ultrasound guidance: no  Approach: anterolateral  Medications administered: 4 mL lidocaine 1 %; 40 mg triamcinolone acetonide 40 " "mg/mL        Large joint arthrocentesis: L knee  Universal Protocol:  Consent: Verbal consent obtained.  Risks and benefits: risks, benefits and alternatives were discussed  Consent given by: patient  Time out: Immediately prior to procedure a \"time out\" was called to verify the correct patient, procedure, equipment, support staff and site/side marked as required.  Timeout called at: 7/12/2024 3:44 PM.  Patient understanding: patient states understanding of the procedure being performed  Patient consent: the patient's understanding of the procedure matches consent given  Site marked: the operative site was marked  Patient identity confirmed: verbally with patient  Supporting Documentation  Indications: pain   Procedure Details  Location: knee - L knee  Needle size: 22 G  Ultrasound guidance: no  Approach: anterolateral  Medications administered: 4 mL lidocaine 1 %; 40 mg triamcinolone acetonide 40 mg/mL             Scribe Attestation      I,:  Carlos Obregon am acting as a scribe while in the presence of the attending physician.:       I,:  Errol Bundy, DO personally performed the services described in this documentation    as scribed in my presence.:              "

## 2024-07-14 RX ORDER — LIDOCAINE HYDROCHLORIDE 10 MG/ML
4 INJECTION, SOLUTION INFILTRATION; PERINEURAL
Status: COMPLETED | OUTPATIENT
Start: 2024-07-12 | End: 2024-07-12

## 2024-07-14 RX ORDER — TRIAMCINOLONE ACETONIDE 40 MG/ML
40 INJECTION, SUSPENSION INTRA-ARTICULAR; INTRAMUSCULAR
Status: COMPLETED | OUTPATIENT
Start: 2024-07-12 | End: 2024-07-12

## 2024-07-22 PROBLEM — J01.00 ACUTE NON-RECURRENT MAXILLARY SINUSITIS: Status: RESOLVED | Noted: 2024-06-22 | Resolved: 2024-07-22

## 2024-08-29 DIAGNOSIS — E03.9 ACQUIRED HYPOTHYROIDISM: ICD-10-CM

## 2024-08-29 RX ORDER — LEVOTHYROXINE SODIUM 75 UG/1
75 TABLET ORAL DAILY
Qty: 90 TABLET | Refills: 1 | Status: SHIPPED | OUTPATIENT
Start: 2024-08-29

## 2024-10-18 ENCOUNTER — OFFICE VISIT (OUTPATIENT)
Dept: OBGYN CLINIC | Facility: CLINIC | Age: 59
End: 2024-10-18
Payer: COMMERCIAL

## 2024-10-18 VITALS
HEART RATE: 91 BPM | WEIGHT: 270 LBS | SYSTOLIC BLOOD PRESSURE: 145 MMHG | DIASTOLIC BLOOD PRESSURE: 87 MMHG | BODY MASS INDEX: 46.1 KG/M2 | HEIGHT: 64 IN

## 2024-10-18 DIAGNOSIS — M17.0 PRIMARY OSTEOARTHRITIS OF BOTH KNEES: Primary | ICD-10-CM

## 2024-10-18 PROCEDURE — 99212 OFFICE O/P EST SF 10 MIN: CPT | Performed by: STUDENT IN AN ORGANIZED HEALTH CARE EDUCATION/TRAINING PROGRAM

## 2024-10-18 PROCEDURE — 20610 DRAIN/INJ JOINT/BURSA W/O US: CPT | Performed by: STUDENT IN AN ORGANIZED HEALTH CARE EDUCATION/TRAINING PROGRAM

## 2024-10-18 RX ORDER — TRIAMCINOLONE ACETONIDE 40 MG/ML
20 INJECTION, SUSPENSION INTRA-ARTICULAR; INTRAMUSCULAR
Status: COMPLETED | OUTPATIENT
Start: 2024-10-18 | End: 2024-10-18

## 2024-10-18 RX ORDER — LIDOCAINE HYDROCHLORIDE 10 MG/ML
4 INJECTION, SOLUTION INFILTRATION; PERINEURAL
Status: COMPLETED | OUTPATIENT
Start: 2024-10-18 | End: 2024-10-18

## 2024-10-18 RX ADMIN — LIDOCAINE HYDROCHLORIDE 4 ML: 10 INJECTION, SOLUTION INFILTRATION; PERINEURAL at 15:00

## 2024-10-18 RX ADMIN — TRIAMCINOLONE ACETONIDE 20 MG: 40 INJECTION, SUSPENSION INTRA-ARTICULAR; INTRAMUSCULAR at 15:00

## 2024-10-18 NOTE — PROGRESS NOTES
Large joint arthrocentesis: bilateral knee  Universal Protocol:  Consent: Verbal consent obtained.  Risks and benefits: risks, benefits and alternatives were discussed  Consent given by: patient  Patient understanding: patient states understanding of the procedure being performed  Patient identity confirmed: verbally with patient  Supporting Documentation  Indications: pain   Procedure Details  Location: knee - bilateral knee  Needle size: 22 G  Ultrasound guidance: no  Approach: lateral    Medications (Right): 4 mL lidocaine 1 %; 20 mg triamcinolone acetonide 40 mg/mLMedications (Left): 4 mL lidocaine 1 %; 20 mg triamcinolone acetonide 40 mg/mL   Patient tolerance: patient tolerated the procedure well with no immediate complications      We will see the patient back in 3 months to consider repeat bilateral knee CSIs.

## 2025-01-07 ENCOUNTER — APPOINTMENT (OUTPATIENT)
Dept: RADIOLOGY | Facility: HOSPITAL | Age: 60
End: 2025-01-07
Payer: COMMERCIAL

## 2025-01-07 ENCOUNTER — HOSPITAL ENCOUNTER (EMERGENCY)
Facility: HOSPITAL | Age: 60
Discharge: HOME | End: 2025-01-08
Attending: EMERGENCY MEDICINE | Admitting: EMERGENCY MEDICINE
Payer: COMMERCIAL

## 2025-01-07 ENCOUNTER — APPOINTMENT (EMERGENCY)
Dept: MRI IMAGING | Facility: HOSPITAL | Age: 60
End: 2025-01-07
Payer: COMMERCIAL

## 2025-01-07 DIAGNOSIS — R07.9 CHEST PAIN, UNSPECIFIED: ICD-10-CM

## 2025-01-07 DIAGNOSIS — R50.9 FEVER: Primary | ICD-10-CM

## 2025-01-07 DIAGNOSIS — M54.6 ACUTE THORACIC BACK PAIN: ICD-10-CM

## 2025-01-07 LAB
2HR DELTA HS TROPONIN: 0 NG/L
ALBUMIN SERPL BCG-MCNC: 4.2 G/DL (ref 3.5–5)
ALP SERPL-CCNC: 61 U/L (ref 34–104)
ALT SERPL W P-5'-P-CCNC: 31 U/L (ref 7–52)
ANION GAP SERPL CALCULATED.3IONS-SCNC: 10 MMOL/L (ref 4–13)
APTT PPP: 28 SECONDS (ref 23–34)
AST SERPL W P-5'-P-CCNC: 27 U/L (ref 13–39)
BASOPHILS # BLD AUTO: 0.01 THOUSANDS/ΜL (ref 0–0.1)
BASOPHILS NFR BLD AUTO: 0 % (ref 0–1)
BILIRUB SERPL-MCNC: 0.39 MG/DL (ref 0.2–1)
BILIRUB UR QL STRIP: NEGATIVE
BUN SERPL-MCNC: 11 MG/DL (ref 5–25)
CALCIUM SERPL-MCNC: 10 MG/DL (ref 8.4–10.2)
CARDIAC TROPONIN I PNL SERPL HS: 3 NG/L (ref ?–50)
CARDIAC TROPONIN I PNL SERPL HS: 3 NG/L (ref ?–50)
CHLORIDE SERPL-SCNC: 103 MMOL/L (ref 96–108)
CLARITY UR: NORMAL
CO2 SERPL-SCNC: 22 MMOL/L (ref 21–32)
COLOR UR: YELLOW
CREAT SERPL-MCNC: 0.58 MG/DL (ref 0.6–1.3)
EOSINOPHIL # BLD AUTO: 0.14 THOUSAND/ΜL (ref 0–0.61)
EOSINOPHIL NFR BLD AUTO: 2 % (ref 0–6)
ERYTHROCYTE [DISTWIDTH] IN BLOOD BY AUTOMATED COUNT: 13 % (ref 11.6–15.1)
ERYTHROCYTE [SEDIMENTATION RATE] IN BLOOD: 43 MM/HOUR (ref 0–29)
FLUAV AG UPPER RESP QL IA.RAPID: NEGATIVE
FLUBV AG UPPER RESP QL IA.RAPID: NEGATIVE
GFR SERPL CREATININE-BSD FRML MDRD: 101 ML/MIN/1.73SQ M
GLUCOSE SERPL-MCNC: 130 MG/DL (ref 65–140)
GLUCOSE UR STRIP-MCNC: NEGATIVE MG/DL
HCT VFR BLD AUTO: 41.3 % (ref 34.8–46.1)
HGB BLD-MCNC: 13.5 G/DL (ref 11.5–15.4)
HGB UR QL STRIP.AUTO: NEGATIVE
IMM GRANULOCYTES # BLD AUTO: 0.07 THOUSAND/UL (ref 0–0.2)
IMM GRANULOCYTES NFR BLD AUTO: 1 % (ref 0–2)
INR PPP: 0.98 (ref 0.85–1.19)
KETONES UR STRIP-MCNC: NEGATIVE MG/DL
LACTATE SERPL-SCNC: 1.6 MMOL/L (ref 0.5–2)
LACTATE SERPL-SCNC: 2.3 MMOL/L (ref 0.5–2)
LEUKOCYTE ESTERASE UR QL STRIP: NEGATIVE
LIPASE SERPL-CCNC: 37 U/L (ref 11–82)
LYMPHOCYTES # BLD AUTO: 0.4 THOUSANDS/ΜL (ref 0.6–4.47)
LYMPHOCYTES NFR BLD AUTO: 5 % (ref 14–44)
MCH RBC QN AUTO: 30.5 PG (ref 26.8–34.3)
MCHC RBC AUTO-ENTMCNC: 32.7 G/DL (ref 31.4–37.4)
MCV RBC AUTO: 93 FL (ref 82–98)
MONOCYTES # BLD AUTO: 0.69 THOUSAND/ΜL (ref 0.17–1.22)
MONOCYTES NFR BLD AUTO: 8 % (ref 4–12)
NEUTROPHILS # BLD AUTO: 7.04 THOUSANDS/ΜL (ref 1.85–7.62)
NEUTS SEG NFR BLD AUTO: 84 % (ref 43–75)
NITRITE UR QL STRIP: NEGATIVE
NRBC BLD AUTO-RTO: 0 /100 WBCS
PH UR STRIP.AUTO: 6.5 [PH]
PLATELET # BLD AUTO: 249 THOUSANDS/UL (ref 149–390)
PMV BLD AUTO: 10.1 FL (ref 8.9–12.7)
POTASSIUM SERPL-SCNC: 4.2 MMOL/L (ref 3.5–5.3)
PROCALCITONIN SERPL-MCNC: 0.08 NG/ML
PROT SERPL-MCNC: 7 G/DL (ref 6.4–8.4)
PROT UR STRIP-MCNC: NEGATIVE MG/DL
PROTHROMBIN TIME: 13.5 SECONDS (ref 12.3–15)
RBC # BLD AUTO: 4.42 MILLION/UL (ref 3.81–5.12)
SARS-COV+SARS-COV-2 AG RESP QL IA.RAPID: NEGATIVE
SODIUM SERPL-SCNC: 135 MMOL/L (ref 135–147)
SP GR UR STRIP.AUTO: 1.01 (ref 1–1.03)
UROBILINOGEN UR STRIP-ACNC: <2 MG/DL
WBC # BLD AUTO: 8.35 THOUSAND/UL (ref 4.31–10.16)

## 2025-01-07 PROCEDURE — 85025 COMPLETE CBC W/AUTO DIFF WBC: CPT

## 2025-01-07 PROCEDURE — 93005 ELECTROCARDIOGRAM TRACING: CPT

## 2025-01-07 PROCEDURE — 99285 EMERGENCY DEPT VISIT HI MDM: CPT

## 2025-01-07 PROCEDURE — 81003 URINALYSIS AUTO W/O SCOPE: CPT

## 2025-01-07 PROCEDURE — 96361 HYDRATE IV INFUSION ADD-ON: CPT

## 2025-01-07 PROCEDURE — 85730 THROMBOPLASTIN TIME PARTIAL: CPT

## 2025-01-07 PROCEDURE — 84145 PROCALCITONIN (PCT): CPT

## 2025-01-07 PROCEDURE — 99285 EMERGENCY DEPT VISIT HI MDM: CPT | Performed by: EMERGENCY MEDICINE

## 2025-01-07 PROCEDURE — 87040 BLOOD CULTURE FOR BACTERIA: CPT

## 2025-01-07 PROCEDURE — 72158 MRI LUMBAR SPINE W/O & W/DYE: CPT

## 2025-01-07 PROCEDURE — 72157 MRI CHEST SPINE W/O & W/DYE: CPT

## 2025-01-07 PROCEDURE — 71046 X-RAY EXAM CHEST 2 VIEWS: CPT

## 2025-01-07 PROCEDURE — 83605 ASSAY OF LACTIC ACID: CPT

## 2025-01-07 PROCEDURE — 83690 ASSAY OF LIPASE: CPT | Performed by: EMERGENCY MEDICINE

## 2025-01-07 PROCEDURE — 85610 PROTHROMBIN TIME: CPT

## 2025-01-07 PROCEDURE — 85652 RBC SED RATE AUTOMATED: CPT | Performed by: EMERGENCY MEDICINE

## 2025-01-07 PROCEDURE — 84484 ASSAY OF TROPONIN QUANT: CPT

## 2025-01-07 PROCEDURE — 80053 COMPREHEN METABOLIC PANEL: CPT

## 2025-01-07 PROCEDURE — 36415 COLL VENOUS BLD VENIPUNCTURE: CPT

## 2025-01-07 PROCEDURE — 87811 SARS-COV-2 COVID19 W/OPTIC: CPT

## 2025-01-07 PROCEDURE — 87804 INFLUENZA ASSAY W/OPTIC: CPT

## 2025-01-07 RX ORDER — ACETAMINOPHEN 160 MG/5ML
650 SUSPENSION ORAL ONCE
Status: DISCONTINUED | OUTPATIENT
Start: 2025-01-07 | End: 2025-01-07

## 2025-01-07 RX ORDER — ACETAMINOPHEN 325 MG/1
975 TABLET ORAL ONCE
Status: COMPLETED | OUTPATIENT
Start: 2025-01-07 | End: 2025-01-07

## 2025-01-07 RX ADMIN — ACETAMINOPHEN 975 MG: 325 TABLET, FILM COATED ORAL at 19:43

## 2025-01-07 RX ADMIN — SODIUM CHLORIDE 1000 ML: 0.9 INJECTION, SOLUTION INTRAVENOUS at 23:12

## 2025-01-08 VITALS
DIASTOLIC BLOOD PRESSURE: 79 MMHG | TEMPERATURE: 98.7 F | RESPIRATION RATE: 18 BRPM | OXYGEN SATURATION: 96 % | HEART RATE: 90 BPM | SYSTOLIC BLOOD PRESSURE: 149 MMHG

## 2025-01-08 LAB
ATRIAL RATE: 127 BPM
P AXIS: 39 DEGREES
PR INTERVAL: 166 MS
QRS AXIS: 103 DEGREES
QRSD INTERVAL: 80 MS
QT INTERVAL: 278 MS
QTC INTERVAL: 404 MS
T WAVE AXIS: 36 DEGREES
VENTRICULAR RATE: 127 BPM

## 2025-01-08 PROCEDURE — 96361 HYDRATE IV INFUSION ADD-ON: CPT

## 2025-01-08 PROCEDURE — 96374 THER/PROPH/DIAG INJ IV PUSH: CPT

## 2025-01-08 PROCEDURE — 93010 ELECTROCARDIOGRAM REPORT: CPT | Performed by: INTERNAL MEDICINE

## 2025-01-08 PROCEDURE — A9585 GADOBUTROL INJECTION: HCPCS | Performed by: FAMILY MEDICINE

## 2025-01-08 RX ORDER — KETOROLAC TROMETHAMINE 30 MG/ML
15 INJECTION, SOLUTION INTRAMUSCULAR; INTRAVENOUS ONCE
Status: COMPLETED | OUTPATIENT
Start: 2025-01-08 | End: 2025-01-08

## 2025-01-08 RX ORDER — GADOBUTROL 604.72 MG/ML
12 INJECTION INTRAVENOUS
Status: COMPLETED | OUTPATIENT
Start: 2025-01-08 | End: 2025-01-08

## 2025-01-08 RX ADMIN — GADOBUTROL 12 ML: 604.72 INJECTION INTRAVENOUS at 00:15

## 2025-01-08 RX ADMIN — KETOROLAC TROMETHAMINE 15 MG: 30 INJECTION, SOLUTION INTRAMUSCULAR; INTRAVENOUS at 01:26

## 2025-01-08 NOTE — ED PROVIDER NOTES
Time reflects when diagnosis was documented in both MDM as applicable and the Disposition within this note       Time User Action Codes Description Comment    1/8/2025 12:38 AM Jonathan Husain [R50.9] Fever     1/8/2025 12:38 AM Jonathan Husain [R07.9] Chest pain, unspecified     1/8/2025  1:18 AM Jonathan Husain [M54.6] Acute thoracic back pain           ED Disposition       ED Disposition   Discharge    Condition   Stable    Date/Time   Wed Jan 8, 2025  1:18 AM    Comment   Thuy Allan discharge to home/self care.                   Assessment & Plan       Medical Decision Making    59 y.o. female presenting for chest pain, fevers and shortness of breath.  Febrile on arrival.  Will order CBC, CMP, troponin, EKG, CXR to evaluate for anemia, sepsis, ANNE-MARIE, arrhythmia, ACS, PTX, pulmonary disease, pneumonia.  Will check LFTs and lipase to evaluate for biliary disease or pancreatitis.  Will check UA as patient is at risk for UTI.  Will check blood cultures as patient at risk for bacteremia.  Given fever, reported urinary incontinence earlier today and focal midline spinal tenderness discussed with radiology to obtain stat MRI to exclude spinal epidural abscess.    Reassessment: VSS, resting comfortably.  Reviewed labs and imaging results in detail.  MRI without evidence of spinal infection or impingement.  No evidence of pneumonia or UTI.  Ddx would include viral illness.    Disposition: I have discussed with the patient our plan to discharge them from the ED and the patient is in agreement with this plan.     Discharge Plan: encouraged oral hydration, PRN tylenol/motrin for fever or discomfort. Patient understands blood cultures are pending at time of discharge and may be contacted after discharge if abnormal. RTED precautions emphasized. The patient was provided a written after visit summary with strict RTED precautions.     Followup: I have discussed with the patient plan to follow up with their PCP.  Contact information provided in AVS.    Amount and/or Complexity of Data Reviewed  Labs: ordered.  Radiology: ordered and independent interpretation performed.    Risk  Prescription drug management.        ED Course as of 01/08/25 0343   Tue Jan 07, 2025 1933 Procedure Note: EKG  Date/Time: 01/07/25 7:33 PM   Interpreted by: Jonathan Husain DO  Indications / Diagnosis: Chest pain  ECG reviewed by me, the ED Provider: yes   The EKG demonstrates:  Rhythm: sinus tachycardia 127 BPM  Intervals: Normal VT and QT intervals  Axis: Right axis  QRS/Blocks: Normal QRS  ST Changes: No acute ST/T waves changes. No IVONE. No TWI.       Medications   acetaminophen (TYLENOL) tablet 975 mg (975 mg Oral Given 1/7/25 1943)   sodium chloride 0.9 % bolus 1,000 mL (0 mL Intravenous Stopped 1/8/25 0121)   Gadobutrol injection (SINGLE-DOSE) SOLN 12 mL (12 mL Intravenous Given 1/8/25 0015)   ketorolac (TORADOL) injection 15 mg (15 mg Intravenous Given 1/8/25 0126)       ED Risk Strat Scores   HEART Risk Score      Flowsheet Row Most Recent Value   Heart Score Risk Calculator    History 0 Filed at: 01/08/2025 0039   ECG 0 Filed at: 01/08/2025 0039   Age 1 Filed at: 01/08/2025 0039   Risk Factors 2 Filed at: 01/08/2025 0039   Troponin 0 Filed at: 01/08/2025 0039   HEART Score 3 Filed at: 01/08/2025 0039          HEART Risk Score      Flowsheet Row Most Recent Value   Heart Score Risk Calculator    History 0 Filed at: 01/08/2025 0039   ECG 0 Filed at: 01/08/2025 0039   Age 1 Filed at: 01/08/2025 0039   Risk Factors 2 Filed at: 01/08/2025 0039   Troponin 0 Filed at: 01/08/2025 0039   HEART Score 3 Filed at: 01/08/2025 0039                                                History of Present Illness       Chief Complaint   Patient presents with    Chest Pain     Two days ago chest pressure  that radiates to jaw/ left arm. Today pt has chest pressure and back pressure. Today pt has fever/chills/SOB  upon exertion.        Past Medical History:    Diagnosis Date    Arthritis     Basal cell carcinoma     Per patient , had a BCC on her left upper arm 6 years ago that was removed by the MOHS procedure     Disease of thyroid gland     Herpes zoster     Right, at the T6 dermatome. last assessed: 13    Hypercholesteremia     Iron deficiency anemia 2017    Obesity       Past Surgical History:   Procedure Laterality Date    BREAST BIOPSY Left 2019    US guided    BREAST BIOPSY Left 2023     SECTION      CHOLECYSTECTOMY      MOHS SURGERY      Per patient, had the MOHS procedure for a BCC left upper arm 6 years ago    US GUIDED BREAST BIOPSY LEFT COMPLETE Left 2019    US GUIDED BREAST BIOPSY LEFT COMPLETE Left 2023      Family History   Problem Relation Age of Onset    Dementia Mother     Diabetes Father     Diabetes Sister     Thyroid disease Sister         Disorder    No Known Problems Sister     No Known Problems Sister     No Known Problems Daughter     No Known Problems Maternal Grandmother     No Known Problems Maternal Grandfather     No Known Problems Paternal Grandmother     No Known Problems Paternal Grandfather     Autoimmune disease Brother         Disorder    Diabetes Brother     Thyroid disease Brother     No Known Problems Brother     Breast cancer Maternal Aunt         unknown age of onset    No Known Problems Maternal Aunt     No Known Problems Maternal Aunt     No Known Problems Maternal Aunt     No Known Problems Paternal Aunt       Social History     Tobacco Use    Smoking status: Never     Passive exposure: Past    Smokeless tobacco: Never   Vaping Use    Vaping status: Never Used   Substance Use Topics    Alcohol use: No    Drug use: No      E-Cigarette/Vaping    E-Cigarette Use Never User       E-Cigarette/Vaping Substances    Nicotine No     THC No     CBD No     Flavoring No     Other No     Unknown No       I have reviewed and agree with the history as documented.     Thuy Allan is a 59 y.o.  year old female with PMH of prediabetes presenting to the [facility] ED for fevers, chest pain and chills. Patient noticed nonexertional intermittent substernal chest pain two days ago which resolved spontaneously. Earlier today she had dyspnea on exertion. She is also reporting acute on chronic midline back pain. No weakness/numbness in extremities. She stood up earlier today and urinated on herself. Patient denies N/V/abdominal pain/D. Patient denies dysuria/hematuria/flank pain.Patient has taken tylenol at home for symptomatic treatment.      History provided by:  Medical records and patient   used: No    Chest Pain  Associated symptoms: back pain, fever and shortness of breath    Associated symptoms: no abdominal pain, no cough, no nausea, no numbness, not vomiting and no weakness        Review of Systems   Constitutional:  Positive for chills and fever.   HENT:  Negative for congestion, rhinorrhea and sore throat.    Respiratory:  Positive for shortness of breath. Negative for cough.    Cardiovascular:  Positive for chest pain. Negative for leg swelling.   Gastrointestinal:  Negative for abdominal pain, diarrhea, nausea and vomiting.   Genitourinary:  Positive for difficulty urinating. Negative for dysuria, flank pain and hematuria.   Musculoskeletal:  Positive for back pain.   Neurological:  Negative for weakness and numbness.   All other systems reviewed and are negative.          Objective       ED Triage Vitals   Temperature Pulse Blood Pressure Respirations SpO2 Patient Position - Orthostatic VS   01/07/25 1928 01/07/25 1928 01/07/25 1928 01/07/25 1928 01/07/25 1928 01/08/25 0113   (!) 101.5 °F (38.6 °C) (!) 129 (!) 212/107 20 97 % Lying      Temp Source Heart Rate Source BP Location FiO2 (%) Pain Score    01/07/25 1928 01/07/25 1928 01/07/25 2255 -- 01/07/25 1943    Oral Monitor Right arm  Med Not Given for Pain - for MAR use only      Vitals      Date and Time Temp Pulse SpO2 Resp BP Pain  Score FACES Pain Rating User   01/08/25 0113 -- 90 96 % 18 149/79 -- -- SH   01/07/25 2255 98.7 °F (37.1 °C) 101 96 % 20 138/73 -- -- AD   01/07/25 1943 -- -- -- -- -- Med Not Given for Pain - for MAR use only -- LK   01/07/25 1928 101.5 °F (38.6 °C) 129 97 % 20 212/107 -- -- LK            Physical Exam  Vitals and nursing note reviewed.   Constitutional:       General: She is not in acute distress.     Appearance: Normal appearance. She is well-developed. She is not ill-appearing, toxic-appearing or diaphoretic.   HENT:      Head: Normocephalic and atraumatic.      Nose: No congestion or rhinorrhea.   Eyes:      General:         Right eye: No discharge.         Left eye: No discharge.   Cardiovascular:      Rate and Rhythm: Regular rhythm. Tachycardia present.   Pulmonary:      Effort: Pulmonary effort is normal. No accessory muscle usage or respiratory distress.      Breath sounds: Normal breath sounds. No stridor. No decreased breath sounds, wheezing, rhonchi or rales.   Abdominal:      General: Bowel sounds are normal. There is no distension.      Palpations: Abdomen is soft.      Tenderness: There is no abdominal tenderness. There is no guarding or rebound.   Musculoskeletal:      Cervical back: Normal range of motion and neck supple. No rigidity.      Thoracic back: Tenderness present.      Lumbar back: No bony tenderness.        Back:       Right knee: No swelling, effusion or erythema.      Left knee: No swelling, effusion or erythema.      Right lower leg: No tenderness.      Left lower leg: No tenderness.   Skin:     Capillary Refill: Capillary refill takes less than 2 seconds.      Findings: No rash.   Neurological:      Mental Status: She is alert and oriented to person, place, and time.      GCS: GCS eye subscore is 4. GCS verbal subscore is 5. GCS motor subscore is 6.      Comments: 5/5 strength b/l UE/LE.  Sensation grossly intact throughout.     Psychiatric:         Mood and Affect: Mood normal.          Behavior: Behavior normal.         Results Reviewed       Procedure Component Value Units Date/Time    HS Troponin I 2hr [[id number]]  (Normal) Collected: 01/07/25 2309    Lab Status: Final result Specimen: Blood from Hand, Right Updated: 01/07/25 2340     hs TnI 2hr 3 ng/L      Delta 2hr hsTnI 0 ng/L     Lactic acid 2 Hours [[id number]]  (Normal) Collected: 01/07/25 2309    Lab Status: Final result Specimen: Blood from Arm, Right Updated: 01/07/25 2335     LACTIC ACID 1.6 mmol/L     Narrative:      Result may be elevated if tourniquet was used during collection.    Sedimentation rate, automated [[id number]]  (Abnormal) Collected: 01/07/25 1939    Lab Status: Final result Specimen: Blood from Arm, Right Updated: 01/07/25 2318     Sed Rate 43 mm/hour     Blood culture #1 [[id number]] Collected: 01/07/25 2309    Lab Status: In process Specimen: Blood from Arm, Right Updated: 01/07/25 2316    Lipase [[id number]]  (Normal) Collected: 01/07/25 1939    Lab Status: Final result Specimen: Blood from Arm, Right Updated: 01/07/25 2211     Lipase 37 u/L     UA w Reflex to Microscopic w Reflex to Culture [[id number]] Collected: 01/07/25 2035    Lab Status: Final result Specimen: Urine, Clean Catch Updated: 01/07/25 2042     Color, UA Yellow     Clarity, UA Slightly Cloudy     Specific Gravity, UA 1.015     pH, UA 6.5     Leukocytes, UA Negative     Nitrite, UA Negative     Protein, UA Negative mg/dl      Glucose, UA Negative mg/dl      Ketones, UA Negative mg/dl      Urobilinogen, UA <2.0 mg/dl      Bilirubin, UA Negative     Occult Blood, UA Negative    FLU/COVID Rapid Antigen (30 min. TAT) - Preferred screening test in ED [[id number]]  (Normal) Collected: 01/07/25 1945    Lab Status: Final result Specimen: Nares from Nose Updated: 01/07/25 2016     SARS COV Rapid Antigen Negative     Influenza A Rapid Antigen Negative     Influenza B Rapid Antigen Negative    Narrative:      This test has been performed using the [employer] Kenzie 2  FLU+SARS Antigen test under the Emergency Use Authorization (EUA). This test has been validated by the  and verified by the performing laboratory. The Kenzie uses lateral flow immunofluorescent sandwich assay to detect SARS-COV, Influenza A and Influenza B Antigen.     The [employer] Kenzie 2 SARS Antigen test does not differentiate between SARS-CoV and SARS-CoV-2.     Negative results are presumptive and may be confirmed with a molecular assay, if necessary, for patient management. Negative results do not rule out SARS-CoV-2 or influenza infection and should not be used as the sole basis for treatment or patient management decisions. A negative test result may occur if the level of antigen in a sample is below the limit of detection of this test.     Positive results are indicative of the presence of viral antigens, but do not rule out bacterial infection or co-infection with other viruses.     All test results should be used as an adjunct to clinical observations and other information available to the provider.    FOR PEDIATRIC PATIENTS - copy/paste COVID Guidelines URL to browser: [email or web address]    Procalcitonin [[id number]]  (Normal) Collected: 01/07/25 1939    Lab Status: Final result Specimen: Blood from Arm, Right Updated: 01/07/25 2012     Procalcitonin 0.08 ng/ml     HS Troponin 0hr (reflex protocol) [[id number]]  (Normal) Collected: 01/07/25 1939    Lab Status: Final result Specimen: Blood from Arm, Right Updated: 01/07/25 2009     hs TnI 0hr 3 ng/L     APTT [[id number]]  (Normal) Collected: 01/07/25 1939    Lab Status: Final result Specimen: Blood from Arm, Right Updated: 01/07/25 2004     PTT 28 seconds     Protime-INR [[id number]]  (Normal) Collected: 01/07/25 1939    Lab Status: Final result Specimen: Blood from Arm, Right Updated: 01/07/25 2004     Protime 13.5 seconds      INR 0.98    Narrative:      INR Therapeutic Range    Indication                                              INR Range      Atrial Fibrillation                                               2.0-3.0  Hypercoagulable State                                    2.0.2.3  Left Ventricular Asist Device                            2.0-3.0  Mechanical Heart Valve                                  -    Aortic(with afib, MI, embolism, HF, LA enlargement,    and/or coagulopathy)                                     2.0-3.0 (2.5-3.5)     Mitral                                                             2.5-3.5  Prosthetic/Bioprosthetic Heart Valve               2.0-3.0  Venous thromboembolism (VTE: VT, PE        2.0-3.0    Comprehensive metabolic panel [[id number]]  (Abnormal) Collected: 01/07/25 1939    Lab Status: Final result Specimen: Blood from Arm, Right Updated: 01/07/25 2002     Sodium 135 mmol/L      Potassium 4.2 mmol/L      Chloride 103 mmol/L      CO2 22 mmol/L      ANION GAP 10 mmol/L      BUN 11 mg/dL      Creatinine 0.58 mg/dL      Glucose 130 mg/dL      Calcium 10.0 mg/dL      AST 27 U/L      ALT 31 U/L      Alkaline Phosphatase 61 U/L      Total Protein 7.0 g/dL      Albumin 4.2 g/dL      Total Bilirubin 0.39 mg/dL      eGFR 101 ml/min/1.73sq m     Narrative:      National Kidney Disease Foundation guidelines for Chronic Kidney Disease (CKD):     Stage 1 with normal or high GFR (GFR > 90 mL/min/1.73 square meters)    Stage 2 Mild CKD (GFR = 60-89 mL/min/1.73 square meters)    Stage 3A Moderate CKD (GFR = 45-59 mL/min/1.73 square meters)    Stage 3B Moderate CKD (GFR = 30-44 mL/min/1.73 square meters)    Stage 4 Severe CKD (GFR = 15-29 mL/min/1.73 square meters)    Stage 5 End Stage CKD (GFR <15 mL/min/1.73 square meters)  Note: GFR calculation is accurate only with a steady state creatinine    Lactic acid, plasma (w/reflex if result > 2.0) [[id number]]  (Abnormal) Collected: 01/07/25 1939    Lab Status: Final result Specimen: Blood from Arm, Right Updated: 01/07/25 2001     LACTIC ACID  2.3 mmol/L     Narrative:      Result may be elevated if tourniquet was used during collection.    CBC and differential [[id number]]  (Abnormal) Collected: 01/07/25 1939    Lab Status: Final result Specimen: Blood from Arm, Right Updated: 01/07/25 1948     WBC 8.35 Thousand/uL      RBC 4.42 Million/uL      Hemoglobin 13.5 g/dL      Hematocrit 41.3 %      MCV 93 fL      MCH 30.5 pg      MCHC 32.7 g/dL      RDW 13.0 %      MPV 10.1 fL      Platelets 249 Thousands/uL      nRBC 0 /100 WBCs      Segmented % 84 %      Immature Grans % 1 %      Lymphocytes % 5 %      Monocytes % 8 %      Eosinophils Relative 2 %      Basophils Relative 0 %      Absolute Neutrophils 7.04 Thousands/µL      Absolute Immature Grans 0.07 Thousand/uL      Absolute Lymphocytes 0.40 Thousands/µL      Absolute Monocytes 0.69 Thousand/µL      Eosinophils Absolute 0.14 Thousand/µL      Basophils Absolute 0.01 Thousands/µL     Blood culture #2 [[id number]] Collected: 01/07/25 1939    Lab Status: In process Specimen: Blood from Arm, Right Updated: 01/07/25 1945            MRI lumbar spine w wo contrast   Final Interpretation by Amadeo Steele MD (01/08 0109)      Mild disc and facet degenerative change in the lower thoracic and lumbar spine without evidence of nerve root encroachment.      No evidence of discitis, osteomyelitis or epidural abscess.         Workstation performed: [id number]         MRI thoracic spine w wo contrast   Final Interpretation by Amadeo Steele MD (01/08 0105)      Mild disc degenerative change in the thoracic spine. No evidence of osteomyelitis, discitis or epidural abscess            Workstation performed: [id number]         XR chest 2 views   ED Interpretation by Jonathan Husain DO (01/07 2306)   No focal infiltrate. No acute cardiopulmonary disease.          Procedures    ED Medication and Procedure Management   Prior to Admission Medications   Prescriptions Last Dose Informant Patient Reported? Taking?    Coenzyme Q10 (CO Q 10 PO)  Self Yes No   Sig: Take by mouth daily   Multiple Vitamin (MULTIVITAMIN ADULT PO)  Self Yes No   Sig: Take by mouth daily   Omega-3 Fatty Acids (FISH OIL PO)  Self Yes No   Sig: Take by mouth daily   atorvastatin (LIPITOR) 20 mg tablet  Self No No   Sig: Take 1 tablet (20 mg total) by mouth daily   benzonatate (TESSALON) 200 MG capsule   No No   Sig: Take 1 capsule (200 mg total) by mouth 3 (three) times a day as needed for cough   levothyroxine 75 mcg tablet   No No   Sig: TAKE 1 TABLET BY MOUTH EVERY DAY   loratadine (CLARITIN) 10 mg tablet  Self Yes No   Sig: Take 10 mg by mouth daily      Facility-Administered Medications: None     Discharge Medication List as of 1/8/2025  1:18 AM        CONTINUE these medications which have NOT CHANGED    Details   atorvastatin (LIPITOR) 20 mg tablet Take 1 tablet (20 mg total) by mouth daily, Starting Tue 7/25/2023, Normal      benzonatate (TESSALON) 200 MG capsule Take 1 capsule (200 mg total) by mouth 3 (three) times a day as needed for cough, Starting Tue 6/18/2024, Normal      Coenzyme Q10 (CO Q 10 PO) Take by mouth daily, Historical Med      levothyroxine 75 mcg tablet TAKE 1 TABLET BY MOUTH EVERY DAY, Starting u 8/29/2024, Normal      loratadine (CLARITIN) 10 mg tablet Take 10 mg by mouth daily, Historical Med      Multiple Vitamin (MULTIVITAMIN ADULT PO) Take by mouth daily, Historical Med      Omega-3 Fatty Acids (FISH OIL PO) Take by mouth daily, Historical Med           No discharge procedures on file.  ED SEPSIS DOCUMENTATION   Time reflects when diagnosis was documented in both MDM as applicable and the Disposition within this note       Time User Action Codes Description Comment    1/8/2025 12:38 AM Jonathan Husain [R50.9] Fever     1/8/2025 12:38 AM Jonathan Husain [R07.9] Chest pain, unspecified     1/8/2025  1:18 AM Jonathan Husain [M54.6] Acute thoracic back pain                  Jonathan Husain,   01/08/25  [id number]

## 2025-01-08 NOTE — DISCHARGE INSTRUCTIONS
You have been seen for fevers, chest pain and back pain. Please take tylenol for discomfort. Return to the emergency department if you develop worsening pain, persistent fevers, trouble breathing or any other symptoms of concern. Please follow up with your PCP by calling the number provided.

## 2025-01-12 LAB
BACTERIA BLD CULT: NORMAL
BACTERIA BLD CULT: NORMAL

## 2025-01-13 LAB
BACTERIA BLD CULT: NORMAL
BACTERIA BLD CULT: NORMAL

## 2025-01-24 ENCOUNTER — OFFICE VISIT (OUTPATIENT)
Dept: OBGYN CLINIC | Facility: CLINIC | Age: 60
End: 2025-01-24
Payer: COMMERCIAL

## 2025-01-24 VITALS — BODY MASS INDEX: 46.1 KG/M2 | WEIGHT: 270 LBS | HEIGHT: 64 IN

## 2025-01-24 DIAGNOSIS — M25.561 CHRONIC PAIN OF BOTH KNEES: ICD-10-CM

## 2025-01-24 DIAGNOSIS — M25.562 CHRONIC PAIN OF BOTH KNEES: ICD-10-CM

## 2025-01-24 DIAGNOSIS — G89.29 CHRONIC PAIN OF BOTH KNEES: ICD-10-CM

## 2025-01-24 DIAGNOSIS — M17.0 PRIMARY OSTEOARTHRITIS OF BOTH KNEES: Primary | ICD-10-CM

## 2025-01-24 PROCEDURE — 20610 DRAIN/INJ JOINT/BURSA W/O US: CPT | Performed by: STUDENT IN AN ORGANIZED HEALTH CARE EDUCATION/TRAINING PROGRAM

## 2025-01-24 PROCEDURE — 99213 OFFICE O/P EST LOW 20 MIN: CPT | Performed by: STUDENT IN AN ORGANIZED HEALTH CARE EDUCATION/TRAINING PROGRAM

## 2025-01-24 RX ADMIN — TRIAMCINOLONE ACETONIDE 40 MG: 40 INJECTION, SUSPENSION INTRA-ARTICULAR; INTRAMUSCULAR at 14:30

## 2025-01-24 RX ADMIN — LIDOCAINE HYDROCHLORIDE 4 ML: 10 INJECTION, SOLUTION INFILTRATION; PERINEURAL at 14:30

## 2025-01-24 NOTE — PROGRESS NOTES
Knee New Office Note    Assessment:     1. Primary osteoarthritis of both knees    2. Chronic pain of both knees        Plan:     Problem List Items Addressed This Visit          Surgery/Wound/Pain    Knee pain, bilateral     Other Visit Diagnoses         Primary osteoarthritis of both knees    -  Primary           Findings today are consistent with bilateral knee osteoarthritis. Imaging and prognosis was reviewed with the patient today. Discussed treatment options including continued observation, low impact exercises, bracing, anti-inflammatories, physical therapy, cortisone injection, visco injection, versus surgical intervention. Cortisone steroid injection was administered to bilateral knees today. Patient should avoid strenuous activities for the next 1-2 days. Patient should avoid vaccines for the next 2 weeks if possible. Can apply cold compress for soreness. If patient feels relief with the cortisone injections, procedure can be repeated every 3 months. Follow up in 3 months.    Subjective:     Patient ID: Thuy Allan is a 59 y.o. female.  Chief Complaint:  HPI:  59 y.o. female presents to the office for follow up of bilateral knee osteoarthritis. At last visit she received bilateral knee cortisone injections that have been beneficial for her. She has had gradual return of bilateral medial knee pain. Her pain worsens with activities. She is interested in discussing repeat cortisone injections today in the office.    Allergy:  Allergies   Allergen Reactions    Molds & Smuts Other (See Comments)     congestion     Medications:  all current active meds have been reviewed  Past Medical History:  Past Medical History:   Diagnosis Date    Arthritis     Basal cell carcinoma     Per patient , had a BCC on her left upper arm 6 years ago that was removed by the MOHS procedure     Disease of thyroid gland     Herpes zoster     Right, at the T6 dermatome. last assessed: 05/06/13    Hypercholesteremia     Iron  deficiency anemia 2017    Obesity      Past Surgical History:  Past Surgical History:   Procedure Laterality Date    BREAST BIOPSY Left 2019    US guided    BREAST BIOPSY Left 2023     SECTION      CHOLECYSTECTOMY      MOHS SURGERY      Per patient, had the MOHS procedure for a BCC left upper arm 6 years ago    US GUIDED BREAST BIOPSY LEFT COMPLETE Left 2019    US GUIDED BREAST BIOPSY LEFT COMPLETE Left 2023     Family History:  Family History   Problem Relation Age of Onset    Dementia Mother     Diabetes Father     Diabetes Sister     Thyroid disease Sister         Disorder    No Known Problems Sister     No Known Problems Sister     No Known Problems Daughter     No Known Problems Maternal Grandmother     No Known Problems Maternal Grandfather     No Known Problems Paternal Grandmother     No Known Problems Paternal Grandfather     Autoimmune disease Brother         Disorder    Diabetes Brother     Thyroid disease Brother     No Known Problems Brother     Breast cancer Maternal Aunt         unknown age of onset    No Known Problems Maternal Aunt     No Known Problems Maternal Aunt     No Known Problems Maternal Aunt     No Known Problems Paternal Aunt      Social History:  Social History     Substance and Sexual Activity   Alcohol Use No     Social History     Substance and Sexual Activity   Drug Use No     Social History     Tobacco Use   Smoking Status Never    Passive exposure: Past   Smokeless Tobacco Never           ROS:  General: Per HPI  Skin: Negative, except if noted below  HEENT: Negative  Respiratory: Negative  Cardiovascular: Negative  Gastrointestinal: Negative  Urinary: Negative  Vascular: Negative  Musculoskeletal: Positive per HPI   Neurologic: Positive per HPI  Endocrine: Negative    Objective:  BP Readings from Last 1 Encounters:   25 149/79      Wt Readings from Last 1 Encounters:   25 122 kg (270 lb)        Respiratory:   non-labored  "respirations    Lymphatics:  no palpable lymph nodes    Gait:   Antalgic     Neurologic:   Alert and oriented times 3  Patient with normal sensation except as noted below  Deep tendon reflexes 2+ except as noted in MSK exam     Bilateral Lower Extremity:  Left Knee:      Inspection:  skin intact    Overall limb alignment varus    Effusion: none    ROM full with pain    Extensor Lag: none    Palpation: medial Joint line tenderness to palpation    AP Stability at 90 deg stable    M/L stability in full extension stable    M/L stability in midflexion stable    Motor: 5/5 Q/HS/TA/GS/P    Pulses: 2+ DP / 2+ PT    SILT DP/SP/S/S/TN     Right knee:     Inspection:  skin intact    Overall limb alignment varus    Effusion: none    ROM full with pain    Extensor Lag: none    Palpation: medial Joint line tenderness to palpation    AP Stability at 90 deg stable    M/L stability in full extension stable    M/L stability in midflexion stable    Motor: 5/5 Q/HS/TA/GS/P    Pulses: 2+ DP / 2+ PT    SILT DP/SP/S/S/TN     Imaging:  No new imaging today.     BMI:   Estimated body mass index is 46.35 kg/m² as calculated from the following:    Height as of this encounter: 5' 4\" (1.626 m).    Weight as of this encounter: 122 kg (270 lb).  BSA:   Estimated body surface area is 2.22 meters squared as calculated from the following:    Height as of this encounter: 5' 4\" (1.626 m).    Weight as of this encounter: 122 kg (270 lb).         Large joint arthrocentesis: bilateral knee  Universal Protocol:  Consent: Verbal consent obtained.  Risks and benefits: risks, benefits and alternatives were discussed  Consent given by: patient  Time out: Immediately prior to procedure a \"time out\" was called to verify the correct patient, procedure, equipment, support staff and site/side marked as required.  Timeout called at: 1/24/2025 2:43 PM.  Patient understanding: patient states understanding of the procedure being performed  Site marked: the operative " site was marked  Required items: required blood products, implants, devices, and special equipment available  Patient identity confirmed: verbally with patient  Supporting Documentation  Indications: pain   Procedure Details  Location: knee - bilateral knee  Preparation: Patient was prepped and draped in the usual sterile fashion  Needle size: 22 G  Ultrasound guidance: no  Approach: anterolateral    Medications (Right): 4 mL lidocaine 1 %; 40 mg triamcinolone acetonide 40 mg/mLMedications (Left): 4 mL lidocaine 1 %; 40 mg triamcinolone acetonide 40 mg/mL   Patient tolerance: patient tolerated the procedure well with no immediate complications  Dressing:  Sterile dressing applied        Scribe Attestation      I,:  Kelley Herman am acting as a scribe while in the presence of the attending physician.:       I,:  Errol Bundy DO personally performed the services described in this documentation    as scribed in my presence.:

## 2025-01-25 RX ORDER — LIDOCAINE HYDROCHLORIDE 10 MG/ML
4 INJECTION, SOLUTION INFILTRATION; PERINEURAL
Status: COMPLETED | OUTPATIENT
Start: 2025-01-24 | End: 2025-01-24

## 2025-01-25 RX ORDER — TRIAMCINOLONE ACETONIDE 40 MG/ML
40 INJECTION, SUSPENSION INTRA-ARTICULAR; INTRAMUSCULAR
Status: COMPLETED | OUTPATIENT
Start: 2025-01-24 | End: 2025-01-24

## 2025-03-04 DIAGNOSIS — E03.9 ACQUIRED HYPOTHYROIDISM: ICD-10-CM

## 2025-03-06 RX ORDER — LEVOTHYROXINE SODIUM 75 UG/1
75 TABLET ORAL DAILY
Qty: 90 TABLET | Refills: 1 | OUTPATIENT
Start: 2025-03-06

## 2025-03-14 ENCOUNTER — OFFICE VISIT (OUTPATIENT)
Dept: FAMILY MEDICINE CLINIC | Facility: CLINIC | Age: 60
End: 2025-03-14
Payer: COMMERCIAL

## 2025-03-14 VITALS
WEIGHT: 269.2 LBS | OXYGEN SATURATION: 97 % | BODY MASS INDEX: 45.96 KG/M2 | DIASTOLIC BLOOD PRESSURE: 100 MMHG | SYSTOLIC BLOOD PRESSURE: 160 MMHG | HEART RATE: 83 BPM | HEIGHT: 64 IN | RESPIRATION RATE: 17 BRPM | TEMPERATURE: 98.1 F

## 2025-03-14 DIAGNOSIS — E03.8 OTHER SPECIFIED HYPOTHYROIDISM: ICD-10-CM

## 2025-03-14 DIAGNOSIS — Z12.4 CERVICAL CANCER SCREENING: ICD-10-CM

## 2025-03-14 DIAGNOSIS — J01.90 ACUTE RHINOSINUSITIS: ICD-10-CM

## 2025-03-14 DIAGNOSIS — Z13.6 SCREENING FOR CARDIOVASCULAR CONDITION: ICD-10-CM

## 2025-03-14 DIAGNOSIS — R03.0 ELEVATED BLOOD PRESSURE READING IN OFFICE WITHOUT DIAGNOSIS OF HYPERTENSION: ICD-10-CM

## 2025-03-14 DIAGNOSIS — Z13.1 SCREENING FOR DIABETES MELLITUS: ICD-10-CM

## 2025-03-14 DIAGNOSIS — Z12.11 SCREENING FOR MALIGNANT NEOPLASM OF COLON: ICD-10-CM

## 2025-03-14 DIAGNOSIS — Z12.31 ENCOUNTER FOR SCREENING MAMMOGRAM FOR BREAST CANCER: ICD-10-CM

## 2025-03-14 DIAGNOSIS — Z00.00 ANNUAL PHYSICAL EXAM: Primary | ICD-10-CM

## 2025-03-14 PROCEDURE — 99396 PREV VISIT EST AGE 40-64: CPT | Performed by: STUDENT IN AN ORGANIZED HEALTH CARE EDUCATION/TRAINING PROGRAM

## 2025-03-14 RX ORDER — LEVOTHYROXINE SODIUM 75 UG/1
75 TABLET ORAL DAILY
Qty: 30 TABLET | Refills: 0 | Status: SHIPPED | OUTPATIENT
Start: 2025-03-14

## 2025-03-14 NOTE — PATIENT INSTRUCTIONS
"Please adhere to DASH DIET  Recommend reviewing the website validatebp.org for a blood pressure cuff  Goal blood pressure <140/90  Return to care in 4 weeks with blood pressure log    Patient Education     Routine physical for adults   The Basics   Written by the doctors and editors at [facility]   What is a physical? -- A physical is a routine visit, or \"check-up,\" with your doctor. You might also hear it called a \"wellness visit\" or \"preventive visit.\"  During each visit, the doctor will:   Ask about your physical and mental health   Ask about your habits, behaviors, and lifestyle   Do an exam   Give you vaccines if needed   Talk to you about any medicines you take   Give advice about your health   Answer your questions  Getting regular check-ups is an important part of taking care of your health. It can help your doctor find and treat any problems you have. But it's also important for preventing health problems.  A routine physical is different from a \"sick visit.\" A sick visit is when you see a doctor because of a health concern or problem. Since physicals are scheduled ahead of time, you can think about what you want to ask the doctor.  How often should I get a physical? -- It depends on your age and health. In general, for people age 21 years and older:   If you are younger than 50 years, you might be able to get a physical every 3 years.   If you are 50 years or older, your doctor might recommend a physical every year.  If you have an ongoing health condition, like diabetes or high blood pressure, your doctor will probably want to see you more often.  What happens during a physical? -- In general, each visit will include:   Physical exam - The doctor or nurse will check your height, weight, heart rate, and blood pressure. They will also look at your eyes and ears. They will ask about how you are feeling and whether you have any symptoms that bother you.   Medicines - It's a good idea to bring a list of all the " "medicines you take to each doctor visit. Your doctor will talk to you about your medicines and answer any questions. Tell them if you are having any side effects that bother you. You should also tell them if you are having trouble paying for any of your medicines.   Habits and behaviors - This includes:   Your diet   Your exercise habits   Whether you smoke, drink alcohol, or use drugs   Whether you are sexually active   Whether you feel safe at home  Your doctor will talk to you about things you can do to improve your health and lower your risk of health problems. They will also offer help and support. For example, if you want to quit smoking, they can give you advice and might prescribe medicines. If you want to improve your diet or get more physical activity, they can help you with this, too.   Lab tests, if needed - The tests you get will depend on your age and situation. For example, your doctor might want to check your:   Cholesterol   Blood sugar   Iron level   Vaccines - The recommended vaccines will depend on your age, health, and what vaccines you already had. Vaccines are very important because they can prevent certain serious or deadly infections.   Discussion of screening - \"Screening\" means checking for diseases or other health problems before they cause symptoms. Your doctor can recommend screening based on your age, risk, and preferences. This might include tests to check for:   Cancer, such as breast, prostate, cervical, ovarian, colorectal, prostate, lung, or skin cancer   Sexually transmitted infections, such as chlamydia and gonorrhea   Mental health conditions like depression and anxiety  Your doctor will talk to you about the different types of screening tests. They can help you decide which screenings to have. They can also explain what the results might mean.   Answering questions - The physical is a good time to ask the doctor or nurse questions about your health. If needed, they can refer " you to other doctors or specialists, too.  Adults older than 65 years often need other care, too. As you get older, your doctor will talk to you about:   How to prevent falling at home   Hearing or vision tests   Memory testing   How to take your medicines safely   Making sure that you have the help and support you need at home  All topics are updated as new evidence becomes available and our peer review process is complete.  This topic retrieved from [employer] on: May 02, 2024.  Topic 566411 Version 1.0  Release: 32.4.3 - C32.122  © 2024 UpToDate, Inc. and/or its affiliates. All rights reserved.  Consumer Information Use and Disclaimer   Disclaimer: This generalized information is a limited summary of diagnosis, treatment, and/or medication information. It is not meant to be comprehensive and should be used as a tool to help the user understand and/or assess potential diagnostic and treatment options. It does NOT include all information about conditions, treatments, medications, side effects, or risks that may apply to a specific patient. It is not intended to be medical advice or a substitute for the medical advice, diagnosis, or treatment of a health care provider based on the health care provider's examination and assessment of a patient's specific and unique circumstances. Patients must speak with a health care provider for complete information about their health, medical questions, and treatment options, including any risks or benefits regarding use of medications. This information does not endorse any treatments or medications as safe, effective, or approved for treating a specific patient. UpToDate, Inc. and its affiliates disclaim any warranty or liability relating to this information or the use thereof.The use of this information is governed by the Terms of Use, available at https://www.woltersLumafituwer.com/en/know/clinical-effectiveness-terms. 2024© UpToDate, Inc. and its affiliates and/or licensors. All  rights reserved.  Copyright   © 2024 [employer] and/or its affiliates. All rights reserved.     Please continue to take Zofran every 8 hours as needed for pain.   Can take 500mg of tylenol every 6 hours as needed for pain.     Please follow up with GI Team as scheduled.     Return to the ED for worsening or persistent symptoms or any other concerns.

## 2025-03-14 NOTE — PROGRESS NOTES
Adult Annual Physical  Name: Thuy Allan      : 1965      MRN: [id number]  Encounter Provider: Danae Yañez MD  Encounter Date: 3/14/2025   Encounter department: [facility]    Assessment & Plan  Annual physical exam  PAP due  CRC screening DUE (- diarrhea- not valid)  Mammogram DUE   LDCT NA       Encounter for screening mammogram for breast cancer  Due for breast cancer screening   Orders:    Mammo screening bilateral w 3d and cad; Future    Screening for malignant neoplasm of colon  Due for cologuard   Orders:    Cologuard    Other specified hypothyroidism  TSH due; 30 day supply ordered   Orders:    TSH, 3rd generation with Free T4 reflex; Future    levothyroxine 75 mcg tablet; Take 1 tablet (75 mcg total) by mouth daily    Screening for diabetes mellitus  Pt aware of labs ordered during appointment. Follow up interval discussed and pt agreed. If acute abnormalities, will contact patient prior to follow up appointment.     Orders:    Hemoglobin A1C; Future    Comprehensive metabolic panel; Future    Screening for cardiovascular condition    Orders:    Lipid Panel with Direct LDL reflex; Future    CBC and Platelet; Future    Cervical cancer screening  No PAP in 30 years; due   Orders:    Ambulatory Referral to Obstetrics / Gynecology; Future    Acute rhinosinusitis  4 days of rhinorrhea; no localizing sinus tenderness or lower pulmonary sx; likely viral    Symptoms consistent with viral URI; no evidence of a superimposed bacterial infection. Cardiopulmonary exam normal and VS stable/wnl    Discussed conservative treatment to include rest, hydration, saline nasal rinses, nasal steroids/afrin, OTC cold tx     Recommend: Saline nasal spray or netti pot (NO TAP water, sterilized water only) followed by over the counter flonase/fluticasone or afrin (do not exceed 72 hours of afrin use)     Pt to RTC if sx acutely worsen. ED return precautions discussed          Elevated  blood pressure reading in office without diagnosis of hypertension  Repeat /80    Please adhere to DASH DIET  Recommend reviewing the website validatebp.org for a blood pressure cuff  Goal blood pressure <140/90  Return to care in 4 weeks with blood pressure log                Preventive Screenings:  - Diabetes Screening: screening up-to-date  - Cholesterol Screening: orders placed   - Hepatitis C screening: screening up-to-date   - HIV screening: screening up-to-date   - Breast cancer screening: screening up-to-date and orders placed   - Colon cancer screening: orders placed   - Lung cancer screening: screening not indicated     Immunizations:  - Immunizations due: Influenza and Zoster (Shingrix)         History of Present Illness     Adult Annual Physical:  Patient presents for annual physical. 58 yo F w/  preDM, HLD and hypothyroidism presenting for annual exam. Notes URI sx for 4 days; no fever in 48 hours- denies SOB, notes just nasal congestion.     Diet and Physical Activity:  - Diet/Nutrition: no special diet.  - Exercise: no formal exercise.    Depression Screening:  - PHQ-2 Score: 0    General Health:  - Sleep: sleeps well.  - Hearing: normal hearing bilateral ears.  - Vision: vision problems. Has pending optometry apt    /GYN Health:  - Follows with GYN: no.   - History of STDs: no    Advanced Care Planning:  - Has an advanced directive?: no    - Has a durable medical POA?: no    - ACP document given to patient?: yes      Review of Systems   Constitutional:  Negative for chills and fever.   HENT:  Negative for ear pain, hearing loss and trouble swallowing.    Eyes:  Negative for pain.   Respiratory:  Negative for cough and shortness of breath.    Cardiovascular:  Negative for chest pain and palpitations.   Gastrointestinal:  Negative for abdominal pain and blood in stool.   Genitourinary:  Negative for dysuria and hematuria.   Musculoskeletal:  Positive for arthralgias. Negative for back pain.  "  Skin:  Negative for color change and rash.   Neurological:  Negative for syncope and light-headedness.   All other systems reviewed and are negative.    Medical History Reviewed by provider this encounter:  Tobacco  Allergies  Meds  Problems  Med Hx  Surg Hx  Fam Hx     .  Current Outpatient Medications on File Prior to Visit   Medication Sig Dispense Refill    Coenzyme Q10 (CO Q 10 PO) Take by mouth daily      Multiple Vitamin (MULTIVITAMIN ADULT PO) Take by mouth daily      Omega-3 Fatty Acids (FISH OIL PO) Take by mouth daily      [DISCONTINUED] levothyroxine 75 mcg tablet TAKE 1 TABLET BY MOUTH EVERY DAY 90 tablet 1    atorvastatin (LIPITOR) 20 mg tablet Take 1 tablet (20 mg total) by mouth daily 90 tablet 1    benzonatate (TESSALON) 200 MG capsule Take 1 capsule (200 mg total) by mouth 3 (three) times a day as needed for cough 20 capsule 0    loratadine (CLARITIN) 10 mg tablet Take 10 mg by mouth daily       No current facility-administered medications on file prior to visit.      Social History     Tobacco Use    Smoking status: Never     Passive exposure: Past    Smokeless tobacco: Never   Vaping Use    Vaping status: Never Used   Substance and Sexual Activity    Alcohol use: No    Drug use: No    Sexual activity: Not Currently     Birth control/protection: Post-menopausal       Objective   /100 (BP Location: Left arm, Patient Position: Sitting, Cuff Size: Large)   Pulse 83   Temp 98.1 °F (36.7 °C) (Tympanic)   Resp 17   Ht 5' 4\" (1.626 m)   Wt 122 kg (269 lb 3.2 oz)   LMP  (LMP Unknown)   SpO2 97%   BMI 46.21 kg/m²     Physical Exam  Vitals and nursing note reviewed.   Constitutional:       General: She is not in acute distress.     Appearance: Normal appearance. She is well-developed.   HENT:      Head: Normocephalic and atraumatic.      Comments: No sinus tenderness on palpation      Right Ear: Tympanic membrane, ear canal and external ear normal.      Left Ear: Tympanic membrane, " ear canal and external ear normal.      Nose: Nose normal.      Mouth/Throat:      Mouth: Mucous membranes are moist.      Pharynx: Oropharynx is clear.   Eyes:      Extraocular Movements: Extraocular movements intact.      Conjunctiva/sclera: Conjunctivae normal.      Pupils: Pupils are equal, round, and reactive to light.   Cardiovascular:      Rate and Rhythm: Normal rate and regular rhythm.      Pulses: Normal pulses.      Heart sounds: Normal heart sounds. No murmur heard.  Pulmonary:      Effort: Pulmonary effort is normal. No respiratory distress.      Breath sounds: Normal breath sounds. No wheezing or rales.   Abdominal:      General: Bowel sounds are normal.      Palpations: Abdomen is soft.      Tenderness: There is no abdominal tenderness.   Musculoskeletal:         General: No swelling. Normal range of motion.      Cervical back: Normal range of motion and neck supple. No tenderness.      Right lower leg: No edema.      Left lower leg: No edema.   Lymphadenopathy:      Cervical: No cervical adenopathy.   Skin:     General: Skin is warm and dry.      Capillary Refill: Capillary refill takes less than 2 seconds.   Neurological:      General: No focal deficit present.      Mental Status: She is alert.   Psychiatric:         Mood and Affect: Mood normal.       Administrative Statements   I have spent a total time of 30 minutes in caring for this patient on the day of the visit/encounter including Prognosis, Risks and benefits of tx options, Instructions for management, Patient and family education, Importance of tx compliance, Risk factor reductions, Impressions, Counseling / Coordination of care, Documenting in the medical record, Reviewing/placing orders in the medical record (including tests, medications, and/or procedures), and Obtaining or reviewing history  .

## 2025-03-14 NOTE — ASSESSMENT & PLAN NOTE
TSH due; 30 day supply ordered   Orders:    TSH, 3rd generation with Free T4 reflex; Future    levothyroxine 75 mcg tablet; Take 1 tablet (75 mcg total) by mouth daily

## 2025-03-22 LAB
ALBUMIN SERPL-MCNC: 4.1 G/DL (ref 3.8–4.9)
ALP SERPL-CCNC: 75 IU/L (ref 44–121)
ALT SERPL-CCNC: 29 IU/L (ref 0–32)
AST SERPL-CCNC: 21 IU/L (ref 0–40)
BILIRUB SERPL-MCNC: 0.3 MG/DL (ref 0–1.2)
BUN SERPL-MCNC: 11 MG/DL (ref 6–24)
BUN/CREAT SERPL: 15 (ref 9–23)
CALCIUM SERPL-MCNC: 10.4 MG/DL (ref 8.7–10.2)
CHLORIDE SERPL-SCNC: 103 MMOL/L (ref 96–106)
CHOLEST SERPL-MCNC: 295 MG/DL (ref 100–199)
CO2 SERPL-SCNC: 23 MMOL/L (ref 20–29)
CREAT SERPL-MCNC: 0.75 MG/DL (ref 0.57–1)
EGFR: 92 ML/MIN/1.73
ERYTHROCYTE [DISTWIDTH] IN BLOOD BY AUTOMATED COUNT: 12.8 % (ref 11.7–15.4)
EST. AVERAGE GLUCOSE BLD GHB EST-MCNC: 126 MG/DL
GLOBULIN SER-MCNC: 2.9 G/DL (ref 1.5–4.5)
GLUCOSE SERPL-MCNC: 105 MG/DL (ref 70–99)
HBA1C MFR BLD: 6 % (ref 4.8–5.6)
HCT VFR BLD AUTO: 43.2 % (ref 34–46.6)
HDLC SERPL-MCNC: 58 MG/DL
HGB BLD-MCNC: 14.5 G/DL (ref 11.1–15.9)
LDL CALC COMMENT: ABNORMAL
LDLC SERPL CALC-MCNC: 187 MG/DL (ref 0–99)
LDLC/HDLC SERPL: 3.2 RATIO (ref 0–3.2)
MCH RBC QN AUTO: 30.9 PG (ref 26.6–33)
MCHC RBC AUTO-ENTMCNC: 33.6 G/DL (ref 31.5–35.7)
MCV RBC AUTO: 92 FL (ref 79–97)
PLATELET # BLD AUTO: 341 X10E3/UL (ref 150–450)
POTASSIUM SERPL-SCNC: 4.3 MMOL/L (ref 3.5–5.2)
PROT SERPL-MCNC: 7 G/DL (ref 6–8.5)
RBC # BLD AUTO: 4.7 X10E6/UL (ref 3.77–5.28)
SL AMB VLDL CHOLESTEROL CALC: 50 MG/DL (ref 5–40)
SODIUM SERPL-SCNC: 141 MMOL/L (ref 134–144)
TRIGL SERPL-MCNC: 258 MG/DL (ref 0–149)
TSH SERPL DL<=0.005 MIU/L-ACNC: 2.46 UIU/ML (ref 0.45–4.5)
WBC # BLD AUTO: 8 X10E3/UL (ref 3.4–10.8)

## 2025-03-25 ENCOUNTER — RESULTS FOLLOW-UP (OUTPATIENT)
Dept: FAMILY MEDICINE CLINIC | Facility: CLINIC | Age: 60
End: 2025-03-25

## 2025-03-25 DIAGNOSIS — E03.9 ACQUIRED HYPOTHYROIDISM: Primary | ICD-10-CM

## 2025-03-25 DIAGNOSIS — E83.52 HYPERCALCEMIA: ICD-10-CM

## 2025-03-25 RX ORDER — LEVOTHYROXINE SODIUM 75 UG/1
75 TABLET ORAL DAILY
Qty: 90 TABLET | Refills: 3 | Status: SHIPPED | OUTPATIENT
Start: 2025-03-25

## 2025-03-26 NOTE — TELEPHONE ENCOUNTER
Thuy called to let Dr. Yañez know that she stop taking the atorvastatin because it use to give her Charley horse cramps all over the body.    Thuy also would like to get more detail of the 24 hour urine calcium test. She does not know what she needs to do before completing the test or what it consist of.  Thuy said she is not taking calcium supplements but she is taking Multivitamin alive woman 55 and is not sure if it has calcium.

## 2025-04-09 LAB
CALCIUM 24H UR-MCNC: 18.3 MG/DL
CALCIUM 24H UR-MRATE: 256 MG/24 HR (ref 0–320)
PTH-INTACT SERPL-MCNC: 61 PG/ML (ref 15–65)

## 2025-04-15 ENCOUNTER — OFFICE VISIT (OUTPATIENT)
Dept: FAMILY MEDICINE CLINIC | Facility: CLINIC | Age: 60
End: 2025-04-15
Payer: COMMERCIAL

## 2025-04-15 VITALS
TEMPERATURE: 98.7 F | SYSTOLIC BLOOD PRESSURE: 152 MMHG | HEART RATE: 67 BPM | RESPIRATION RATE: 16 BRPM | WEIGHT: 266.2 LBS | OXYGEN SATURATION: 98 % | BODY MASS INDEX: 45.45 KG/M2 | HEIGHT: 64 IN | DIASTOLIC BLOOD PRESSURE: 88 MMHG

## 2025-04-15 DIAGNOSIS — Z12.11 SCREENING FOR COLON CANCER: ICD-10-CM

## 2025-04-15 DIAGNOSIS — E78.2 MIXED HYPERLIPIDEMIA: ICD-10-CM

## 2025-04-15 DIAGNOSIS — E83.52 HYPERCALCEMIA: Primary | ICD-10-CM

## 2025-04-15 DIAGNOSIS — G62.9 NEUROPATHY: ICD-10-CM

## 2025-04-15 DIAGNOSIS — Z12.31 ENCOUNTER FOR SCREENING MAMMOGRAM FOR BREAST CANCER: ICD-10-CM

## 2025-04-15 DIAGNOSIS — Z12.4 CERVICAL CANCER SCREENING: ICD-10-CM

## 2025-04-15 DIAGNOSIS — I10 PRIMARY HYPERTENSION: ICD-10-CM

## 2025-04-15 PROCEDURE — 99214 OFFICE O/P EST MOD 30 MIN: CPT | Performed by: STUDENT IN AN ORGANIZED HEALTH CARE EDUCATION/TRAINING PROGRAM

## 2025-04-15 RX ORDER — LOSARTAN POTASSIUM 25 MG/1
25 TABLET ORAL DAILY
Qty: 90 TABLET | Refills: 3 | Status: SHIPPED | OUTPATIENT
Start: 2025-04-15

## 2025-04-15 RX ORDER — ROSUVASTATIN CALCIUM 10 MG/1
10 TABLET, COATED ORAL DAILY
Qty: 90 TABLET | Refills: 3 | Status: SHIPPED | OUTPATIENT
Start: 2025-04-15

## 2025-04-15 NOTE — PROGRESS NOTES
Name: Thuy Allan      : 1965      MRN: [id number]  Encounter Provider: Danae Yañez MD  Encounter Date: 4/15/2025   Encounter department: [facility]  :  Assessment & Plan  Hypercalcemia  Normal PTH in past (47 in ),  24 hour urine calcium test completed  and appropriate level     Discussed nephrology referral - pt declined    Additional lab ordered   Orders:    Magnesium; Future    Vitamin D 1,25 Dihydroxy; Future    Vitamin D 25 hydroxy; Future    PTH-related peptide; Future    Basic metabolic panel; Future    Calcium, ionized; Future    Encounter for screening mammogram for breast cancer  Mammogram ordered on 2025- reminded pt to schedule       Screening for colon cancer  Cologuard ordered  pt reminded        Cervical cancer screening  No PAP in 30 years; due; ordered referral on  reminded          Mixed hyperlipidemia  Recommend statin tx; lipitor AE in past; will start crestor 10 mg daily. AE of tx discussed   Orders:    rosuvastatin (CRESTOR) 10 MG tablet; Take 1 tablet (10 mg total) by mouth daily    Neuropathy  New onset Le neuropathy, monofilament testing normal     May be due to preDM pending additional labs   Orders:    Vitamin B12/Folate, Serum Panel; Future    Protein electrophoresis, serum; Future    Protein electrophoresis, urine; Future    Sedimentation rate, automated; Future    C-reactive protein; Future    AMADOU Screen w/Reflex Roberts; Future    Primary hypertension  BP above goal at clinic and at home, recommend tx and pt agreed. Losartan AE discussed- ordered. Labs one week after rx started   Orders:    losartan (COZAAR) 25 mg tablet; Take 1 tablet (25 mg total) by mouth daily           History of Present Illness   59 yo F w/  preDM, HLD and hypothyroidism presenting for lab follow up. Hypercalcemia noted and discussed today. Notes intermittent neuropathy of LE; just started the other day. Denies any new infectious sx or  "GI sx       Review of Systems   Constitutional:  Negative for chills and fever.   HENT:  Negative for trouble swallowing.    Eyes:  Negative for visual disturbance.   Respiratory:  Negative for cough and shortness of breath.    Cardiovascular:  Negative for chest pain and palpitations.   Gastrointestinal:  Negative for abdominal pain and blood in stool.   Genitourinary:  Negative for dysuria and hematuria.   Musculoskeletal:  Negative for arthralgias and back pain.   Skin:  Negative for color change and rash.   Neurological:  Positive for numbness. Negative for syncope and weakness.   All other systems reviewed and are negative.      Objective   /88 (BP Location: Left arm, Patient Position: Sitting, Cuff Size: Large)   Pulse 67   Temp 98.7 °F (37.1 °C) (Tympanic)   Resp 16   Ht 5' 4\" (1.626 m)   Wt 121 kg (266 lb 3.2 oz)   LMP  (LMP Unknown)   SpO2 98%   BMI 45.69 kg/m²      Physical Exam  Vitals and nursing note reviewed.   Constitutional:       General: She is not in acute distress.     Appearance: Normal appearance. She is well-developed.   HENT:      Head: Normocephalic and atraumatic.   Eyes:      Conjunctiva/sclera: Conjunctivae normal.   Cardiovascular:      Rate and Rhythm: Normal rate and regular rhythm.      Pulses: Normal pulses.      Heart sounds: Normal heart sounds. No murmur heard.  Pulmonary:      Effort: Pulmonary effort is normal. No respiratory distress.      Breath sounds: Normal breath sounds. No wheezing or rales.   Abdominal:      General: Bowel sounds are normal.      Palpations: Abdomen is soft.      Tenderness: There is no abdominal tenderness.   Musculoskeletal:         General: No swelling. Normal range of motion.      Cervical back: Normal range of motion and neck supple.      Right lower leg: No edema.      Left lower leg: No edema.   Skin:     General: Skin is warm and dry.      Capillary Refill: Capillary refill takes less than 2 seconds.   Neurological:      General: No " focal deficit present.      Mental Status: She is alert.      Sensory: Sensation is intact.      Motor: Motor function is intact.      Coordination: Coordination is intact.      Comments: Monofilament testing of b/l feet normal    Psychiatric:         Mood and Affect: Mood normal.       Administrative Statements   I have spent a total time of 30 minutes in caring for this patient on the day of the visit/encounter including Prognosis, Risks and benefits of tx options, Instructions for management, Patient and family education, Importance of tx compliance, Risk factor reductions, Impressions, Counseling / Coordination of care, Documenting in the medical record, and Reviewing/placing orders in the medical record (including tests, medications, and/or procedures).

## 2025-04-15 NOTE — ASSESSMENT & PLAN NOTE
Recommend statin tx; lipitor AE in past; will start crestor 10 mg daily. AE of tx discussed   Orders:    rosuvastatin (CRESTOR) 10 MG tablet; Take 1 tablet (10 mg total) by mouth daily

## 2025-04-15 NOTE — ASSESSMENT & PLAN NOTE
Normal PTH in past (47 in 2023),  24 hour urine calcium test completed  and appropriate level     Discussed nephrology referral - pt declined    Additional lab ordered   Orders:    Magnesium; Future    Vitamin D 1,25 Dihydroxy; Future    Vitamin D 25 hydroxy; Future    PTH-related peptide; Future    Basic metabolic panel; Future    Calcium, ionized; Future

## 2025-04-25 ENCOUNTER — OFFICE VISIT (OUTPATIENT)
Dept: OBGYN CLINIC | Facility: CLINIC | Age: 60
End: 2025-04-25
Payer: COMMERCIAL

## 2025-04-25 VITALS — HEIGHT: 64 IN | BODY MASS INDEX: 45.41 KG/M2 | WEIGHT: 266 LBS

## 2025-04-25 DIAGNOSIS — M17.0 PRIMARY OSTEOARTHRITIS OF BOTH KNEES: Primary | ICD-10-CM

## 2025-04-25 PROCEDURE — 20610 DRAIN/INJ JOINT/BURSA W/O US: CPT | Performed by: STUDENT IN AN ORGANIZED HEALTH CARE EDUCATION/TRAINING PROGRAM

## 2025-04-25 PROCEDURE — 99213 OFFICE O/P EST LOW 20 MIN: CPT | Performed by: STUDENT IN AN ORGANIZED HEALTH CARE EDUCATION/TRAINING PROGRAM

## 2025-04-25 RX ORDER — LIDOCAINE HYDROCHLORIDE 10 MG/ML
4 INJECTION, SOLUTION INFILTRATION; PERINEURAL
Status: COMPLETED | OUTPATIENT
Start: 2025-04-25 | End: 2025-04-25

## 2025-04-25 RX ORDER — TRIAMCINOLONE ACETONIDE 40 MG/ML
40 INJECTION, SUSPENSION INTRA-ARTICULAR; INTRAMUSCULAR
Status: COMPLETED | OUTPATIENT
Start: 2025-04-25 | End: 2025-04-25

## 2025-04-25 RX ADMIN — TRIAMCINOLONE ACETONIDE 40 MG: 40 INJECTION, SUSPENSION INTRA-ARTICULAR; INTRAMUSCULAR at 09:15

## 2025-04-25 RX ADMIN — LIDOCAINE HYDROCHLORIDE 4 ML: 10 INJECTION, SOLUTION INFILTRATION; PERINEURAL at 09:15

## 2025-04-25 NOTE — ASSESSMENT & PLAN NOTE
Findings today are consistent with bilateral knee osteoarthritis. Imaging and prognosis was reviewed with the patient today. Discussed treatment options including continued observation, low impact exercises, bracing, anti-inflammatories, physical therapy, cortisone injection, visco injection, versus surgical intervention. Cortisone steroid injection was administered today. Patient should avoid strenuous activities for the next 1-2 days. Patient should avoid vaccines for the next 2 weeks if possible. Can apply cold compress for soreness. If patient feels relief with the cortisone injections, procedure can be repeated every 3 months. Follow up in 3 months.

## 2025-04-25 NOTE — PROGRESS NOTES
Knee New Office Note    Assessment:     1. Primary osteoarthritis of both knees        Plan:  Assessment & Plan  Primary osteoarthritis of both knees  Findings today are consistent with bilateral knee osteoarthritis. Imaging and prognosis was reviewed with the patient today. Discussed treatment options including continued observation, low impact exercises, bracing, anti-inflammatories, physical therapy, cortisone injection, visco injection, versus surgical intervention. Cortisone steroid injection was administered today. Patient should avoid strenuous activities for the next 1-2 days. Patient should avoid vaccines for the next 2 weeks if possible. Can apply cold compress for soreness. If patient feels relief with the cortisone injections, procedure can be repeated every 3 months. Follow up in 3 months.         Subjective:     Patient ID: Thuy Allan is a 60 y.o. female.  Chief Complaint:  HPI:  60 y.o. female presents to the office for follow up of bilateral knee osteoarthritis. At last visit she received bilateral knee cortisone injections that have been beneficial for her. She notes that she had a little bit less relief with the most recent injections compared to previous injections. She has had return of bilateral medial knee pain. Her pain worsens with activities. She is interested in discussing repeat cortisone injections today in the office.     Allergy:  Allergies   Allergen Reactions    Molds & Smuts Other (See Comments)     congestion     Medications:  all current active meds have been reviewed  Past Medical History:  Past Medical History:   Diagnosis Date    Arthritis     Basal cell carcinoma     Per patient , had a BCC on her left upper arm 6 years ago that was removed by the MOHS procedure     Disease of thyroid gland     Herpes zoster     Right, at the T6 dermatome. last assessed: 05/06/13    Hypercholesteremia     Iron deficiency anemia 8/25/2017    Obesity      Past Surgical History:  Past  Surgical History:   Procedure Laterality Date    BREAST BIOPSY Left 2019    US guided    BREAST BIOPSY Left 2023     SECTION      CHOLECYSTECTOMY      MOHS SURGERY      Per patient, had the MOHS procedure for a BCC left upper arm 6 years ago    US GUIDED BREAST BIOPSY LEFT COMPLETE Left 2019    US GUIDED BREAST BIOPSY LEFT COMPLETE Left 2023     Family History:  Family History   Problem Relation Age of Onset    Dementia Mother     Diabetes Father     Diabetes Sister     Thyroid disease Sister         Disorder    No Known Problems Sister     No Known Problems Sister     No Known Problems Daughter     No Known Problems Maternal Grandmother     No Known Problems Maternal Grandfather     No Known Problems Paternal Grandmother     No Known Problems Paternal Grandfather     Autoimmune disease Brother         Disorder    Diabetes Brother     Thyroid disease Brother     No Known Problems Brother     Breast cancer Maternal Aunt         unknown age of onset    No Known Problems Maternal Aunt     No Known Problems Maternal Aunt     No Known Problems Maternal Aunt     No Known Problems Paternal Aunt      Social History:  Social History     Substance and Sexual Activity   Alcohol Use No     Social History     Substance and Sexual Activity   Drug Use No     Social History     Tobacco Use   Smoking Status Never    Passive exposure: Past   Smokeless Tobacco Never         ROS:  General: Per HPI  Skin: Negative, except if noted below  HEENT: Negative  Respiratory: Negative  Cardiovascular: Negative  Gastrointestinal: Negative  Urinary: Negative  Vascular: Negative  Musculoskeletal: Positive per HPI   Neurologic: Positive per HPI  Endocrine: Negative    Objective:  BP Readings from Last 1 Encounters:   04/15/25 152/88      Wt Readings from Last 1 Encounters:   25 121 kg (266 lb)        Respiratory:   non-labored respirations    Lymphatics:  no palpable lymph nodes    Gait:   Antalgic    Neurologic:  "  Alert and oriented times 3  Patient with normal sensation except as noted below  Deep tendon reflexes 2+ except as noted in MSK exam    Bilateral Lower Extremity:  Left Knee:      Inspection:  skin intact    Overall limb alignment varus    Effusion: none    ROM full with pain    Extensor Lag: none    Palpation: medial Joint line tenderness to palpation    AP Stability at 90 deg stable    M/L stability in full extension stable    M/L stability in midflexion stable    Motor: 5/5 Q/HS/TA/GS/P    Pulses: 2+ DP / 2+ PT    SILT DP/SP/S/S/TN     Right knee:     Inspection:  skin intact    Overall limb alignment varus    Effusion: none    ROM full with pain    Extensor Lag: none    Palpation: medial Joint line tenderness to palpation    AP Stability at 90 deg stable    M/L stability in full extension stable    M/L stability in midflexion stable    Motor: 5/5 Q/HS/TA/GS/P    Pulses: 2+ DP / 2+ PT    SILT DP/SP/S/S/TN     Imaging:  No new imaging obtained today    BMI:   Estimated body mass index is 45.66 kg/m² as calculated from the following:    Height as of this encounter: 5' 4\" (1.626 m).    Weight as of this encounter: 121 kg (266 lb).  BSA:   Estimated body surface area is 2.21 meters squared as calculated from the following:    Height as of this encounter: 5' 4\" (1.626 m).    Weight as of this encounter: 121 kg (266 lb).         Large joint arthrocentesis: bilateral knee  Universal Protocol:  Consent: Verbal consent obtained.  Risks and benefits: risks, benefits and alternatives were discussed  Consent given by: patient  Time out: Immediately prior to procedure a \"time out\" was called to verify the correct patient, procedure, equipment, support staff and site/side marked as required.  Timeout called at: 4/25/2025 9:29 AM.  Patient understanding: patient states understanding of the procedure being performed  Site marked: the operative site was marked  Required items: required blood products, implants, devices, and " special equipment available  Patient identity confirmed: verbally with patient  Supporting Documentation  Indications: pain     Is this a Visco injection? NoProcedure Details  Location: knee - bilateral knee  Preparation: Patient was prepped and draped in the usual sterile fashion  Needle size: 22 G  Ultrasound guidance: no  Approach: anterolateral    Medications (Right): 4 mL lidocaine 1 %; 40 mg triamcinolone acetonide 40 mg/mLMedications (Left): 4 mL lidocaine 1 %; 40 mg triamcinolone acetonide 40 mg/mL   Patient tolerance: patient tolerated the procedure well with no immediate complications  Dressing:  Sterile dressing applied          Scribe Attestation      I,:  Kelley Herman am acting as a scribe while in the presence of the attending physician.:       I,:  Errol Bundy DO personally performed the services described in this documentation    as scribed in my presence.:

## 2025-05-02 ENCOUNTER — OFFICE VISIT (OUTPATIENT)
Dept: FAMILY MEDICINE CLINIC | Facility: CLINIC | Age: 60
End: 2025-05-02
Payer: COMMERCIAL

## 2025-05-02 ENCOUNTER — E-CONSULT (OUTPATIENT)
Dept: HEMATOLOGY ONCOLOGY | Facility: CLINIC | Age: 60
End: 2025-05-02
Payer: COMMERCIAL

## 2025-05-02 VITALS
TEMPERATURE: 98.9 F | HEIGHT: 64 IN | HEART RATE: 93 BPM | RESPIRATION RATE: 16 BRPM | BODY MASS INDEX: 45.21 KG/M2 | DIASTOLIC BLOOD PRESSURE: 94 MMHG | SYSTOLIC BLOOD PRESSURE: 150 MMHG | OXYGEN SATURATION: 96 % | WEIGHT: 264.8 LBS

## 2025-05-02 DIAGNOSIS — E83.52 HYPERCALCEMIA: ICD-10-CM

## 2025-05-02 DIAGNOSIS — I10 PRIMARY HYPERTENSION: ICD-10-CM

## 2025-05-02 DIAGNOSIS — R74.9 ABNORMAL SERUM ENZYME LEVEL: Primary | ICD-10-CM

## 2025-05-02 DIAGNOSIS — R74.9 ABNORMAL SERUM ENZYME LEVEL: ICD-10-CM

## 2025-05-02 DIAGNOSIS — E83.52 HYPERCALCEMIA: Primary | ICD-10-CM

## 2025-05-02 DIAGNOSIS — G62.9 NEUROPATHY: ICD-10-CM

## 2025-05-02 LAB
1,25(OH)2D3 SERPL-MCNC: 53.6 PG/ML (ref 24.8–81.5)
25(OH)D3+25(OH)D2 SERPL-MCNC: 33.1 NG/ML (ref 30–100)
ALBUMIN MFR UR ELPH: 57.2 %
ALBUMIN SERPL ELPH-MCNC: 3.3 G/DL (ref 2.9–4.4)
ALBUMIN/GLOB SERPL: 0.9 {RATIO} (ref 0.7–1.7)
ALPHA1 GLOB MFR UR ELPH: 1.4 %
ALPHA1 GLOB SERPL ELPH-MCNC: 0.2 G/DL (ref 0–0.4)
ALPHA2 GLOB MFR UR ELPH: 13.6 %
ALPHA2 GLOB SERPL ELPH-MCNC: 1.2 G/DL (ref 0.4–1)
ANA TITR SER IF: NEGATIVE {TITER}
B-GLOBULIN MFR UR ELPH: 22.1 %
B-GLOBULIN SERPL ELPH-MCNC: 1 G/DL (ref 0.7–1.3)
BUN SERPL-MCNC: 11 MG/DL (ref 6–24)
BUN/CREAT SERPL: 18 (ref 9–23)
CA-I SERPL ISE-MCNC: 5.5 MG/DL (ref 4.5–5.6)
CALCIUM SERPL-MCNC: 10.3 MG/DL (ref 8.7–10.2)
CHLORIDE SERPL-SCNC: 103 MMOL/L (ref 96–106)
CO2 SERPL-SCNC: 19 MMOL/L (ref 20–29)
CREAT SERPL-MCNC: 0.62 MG/DL (ref 0.57–1)
CRP SERPL-MCNC: 5 MG/L (ref 0–10)
EGFR: 103 ML/MIN/1.73
ERYTHROCYTE [SEDIMENTATION RATE] IN BLOOD BY WESTERGREN METHOD: 34 MM/HR (ref 0–40)
FOLATE SERPL-MCNC: 14.4 NG/ML
GAMMA GLOB MFR UR ELPH: 5.7 %
GAMMA GLOB SERPL ELPH-MCNC: 1.1 G/DL (ref 0.4–1.8)
GLOBULIN SER CALC-MCNC: 3.6 G/DL (ref 2.2–3.9)
GLUCOSE SERPL-MCNC: 140 MG/DL (ref 70–99)
LABORATORY COMMENT REPORT: ABNORMAL
LABORATORY COMMENT REPORT: NORMAL
LABORATORY COMMENT REPORT: NORMAL
M PROTEIN MFR UR ELPH: NORMAL %
M PROTEIN SERPL ELPH-MCNC: ABNORMAL G/DL
MAGNESIUM SERPL-MCNC: 1.8 MG/DL (ref 1.6–2.3)
POTASSIUM SERPL-SCNC: 4.2 MMOL/L (ref 3.5–5.2)
PROT SERPL-MCNC: 6.9 G/DL (ref 6–8.5)
PROT UR-MCNC: 14.7 MG/DL
PTH RELATED PROT SERPL-SCNC: <2 PMOL/L
SODIUM SERPL-SCNC: 140 MMOL/L (ref 134–144)
VIT B12 SERPL-MCNC: 522 PG/ML (ref 232–1245)

## 2025-05-02 PROCEDURE — 99446 NTRPROF PH1/NTRNET/EHR 5-10: CPT

## 2025-05-02 PROCEDURE — 99214 OFFICE O/P EST MOD 30 MIN: CPT | Performed by: STUDENT IN AN ORGANIZED HEALTH CARE EDUCATION/TRAINING PROGRAM

## 2025-05-02 RX ORDER — LOSARTAN POTASSIUM 50 MG/1
50 TABLET ORAL DAILY
Qty: 90 TABLET | Refills: 3 | Status: SHIPPED | OUTPATIENT
Start: 2025-05-02

## 2025-05-02 NOTE — PROGRESS NOTES
Name: Thuy Allan      : 1965      MRN: [id number]  Encounter Provider: Danae Yañez MD  Encounter Date: 2025   Encounter department: [facility]  :  Assessment & Plan  Hypercalcemia  Hypercalcemia noted again on BMP; ionized calcium normal. Discussed PTH is on upper limit or normal and  24 hour calcium urine boarderline. PTH rp wnl. Discussed nephrology referral again - pt declined    Pt agreed to complete NM parathyroid scan to definitely evaluate hyperparathyroidism for her chronic hypercalcemia. PTH is inappropriately normal   Orders:    AMB E-CONSULT TO HEMATOLOGY    NM parathyroid scan w spect; Future    Neuropathy  Neuropathy work up obtained; Mild elevation on SPEP- discussed FLC and Heme referral. UPEP wnl. Pt declined FLC; agreed to HEME referral     B12/folate normal; rheumatologic work up wnl  Other labs wnl  Discussed again still possibly related to her preDM; pt noted that sx have improved with lifestyle modifications        Primary hypertension  Recently started on losartan 25 mg daily    BP in clinic above goal'; pt agreed to increase to 50 mg daily   Orders:    losartan (COZAAR) 50 mg tablet; Take 1 tablet (50 mg total) by mouth daily    Abnormal serum enzyme level    Orders:    AMB E-CONSULT TO HEMATOLOGY           History of Present Illness   61 yo F w/  preDM, HLD and hypothyroidism presenting for lab follow up        Review of Systems   Constitutional:  Negative for chills and fever.   HENT:  Negative for ear pain and trouble swallowing.    Eyes:  Negative for visual disturbance.   Respiratory:  Negative for shortness of breath.    Cardiovascular:  Negative for chest pain.   Gastrointestinal:  Negative for abdominal pain.   Genitourinary:  Negative for difficulty urinating.   Musculoskeletal:  Negative for gait problem.   Skin:  Negative for color change and rash.   Neurological:  Negative for light-headedness.   All other systems reviewed and are  "negative.      Objective   /94 (BP Location: Left arm, Patient Position: Sitting, Cuff Size: Large)   Pulse 93   Temp 98.9 °F (37.2 °C) (Tympanic)   Resp 16   Ht 5' 4\" (1.626 m)   Wt 120 kg (264 lb 12.8 oz)   LMP  (LMP Unknown)   SpO2 96%   BMI 45.45 kg/m²      Physical Exam  Vitals and nursing note reviewed.   Constitutional:       General: She is not in acute distress.     Appearance: Normal appearance. She is well-developed.   HENT:      Head: Normocephalic and atraumatic.   Eyes:      Conjunctiva/sclera: Conjunctivae normal.   Cardiovascular:      Rate and Rhythm: Normal rate and regular rhythm.      Pulses: Normal pulses.      Heart sounds: Normal heart sounds. No murmur heard.  Pulmonary:      Effort: Pulmonary effort is normal. No respiratory distress.      Breath sounds: Normal breath sounds. No wheezing or rales.   Abdominal:      General: Bowel sounds are normal. There is no distension.      Palpations: Abdomen is soft.      Tenderness: There is no abdominal tenderness.   Musculoskeletal:         General: No swelling. Normal range of motion.      Cervical back: Normal range of motion and neck supple.      Right lower leg: No edema.      Left lower leg: No edema.   Skin:     General: Skin is warm and dry.      Capillary Refill: Capillary refill takes less than 2 seconds.   Neurological:      General: No focal deficit present.      Mental Status: She is alert.      Motor: No weakness.      Gait: Gait normal.   Psychiatric:         Mood and Affect: Mood normal.       Administrative Statements   I have spent a total time of 30 minutes in caring for this patient on the day of the visit/encounter including Prognosis, Risks and benefits of tx options, Instructions for management, Patient and family education, Importance of tx compliance, Risk factor reductions, Impressions, Counseling / Coordination of care, Documenting in the medical record, and Reviewing/placing orders in the medical record " (including tests, medications, and/or procedures).

## 2025-05-02 NOTE — PROGRESS NOTES
E-Consult  Thuy Allan 60 y.o. female MRN: [id number]  Encounter Date: 05/02/25      Reason for Consult / Principal Problem: Hypercalcemia    Consulting Provider: IWONA Sampson    Requesting Provider: Danae Yañez MD       ASSESSMENT:  Thuy Allan is a 60-year-old female with mild hypercalcemia.      RECOMMENDATIONS:  Assess patient's hydration  Recommend obtain serum and urine immunofixation from [facility]  If monoclonal protein noted, recommend ambulatory referral to hematology  If no monoclonal protein, recommend referral to nephrology    Total time spent 5-10 minutes, >50% of the total time devoted to medical consultative verbal/EMR discussion between providers. Written report will be generated in the EMR. .

## 2025-05-02 NOTE — ASSESSMENT & PLAN NOTE
Hypercalcemia noted again on BMP; ionized calcium normal. Discussed PTH is on upper limit or normal and  24 hour calcium urine boarderline. PTH rp wnl. Discussed nephrology referral again - pt declined    Pt agreed to complete NM parathyroid scan to definitely evaluate hyperparathyroidism for her chronic hypercalcemia. PTH is inappropriately normal   Orders:    DESHAWN E-CONSULT TO HEMATOLOGY    NM parathyroid scan w spect; Future

## 2025-05-19 ENCOUNTER — HOSPITAL ENCOUNTER (OUTPATIENT)
Dept: RADIOLOGY | Facility: HOSPITAL | Age: 60
Discharge: HOME | End: 2025-05-19
Attending: STUDENT IN AN ORGANIZED HEALTH CARE EDUCATION/TRAINING PROGRAM
Payer: COMMERCIAL

## 2025-05-19 ENCOUNTER — RESULTS FOLLOW-UP (OUTPATIENT)
Dept: FAMILY MEDICINE CLINIC | Facility: CLINIC | Age: 60
End: 2025-05-19

## 2025-05-19 DIAGNOSIS — E83.52 HYPERCALCEMIA: ICD-10-CM

## 2025-05-19 LAB
ALBUMIN 24H MFR UR ELPH: 100 %
ALPHA1 GLOB 24H MFR UR ELPH: 0 %
ALPHA2 GLOB 24H MFR UR ELPH: 0 %
B-GLOBULIN 24H MFR UR ELPH: 0 %
BUN SERPL-MCNC: 11 MG/DL (ref 6–24)
BUN/CREAT SERPL: 15 (ref 9–23)
CALCIUM SERPL-MCNC: 10.6 MG/DL (ref 8.7–10.2)
CHLORIDE SERPL-SCNC: 105 MMOL/L (ref 96–106)
CO2 SERPL-SCNC: 23 MMOL/L (ref 20–29)
CREAT SERPL-MCNC: 0.72 MG/DL (ref 0.57–1)
EGFR: 96 ML/MIN/1.73
GAMMA GLOB 24H MFR UR ELPH: 0 %
GLUCOSE SERPL-MCNC: 123 MG/DL (ref 70–99)
IMMUNOFIXATION ELECTROPHORESIS 1 URINE: NORMAL
M PROTEIN 24H MFR UR ELPH: NORMAL %
POTASSIUM SERPL-SCNC: 4.3 MMOL/L (ref 3.5–5.2)
PROT UR-MCNC: 20 MG/DL
SL AMB NOTE:: NORMAL
SODIUM SERPL-SCNC: 143 MMOL/L (ref 134–144)

## 2025-05-19 PROCEDURE — 78072 PARATHYRD PLANAR W/SPECT&CT: CPT

## 2025-05-19 PROCEDURE — A9500 TC99M SESTAMIBI: HCPCS

## 2025-05-19 NOTE — RESULT ENCOUNTER NOTE
Discussed results with pt; The immunofixation pattern appears unremarkable. Evidence of   monoclonal protein is not apparent on SPEP.     Pt completed Parathyroid scan today; waiting on results     Kidney function stable- continue with current dose of losartan

## 2025-05-21 ENCOUNTER — RESULTS FOLLOW-UP (OUTPATIENT)
Dept: FAMILY MEDICINE CLINIC | Facility: CLINIC | Age: 60
End: 2025-05-21

## 2025-05-21 DIAGNOSIS — E83.52 HYPERCALCEMIA: Primary | ICD-10-CM

## 2025-05-21 NOTE — RESULT ENCOUNTER NOTE
Please notify pt that her parathyroid scan was normal - at this time, I would recommend that we engage with Nephrology to figure out why your calcium is persistently high.

## 2025-07-10 ENCOUNTER — TELEPHONE (OUTPATIENT)
Age: 60
End: 2025-07-10

## 2025-07-10 NOTE — TELEPHONE ENCOUNTER
Patient is requesting an insurance referral for the following specialty:      Test Name / Order Name: Knee injections    DX Code: Did not have    Date Of Service: 7/25/25    Location/Facility Name/Address/Phone #:    [facility] [place], [facility] [address], [address], [address] [phone].    Location / Facility NPI:   Did not have  Best Phone # To Reach The Patient: [phone] .

## 2025-07-10 NOTE — TELEPHONE ENCOUNTER
Patient called stating that she has new insurance and would like her prescriptions transferred over to new pharmacy [facility].  Please advise.

## 2025-07-16 NOTE — TELEPHONE ENCOUNTER
Pt called in to follow up on referral pt stated she is no longer with [facility] and needs the script sent to [facility] ins info updated

## 2025-07-25 ENCOUNTER — OFFICE VISIT (OUTPATIENT)
Dept: OBGYN CLINIC | Facility: CLINIC | Age: 60
End: 2025-07-25
Payer: COMMERCIAL

## 2025-07-25 VITALS — HEIGHT: 64 IN | WEIGHT: 264 LBS | BODY MASS INDEX: 45.07 KG/M2

## 2025-07-25 DIAGNOSIS — M17.0 PRIMARY OSTEOARTHRITIS OF BOTH KNEES: Primary | ICD-10-CM

## 2025-07-25 PROCEDURE — 99213 OFFICE O/P EST LOW 20 MIN: CPT | Performed by: STUDENT IN AN ORGANIZED HEALTH CARE EDUCATION/TRAINING PROGRAM

## 2025-07-25 PROCEDURE — 20610 DRAIN/INJ JOINT/BURSA W/O US: CPT | Performed by: STUDENT IN AN ORGANIZED HEALTH CARE EDUCATION/TRAINING PROGRAM

## 2025-07-25 RX ORDER — LIDOCAINE HYDROCHLORIDE 10 MG/ML
4 INJECTION, SOLUTION EPIDURAL; INFILTRATION; INTRACAUDAL; PERINEURAL
Status: COMPLETED | OUTPATIENT
Start: 2025-07-25 | End: 2025-07-25

## 2025-07-25 RX ORDER — TRIAMCINOLONE ACETONIDE 40 MG/ML
40 INJECTION, SUSPENSION INTRA-ARTICULAR; INTRAMUSCULAR
Status: COMPLETED | OUTPATIENT
Start: 2025-07-25 | End: 2025-07-25

## 2025-07-25 RX ADMIN — LIDOCAINE HYDROCHLORIDE 4 ML: 10 INJECTION, SOLUTION EPIDURAL; INFILTRATION; INTRACAUDAL; PERINEURAL at 10:15

## 2025-07-25 RX ADMIN — TRIAMCINOLONE ACETONIDE 40 MG: 40 INJECTION, SUSPENSION INTRA-ARTICULAR; INTRAMUSCULAR at 10:15

## 2025-07-25 NOTE — ASSESSMENT & PLAN NOTE
Findings today are consistent with bilateral knee osteoarthritis.  Discussed treatment options including continued observation, low impact exercises, bracing, anti-inflammatories, physical therapy, cortisone injection, visco injection, versus surgical intervention. Cortisone steroid injection was administered today. Patient should avoid strenuous activities for the next 1-2 days. Patient should avoid vaccines for the next 2 weeks if possible. Can apply cold compress for soreness. If patient feels relief with the cortisone injections, procedure can be repeated every 3 months. Follow up in 3 months.   We also discussed she does have bakers cyst right knee and option is to have drained by Dr Acosta under US. She currently doesn't have regular insurance just states and everything is very expensive. She can call for referral for this option in future.       Orders:  •  Large joint arthrocentesis: bilateral knee  •  lidocaine (PF) (XYLOCAINE-MPF) 1 % injection 4 mL  •  lidocaine (PF) (XYLOCAINE-MPF) 1 % injection 4 mL  •  triamcinolone acetonide (Kenalog-40) 40 mg/mL injection 40 mg  •  triamcinolone acetonide (Kenalog-40) 40 mg/mL injection 40 mg

## 2025-07-25 NOTE — PROGRESS NOTES
Plan:  Assessment & Plan  Primary osteoarthritis of both knees  Findings today are consistent with bilateral knee osteoarthritis.  Discussed treatment options including continued observation, low impact exercises, bracing, anti-inflammatories, physical therapy, cortisone injection, visco injection, versus surgical intervention. Cortisone steroid injection was administered today. Patient should avoid strenuous activities for the next 1-2 days. Patient should avoid vaccines for the next 2 weeks if possible. Can apply cold compress for soreness. If patient feels relief with the cortisone injections, procedure can be repeated every 3 months. Follow up in 3 months.   We also discussed she does have bakers cyst right knee and option is to have drained by Dr Acosta under [place]. She currently doesn't have regular insurance just states and everything is very expensive. She can call for referral for this option in future.       Orders:  •  Large joint arthrocentesis: bilateral knee  •  lidocaine (PF) (XYLOCAINE-MPF) 1 % injection 4 mL  •  lidocaine (PF) (XYLOCAINE-MPF) 1 % injection 4 mL  •  triamcinolone acetonide (Kenalog-40) 40 mg/mL injection 40 mg  •  triamcinolone acetonide (Kenalog-40) 40 mg/mL injection 40 mg    Subjective:     Patient ID: Thuy Allan is a 60 y.o. female.  Chief Complaint:  HPI:  60 y.o. female presents to the office for follow up of bilateral knee osteoarthritis. At last visit she received bilateral knee cortisone injections that have been beneficial for her. She notes that she had a little bit less relief with the most recent injections compared to previous injections. Pain is global in nature mild ache to more severe depending on activity. Her pain worsens with activities. She is interested in discussing repeat cortisone injections today in the office. Uses motrin as needed.     Allergy:  Allergies   Allergen Reactions   • Molds & Smuts Other (See Comments)     congestion      Medications:  all current active meds have been reviewed  Past Medical History:  Past Medical History:   Diagnosis Date   • Arthritis    • Basal cell carcinoma     Per patient , had a BCC on her left upper arm 6 years ago that was removed by the MOHS procedure    • Disease of thyroid gland    • Herpes zoster     Right, at the T6 dermatome. last assessed: 13   • Hypercholesteremia    • Iron deficiency anemia 2017   • Obesity      Past Surgical History:  Past Surgical History:   Procedure Laterality Date   • BREAST BIOPSY Left 2019    US guided   • BREAST BIOPSY Left 2023   •  SECTION     • CHOLECYSTECTOMY     • MOHS SURGERY      Per patient, had the MOHS procedure for a BCC left upper arm 6 years ago   • US GUIDED BREAST BIOPSY LEFT COMPLETE Left 2019   • US GUIDED BREAST BIOPSY LEFT COMPLETE Left 2023     Family History:  Family History   Problem Relation Name Age of Onset   • Dementia Mother Anitra Allan    • Diabetes Father Dad    • Diabetes Sister Ellen    • Thyroid disease Sister Ellen         Disorder   • No Known Problems Sister     • No Known Problems Sister     • No Known Problems Daughter     • No Known Problems Maternal Grandmother     • No Known Problems Maternal Grandfather     • No Known Problems Paternal Grandmother     • No Known Problems Paternal Grandfather     • Autoimmune disease Brother Norris         Disorder   • Diabetes Brother Norris    • Thyroid disease Brother Norris    • No Known Problems Brother     • Breast cancer Maternal Aunt C         unknown age of onset   • No Known Problems Maternal Aunt     • No Known Problems Maternal Aunt     • No Known Problems Maternal Aunt     • No Known Problems Paternal Aunt       Social History:  Social History     Substance and Sexual Activity   Alcohol Use No     Social History     Substance and Sexual Activity   Drug Use No     Social History     Tobacco Use   Smoking Status Never   • Passive exposure: Past  "  Smokeless Tobacco Never         ROS:  General: Per HPI  Skin: Negative, except if noted below  HEENT: Negative  Respiratory: Negative  Cardiovascular: Negative  Gastrointestinal: Negative  Urinary: Negative  Vascular: Negative  Musculoskeletal: Positive per HPI   Neurologic: Positive per HPI  Endocrine: Negative    Objective:  BP Readings from Last 1 Encounters:   05/02/25 150/94      Wt Readings from Last 1 Encounters:   07/25/25 120 kg (264 lb)        Respiratory:   non-labored respirations    Lymphatics:  no palpable lymph nodes    Gait:   Antalgic    Neurologic:   Alert and oriented times 3  Patient with normal sensation except as noted below  Deep tendon reflexes 2+ except as noted in MSK exam    Bilateral Lower Extremity:  Left Knee:      Inspection:  skin intact    Overall limb alignment varus    Effusion: none    ROM full with pain    Extensor Lag: none    Palpation: medial Joint line tenderness to palpation    AP Stability at 90 deg stable    M/L stability in full extension stable    M/L stability in midflexion stable    Motor: 5/5 Q/HS/TA/GS/P    Pulses: 2+ DP / 2+ PT    SILT DP/SP/S/S/TN     Right knee:     Inspection:  skin intact    Overall limb alignment varus    Effusion: mild with palpable bakers cyst    ROM full with pain    Extensor Lag: none    Palpation: medial Joint line tenderness to palpation    AP Stability at 90 deg stable    M/L stability in full extension stable    M/L stability in midflexion stable    Motor: 5/5 Q/HS/TA/GS/P    Pulses: 2+ DP / 2+ PT    SILT DP/SP/S/S/TN     Imaging:  No new imaging obtained today    BMI:   Estimated body mass index is 45.32 kg/m² as calculated from the following:    Height as of this encounter: 5' 4\" (1.626 m).    Weight as of this encounter: 120 kg (264 lb).  BSA:   Estimated body surface area is 2.2 meters squared as calculated from the following:    Height as of this encounter: 5' 4\" (1.626 m).    Weight as of this encounter: 120 kg (264 lb).       "   Large joint arthrocentesis: bilateral knee    Performed by: Errol Bundy DO  Authorized by: Errol Bundy DO    Universal Protocol:  Consent: Verbal consent obtained  Risks and benefits: risks, benefits and alternatives were discussed  Consent given by: patient  Patient understanding: patient states understanding of the procedure being performed  Site marked: the operative site was marked  Patient identity confirmed: verbally with patient  Supporting Documentation  Indications: pain     Is this a Visco injection? NoProcedure Details  Location: knee - bilateral knee  Preparation: Patient was prepped and draped in the usual sterile fashion  Needle size: 22 G  Ultrasound guidance: no  Approach: anterolateral    Medications (Right): 4 mL lidocaine (PF) 1 %; 40 mg triamcinolone acetonide 40 mg/mLMedications (Left): 4 mL lidocaine (PF) 1 %; 40 mg triamcinolone acetonide 40 mg/mL   Patient tolerance: patient tolerated the procedure well with no immediate complications  Dressing:  Sterile dressing applied          Scribe Attestation    I,:  Praneeth Ford am acting as a scribe while in the presence of the attending physician.:       I,:  Errol Bundy DO personally performed the services described in this documentation    as scribed in my presence.:

## 2025-08-11 ENCOUNTER — TELEPHONE (OUTPATIENT)
Dept: OBGYN CLINIC | Facility: CLINIC | Age: 60
End: 2025-08-11